# Patient Record
Sex: MALE | Race: ASIAN | NOT HISPANIC OR LATINO
[De-identification: names, ages, dates, MRNs, and addresses within clinical notes are randomized per-mention and may not be internally consistent; named-entity substitution may affect disease eponyms.]

---

## 2017-02-23 ENCOUNTER — APPOINTMENT (OUTPATIENT)
Dept: OPHTHALMOLOGY | Facility: CLINIC | Age: 72
End: 2017-02-23

## 2017-02-23 DIAGNOSIS — H26.9 UNSPECIFIED CATARACT: ICD-10-CM

## 2017-03-24 ENCOUNTER — APPOINTMENT (OUTPATIENT)
Dept: OPHTHALMOLOGY | Facility: CLINIC | Age: 72
End: 2017-03-24

## 2017-07-14 ENCOUNTER — APPOINTMENT (OUTPATIENT)
Dept: OPHTHALMOLOGY | Facility: CLINIC | Age: 72
End: 2017-07-14

## 2017-12-15 ENCOUNTER — APPOINTMENT (OUTPATIENT)
Dept: OPHTHALMOLOGY | Facility: CLINIC | Age: 72
End: 2017-12-15
Payer: MEDICARE

## 2017-12-15 PROCEDURE — 92012 INTRM OPH EXAM EST PATIENT: CPT

## 2017-12-15 PROCEDURE — 92083 EXTENDED VISUAL FIELD XM: CPT

## 2018-02-09 ENCOUNTER — APPOINTMENT (OUTPATIENT)
Dept: OPHTHALMOLOGY | Facility: CLINIC | Age: 73
End: 2018-02-09
Payer: MEDICARE

## 2018-02-09 PROCEDURE — 92012 INTRM OPH EXAM EST PATIENT: CPT | Mod: 25

## 2018-02-09 PROCEDURE — 68761 CLOSE TEAR DUCT OPENING: CPT | Mod: E2,E4

## 2018-02-14 ENCOUNTER — APPOINTMENT (OUTPATIENT)
Dept: THORACIC SURGERY | Facility: CLINIC | Age: 73
End: 2018-02-14
Payer: MEDICARE

## 2018-02-14 VITALS
BODY MASS INDEX: 23.66 KG/M2 | HEIGHT: 65 IN | SYSTOLIC BLOOD PRESSURE: 123 MMHG | DIASTOLIC BLOOD PRESSURE: 80 MMHG | WEIGHT: 142 LBS | RESPIRATION RATE: 17 BRPM | OXYGEN SATURATION: 97 % | HEART RATE: 88 BPM

## 2018-02-14 DIAGNOSIS — Z82.49 FAMILY HISTORY OF ISCHEMIC HEART DISEASE AND OTHER DISEASES OF THE CIRCULATORY SYSTEM: ICD-10-CM

## 2018-02-14 DIAGNOSIS — Z86.69 PERSONAL HISTORY OF OTHER DISEASES OF THE NERVOUS SYSTEM AND SENSE ORGANS: ICD-10-CM

## 2018-02-14 PROCEDURE — 99205 OFFICE O/P NEW HI 60 MIN: CPT

## 2018-02-14 RX ORDER — ALENDRONATE SODIUM 70 MG/1
70 TABLET ORAL
Refills: 0 | Status: ACTIVE | COMMUNITY

## 2018-02-14 RX ORDER — ATORVASTATIN CALCIUM 10 MG/1
10 TABLET, FILM COATED ORAL
Refills: 0 | Status: ACTIVE | COMMUNITY

## 2018-02-23 ENCOUNTER — OUTPATIENT (OUTPATIENT)
Dept: OUTPATIENT SERVICES | Facility: HOSPITAL | Age: 73
LOS: 1 days | End: 2018-02-23
Payer: MEDICARE

## 2018-02-23 VITALS
SYSTOLIC BLOOD PRESSURE: 130 MMHG | HEART RATE: 92 BPM | WEIGHT: 141.98 LBS | DIASTOLIC BLOOD PRESSURE: 80 MMHG | HEIGHT: 65 IN | TEMPERATURE: 98 F | OXYGEN SATURATION: 96 % | RESPIRATION RATE: 17 BRPM

## 2018-02-23 DIAGNOSIS — G47.33 OBSTRUCTIVE SLEEP APNEA (ADULT) (PEDIATRIC): ICD-10-CM

## 2018-02-23 DIAGNOSIS — Z98.49 CATARACT EXTRACTION STATUS, UNSPECIFIED EYE: Chronic | ICD-10-CM

## 2018-02-23 DIAGNOSIS — Z98.890 OTHER SPECIFIED POSTPROCEDURAL STATES: Chronic | ICD-10-CM

## 2018-02-23 DIAGNOSIS — J45.909 UNSPECIFIED ASTHMA, UNCOMPLICATED: ICD-10-CM

## 2018-02-23 DIAGNOSIS — R91.8 OTHER NONSPECIFIC ABNORMAL FINDING OF LUNG FIELD: ICD-10-CM

## 2018-02-23 DIAGNOSIS — Z91.013 ALLERGY TO SEAFOOD: ICD-10-CM

## 2018-02-23 LAB
ALBUMIN SERPL ELPH-MCNC: 4.3 G/DL — SIGNIFICANT CHANGE UP (ref 3.3–5)
ALP SERPL-CCNC: 50 U/L — SIGNIFICANT CHANGE UP (ref 40–120)
ALT FLD-CCNC: 48 U/L — HIGH (ref 4–41)
AST SERPL-CCNC: 32 U/L — SIGNIFICANT CHANGE UP (ref 4–40)
BILIRUB SERPL-MCNC: 0.7 MG/DL — SIGNIFICANT CHANGE UP (ref 0.2–1.2)
BLD GP AB SCN SERPL QL: NEGATIVE — SIGNIFICANT CHANGE UP
BUN SERPL-MCNC: 12 MG/DL — SIGNIFICANT CHANGE UP (ref 7–23)
CALCIUM SERPL-MCNC: 8.6 MG/DL — SIGNIFICANT CHANGE UP (ref 8.4–10.5)
CHLORIDE SERPL-SCNC: 106 MMOL/L — SIGNIFICANT CHANGE UP (ref 98–107)
CO2 SERPL-SCNC: 27 MMOL/L — SIGNIFICANT CHANGE UP (ref 22–31)
CREAT SERPL-MCNC: 0.83 MG/DL — SIGNIFICANT CHANGE UP (ref 0.5–1.3)
GLUCOSE SERPL-MCNC: 102 MG/DL — HIGH (ref 70–99)
HCT VFR BLD CALC: 44.5 % — SIGNIFICANT CHANGE UP (ref 39–50)
HGB BLD-MCNC: 14.3 G/DL — SIGNIFICANT CHANGE UP (ref 13–17)
MCHC RBC-ENTMCNC: 30 PG — SIGNIFICANT CHANGE UP (ref 27–34)
MCHC RBC-ENTMCNC: 32.1 % — SIGNIFICANT CHANGE UP (ref 32–36)
MCV RBC AUTO: 93.5 FL — SIGNIFICANT CHANGE UP (ref 80–100)
NRBC # FLD: 0 — SIGNIFICANT CHANGE UP
PLATELET # BLD AUTO: 200 K/UL — SIGNIFICANT CHANGE UP (ref 150–400)
PMV BLD: 11.1 FL — SIGNIFICANT CHANGE UP (ref 7–13)
POTASSIUM SERPL-MCNC: 3.9 MMOL/L — SIGNIFICANT CHANGE UP (ref 3.5–5.3)
POTASSIUM SERPL-SCNC: 3.9 MMOL/L — SIGNIFICANT CHANGE UP (ref 3.5–5.3)
PROT SERPL-MCNC: 7.9 G/DL — SIGNIFICANT CHANGE UP (ref 6–8.3)
RBC # BLD: 4.76 M/UL — SIGNIFICANT CHANGE UP (ref 4.2–5.8)
RBC # FLD: 13.4 % — SIGNIFICANT CHANGE UP (ref 10.3–14.5)
RH IG SCN BLD-IMP: POSITIVE — SIGNIFICANT CHANGE UP
SODIUM SERPL-SCNC: 144 MMOL/L — SIGNIFICANT CHANGE UP (ref 135–145)
WBC # BLD: 5.31 K/UL — SIGNIFICANT CHANGE UP (ref 3.8–10.5)
WBC # FLD AUTO: 5.31 K/UL — SIGNIFICANT CHANGE UP (ref 3.8–10.5)

## 2018-02-23 PROCEDURE — 93010 ELECTROCARDIOGRAM REPORT: CPT

## 2018-02-23 RX ORDER — SODIUM CHLORIDE 9 MG/ML
1000 INJECTION, SOLUTION INTRAVENOUS
Qty: 0 | Refills: 0 | Status: DISCONTINUED | OUTPATIENT
Start: 2018-02-28 | End: 2018-03-04

## 2018-02-23 NOTE — H&P PST ADULT - RS GEN PE MLT RESP DETAILS PC
good air movement/no rales/no chest wall tenderness/no wheezes/airway patent/no rhonchi/respirations non-labored/clear to auscultation bilaterally/breath sounds equal

## 2018-02-23 NOTE — H&P PST ADULT - HISTORY OF PRESENT ILLNESS
72 year old male presents to presurgical testing with diagnosis of other specific abnormal finding of lung field scheduled for navigational bronchoscopy biopsy right video assisted thoracoscopy, robotic assisted right lower lobectomy for 2/28/18. Pt reports fever and cough in 2/2018 treated with antibiotics, with Abnormal CXR. Pt followed up with CT chest which revealed a right lower lung mass. Pt denies SOB with exertion. Reports cough has resolved.

## 2018-02-23 NOTE — H&P PST ADULT - NEGATIVE OPHTHALMOLOGIC SYMPTOMS
no blurred vision L/no blurred vision R/no pain L/no loss of vision R/no pain R/no loss of vision L/no diplopia/no photophobia

## 2018-02-23 NOTE — H&P PST ADULT - PMH
Asthma  mild  BPH (Benign Prostatic Hyperplasia)    Elevated triglycerides with high cholesterol    Fatty liver    Foot fracture, left  " 90's  Impaired glucose tolerance  on diet control  DALIA on CPAP    Other nonspecific abnormal finding of lung field    Renal calculi  ' 2005:  passed  TB (tuberculosis)  age 20:  Treated with meds X 1year

## 2018-02-23 NOTE — H&P PST ADULT - PSH
Foot fracture, left  ' 90's:  ORIF  H/O left inguinal hernia repair    History of prostate surgery  greenlight laser 2013  Status post cataract extraction  bilateral

## 2018-02-23 NOTE — H&P PST ADULT - NEGATIVE NEUROLOGICAL SYMPTOMS
no difficulty walking/no tremors/no syncope/no vertigo/no headache/no transient paralysis/no weakness/no generalized seizures/no focal seizures/no paresthesias

## 2018-02-23 NOTE — H&P PST ADULT - NEGATIVE ENMT SYMPTOMS
no dysphagia/no vertigo/no nose bleeds/no tinnitus/no ear pain/no hearing difficulty/no throat pain/no sinus symptoms

## 2018-02-23 NOTE — H&P PST ADULT - PROBLEM SELECTOR PLAN 1
Pt is scheduled for navigational bronchoscopy biopsy right video assisted thoracoscopy, robotic assisted right lower lobectomy for 2/28/18. Preop instructions, pepcid, surgical scrub provided. Pt stated understanding. Pending medical evaluation with cardiac note with recent echo, stress, and halter monitor results.

## 2018-02-27 ENCOUNTER — APPOINTMENT (OUTPATIENT)
Dept: THORACIC SURGERY | Facility: CLINIC | Age: 73
End: 2018-02-27
Payer: MEDICARE

## 2018-02-27 VITALS
WEIGHT: 146 LBS | HEART RATE: 89 BPM | DIASTOLIC BLOOD PRESSURE: 82 MMHG | RESPIRATION RATE: 17 BRPM | SYSTOLIC BLOOD PRESSURE: 133 MMHG | BODY MASS INDEX: 24.3 KG/M2 | OXYGEN SATURATION: 95 % | TEMPERATURE: 97.4 F

## 2018-02-27 PROCEDURE — 99214 OFFICE O/P EST MOD 30 MIN: CPT | Mod: 24

## 2018-02-28 ENCOUNTER — RESULT REVIEW (OUTPATIENT)
Age: 73
End: 2018-02-28

## 2018-02-28 ENCOUNTER — APPOINTMENT (OUTPATIENT)
Dept: THORACIC SURGERY | Facility: HOSPITAL | Age: 73
End: 2018-02-28

## 2018-02-28 ENCOUNTER — TRANSCRIPTION ENCOUNTER (OUTPATIENT)
Age: 73
End: 2018-02-28

## 2018-02-28 ENCOUNTER — INPATIENT (INPATIENT)
Facility: HOSPITAL | Age: 73
LOS: 11 days | Discharge: HOME | End: 2018-03-12
Attending: THORACIC SURGERY (CARDIOTHORACIC VASCULAR SURGERY) | Admitting: THORACIC SURGERY (CARDIOTHORACIC VASCULAR SURGERY)
Payer: MEDICARE

## 2018-02-28 VITALS
DIASTOLIC BLOOD PRESSURE: 86 MMHG | HEART RATE: 74 BPM | RESPIRATION RATE: 16 BRPM | OXYGEN SATURATION: 97 % | WEIGHT: 141.98 LBS | HEIGHT: 65 IN | SYSTOLIC BLOOD PRESSURE: 135 MMHG | TEMPERATURE: 98 F

## 2018-02-28 DIAGNOSIS — R91.8 OTHER NONSPECIFIC ABNORMAL FINDING OF LUNG FIELD: ICD-10-CM

## 2018-02-28 DIAGNOSIS — Z98.890 OTHER SPECIFIED POSTPROCEDURAL STATES: Chronic | ICD-10-CM

## 2018-02-28 DIAGNOSIS — Z98.49 CATARACT EXTRACTION STATUS, UNSPECIFIED EYE: Chronic | ICD-10-CM

## 2018-02-28 LAB
BASE EXCESS BLDA CALC-SCNC: -0.9 MMOL/L — SIGNIFICANT CHANGE UP
BASE EXCESS BLDA CALC-SCNC: -1.7 MMOL/L — SIGNIFICANT CHANGE UP
BUN SERPL-MCNC: 13 MG/DL — SIGNIFICANT CHANGE UP (ref 7–23)
CA-I BLDA-SCNC: 1.12 MMOL/L — LOW (ref 1.15–1.29)
CALCIUM SERPL-MCNC: 7.9 MG/DL — LOW (ref 8.4–10.5)
CHLORIDE SERPL-SCNC: 102 MMOL/L — SIGNIFICANT CHANGE UP (ref 98–107)
CO2 SERPL-SCNC: 20 MMOL/L — LOW (ref 22–31)
CREAT SERPL-MCNC: 0.75 MG/DL — SIGNIFICANT CHANGE UP (ref 0.5–1.3)
GLUCOSE BLDA-MCNC: 177 MG/DL — HIGH (ref 70–99)
GLUCOSE SERPL-MCNC: 172 MG/DL — HIGH (ref 70–99)
GRAM STN SPT: SIGNIFICANT CHANGE UP
HCO3 BLDA-SCNC: 23 MMOL/L — SIGNIFICANT CHANGE UP (ref 22–26)
HCO3 BLDA-SCNC: 23 MMOL/L — SIGNIFICANT CHANGE UP (ref 22–26)
HCT VFR BLDA CALC: 43.8 % — SIGNIFICANT CHANGE UP (ref 39–51)
HGB BLDA-MCNC: 14.3 G/DL — SIGNIFICANT CHANGE UP (ref 13–17)
MAGNESIUM SERPL-MCNC: 2 MG/DL — SIGNIFICANT CHANGE UP (ref 1.6–2.6)
PCO2 BLDA: 43 MMHG — SIGNIFICANT CHANGE UP (ref 35–48)
PCO2 BLDA: 51 MMHG — HIGH (ref 35–48)
PH BLDA: 7.3 PH — LOW (ref 7.35–7.45)
PH BLDA: 7.35 PH — SIGNIFICANT CHANGE UP (ref 7.35–7.45)
PHOSPHATE SERPL-MCNC: 2.4 MG/DL — LOW (ref 2.5–4.5)
PO2 BLDA: 128 MMHG — HIGH (ref 83–108)
PO2 BLDA: 61 MMHG — LOW (ref 83–108)
POTASSIUM BLDA-SCNC: 4 MMOL/L — SIGNIFICANT CHANGE UP (ref 3.4–4.5)
POTASSIUM SERPL-MCNC: 3.8 MMOL/L — SIGNIFICANT CHANGE UP (ref 3.5–5.3)
POTASSIUM SERPL-SCNC: 3.8 MMOL/L — SIGNIFICANT CHANGE UP (ref 3.5–5.3)
RH IG SCN BLD-IMP: POSITIVE — SIGNIFICANT CHANGE UP
SAO2 % BLDA: 88.3 % — LOW (ref 95–99)
SAO2 % BLDA: 98.8 % — SIGNIFICANT CHANGE UP (ref 95–99)
SODIUM BLDA-SCNC: 142 MMOL/L — SIGNIFICANT CHANGE UP (ref 136–146)
SODIUM SERPL-SCNC: 139 MMOL/L — SIGNIFICANT CHANGE UP (ref 135–145)
SPECIMEN SOURCE: SIGNIFICANT CHANGE UP

## 2018-02-28 PROCEDURE — 31624 DX BRONCHOSCOPE/LAVAGE: CPT

## 2018-02-28 PROCEDURE — 88331 PATH CONSLTJ SURG 1 BLK 1SPC: CPT | Mod: 26

## 2018-02-28 PROCEDURE — 71045 X-RAY EXAM CHEST 1 VIEW: CPT | Mod: 26

## 2018-02-28 PROCEDURE — 31645 BRNCHSC W/THER ASPIR 1ST: CPT | Mod: 76

## 2018-02-28 PROCEDURE — 31625 BRONCHOSCOPY W/BIOPSY(S): CPT | Mod: 59

## 2018-02-28 PROCEDURE — 88305 TISSUE EXAM BY PATHOLOGIST: CPT | Mod: 26

## 2018-02-28 PROCEDURE — 31623 DX BRONCHOSCOPE/BRUSH: CPT

## 2018-02-28 PROCEDURE — 32674 THORACOSCOPY LYMPH NODE EXC: CPT

## 2018-02-28 PROCEDURE — 88305 TISSUE EXAM BY PATHOLOGIST: CPT | Mod: 26,59

## 2018-02-28 PROCEDURE — 32663 THORACOSCOPY W/LOBECTOMY: CPT

## 2018-02-28 PROCEDURE — 88307 TISSUE EXAM BY PATHOLOGIST: CPT | Mod: 26

## 2018-02-28 PROCEDURE — 32652 THORACOSCOPY REM TOTL CORTEX: CPT

## 2018-02-28 PROCEDURE — 31627 NAVIGATIONAL BRONCHOSCOPY: CPT

## 2018-02-28 PROCEDURE — 32668 THORACOSCOPY W/W RESECT DIAG: CPT

## 2018-02-28 PROCEDURE — 88313 SPECIAL STAINS GROUP 2: CPT | Mod: 26

## 2018-02-28 PROCEDURE — 88173 CYTOPATH EVAL FNA REPORT: CPT | Mod: 26

## 2018-02-28 PROCEDURE — 88309 TISSUE EXAM BY PATHOLOGIST: CPT | Mod: 26

## 2018-02-28 PROCEDURE — 88112 CYTOPATH CELL ENHANCE TECH: CPT | Mod: 26,59

## 2018-02-28 PROCEDURE — 99222 1ST HOSP IP/OBS MODERATE 55: CPT

## 2018-02-28 PROCEDURE — 31629 BRONCHOSCOPY/NEEDLE BX EACH: CPT

## 2018-02-28 PROCEDURE — S2900 ROBOTIC SURGICAL SYSTEM: CPT | Mod: NC

## 2018-02-28 RX ORDER — SODIUM CHLORIDE 9 MG/ML
250 INJECTION, SOLUTION INTRAVENOUS ONCE
Qty: 0 | Refills: 0 | Status: COMPLETED | OUTPATIENT
Start: 2018-02-28 | End: 2018-02-28

## 2018-02-28 RX ORDER — FAMOTIDINE 10 MG/ML
20 INJECTION INTRAVENOUS
Qty: 0 | Refills: 0 | Status: DISCONTINUED | OUTPATIENT
Start: 2018-02-28 | End: 2018-03-03

## 2018-02-28 RX ORDER — HEPARIN SODIUM 5000 [USP'U]/ML
5000 INJECTION INTRAVENOUS; SUBCUTANEOUS EVERY 8 HOURS
Qty: 0 | Refills: 0 | Status: DISCONTINUED | OUTPATIENT
Start: 2018-02-28 | End: 2018-03-12

## 2018-02-28 RX ORDER — FENTANYL CITRATE 50 UG/ML
25 INJECTION INTRAVENOUS ONCE
Qty: 0 | Refills: 0 | Status: DISCONTINUED | OUTPATIENT
Start: 2018-02-28 | End: 2018-02-28

## 2018-02-28 RX ORDER — HYDROMORPHONE HYDROCHLORIDE 2 MG/ML
30 INJECTION INTRAMUSCULAR; INTRAVENOUS; SUBCUTANEOUS
Qty: 0 | Refills: 0 | Status: DISCONTINUED | OUTPATIENT
Start: 2018-02-28 | End: 2018-03-04

## 2018-02-28 RX ORDER — ONDANSETRON 8 MG/1
4 TABLET, FILM COATED ORAL EVERY 6 HOURS
Qty: 0 | Refills: 0 | Status: DISCONTINUED | OUTPATIENT
Start: 2018-02-28 | End: 2018-03-04

## 2018-02-28 RX ORDER — FLUTICASONE PROPIONATE 220 MCG
1 AEROSOL WITH ADAPTER (GRAM) INHALATION
Qty: 0 | Refills: 0 | Status: DISCONTINUED | OUTPATIENT
Start: 2018-02-28 | End: 2018-03-02

## 2018-02-28 RX ORDER — FENTANYL CITRATE 50 UG/ML
25 INJECTION INTRAVENOUS
Qty: 0 | Refills: 0 | Status: DISCONTINUED | OUTPATIENT
Start: 2018-02-28 | End: 2018-03-01

## 2018-02-28 RX ORDER — ACETAMINOPHEN 500 MG
1000 TABLET ORAL ONCE
Qty: 0 | Refills: 0 | Status: COMPLETED | OUTPATIENT
Start: 2018-02-28 | End: 2018-03-01

## 2018-02-28 RX ORDER — DOCUSATE SODIUM 100 MG
100 CAPSULE ORAL THREE TIMES A DAY
Qty: 0 | Refills: 0 | Status: DISCONTINUED | OUTPATIENT
Start: 2018-02-28 | End: 2018-03-12

## 2018-02-28 RX ORDER — IPRATROPIUM/ALBUTEROL SULFATE 18-103MCG
3 AEROSOL WITH ADAPTER (GRAM) INHALATION EVERY 6 HOURS
Qty: 0 | Refills: 0 | Status: DISCONTINUED | OUTPATIENT
Start: 2018-02-28 | End: 2018-03-01

## 2018-02-28 RX ORDER — HEPARIN SODIUM 5000 [USP'U]/ML
5000 INJECTION INTRAVENOUS; SUBCUTANEOUS ONCE
Qty: 0 | Refills: 0 | Status: COMPLETED | OUTPATIENT
Start: 2018-02-28 | End: 2018-02-28

## 2018-02-28 RX ORDER — ATORVASTATIN CALCIUM 80 MG/1
10 TABLET, FILM COATED ORAL AT BEDTIME
Qty: 0 | Refills: 0 | Status: DISCONTINUED | OUTPATIENT
Start: 2018-02-28 | End: 2018-03-12

## 2018-02-28 RX ORDER — HYDROMORPHONE HYDROCHLORIDE 2 MG/ML
0.5 INJECTION INTRAMUSCULAR; INTRAVENOUS; SUBCUTANEOUS
Qty: 0 | Refills: 0 | Status: DISCONTINUED | OUTPATIENT
Start: 2018-02-28 | End: 2018-03-04

## 2018-02-28 RX ORDER — NALOXONE HYDROCHLORIDE 4 MG/.1ML
0.1 SPRAY NASAL
Qty: 0 | Refills: 0 | Status: DISCONTINUED | OUTPATIENT
Start: 2018-02-28 | End: 2018-03-04

## 2018-02-28 RX ADMIN — HEPARIN SODIUM 5000 UNIT(S): 5000 INJECTION INTRAVENOUS; SUBCUTANEOUS at 11:34

## 2018-02-28 RX ADMIN — FENTANYL CITRATE 25 MICROGRAM(S): 50 INJECTION INTRAVENOUS at 22:55

## 2018-02-28 RX ADMIN — FENTANYL CITRATE 25 MICROGRAM(S): 50 INJECTION INTRAVENOUS at 23:10

## 2018-02-28 RX ADMIN — SODIUM CHLORIDE 30 MILLILITER(S): 9 INJECTION, SOLUTION INTRAVENOUS at 11:35

## 2018-02-28 RX ADMIN — SODIUM CHLORIDE 500 MILLILITER(S): 9 INJECTION, SOLUTION INTRAVENOUS at 12:43

## 2018-02-28 NOTE — ASU PREOP CHECKLIST - INTERNAL PROSTHESES
Right hernia mesh and Left foot screw/yes(specify) left hernia mesh and right foot screw/yes(specify)

## 2018-02-28 NOTE — BRIEF OPERATIVE NOTE - PROCEDURE
<<-----Click on this checkbox to enter Procedure Lobectomy, lung, robot assisted  02/28/2018  Robot assisted RLL wedge resection and completion lobectomy  Active  [id number]  Flexible bronchoscopy  02/28/2018    Active  [id number]  Navigational bronchoscopy  02/28/2018    Active  [id number]

## 2018-02-28 NOTE — BRIEF OPERATIVE NOTE - COMMENTS
Post extubation somnolent and groaning, using muscles of accessory respiration. Emergent bronchoscopy performed by Dr. Maier with some blood and secretions.  Patient more alert afterwards.

## 2018-02-28 NOTE — BRIEF OPERATIVE NOTE - SPECIMENS
L sided bronchial alveolar lavage, R sided navigational bronchoscopy biospies, RLL wedge, Completion right lobectomy

## 2018-02-28 NOTE — BRIEF OPERATIVE NOTE - OPERATION/FINDINGS
Navigational bronchoscopy biopsies with inflammatory bronchial cells on cytopathology  RLL lung nodule wedged out consistent with adenocarcinoma requiring completion lobectomy

## 2018-02-28 NOTE — PROGRESS NOTE ADULT - SUBJECTIVE AND OBJECTIVE BOX
72 M PMH mild Asthma  mild, BPH, triglyceridemia, fatty liver, DALIA on CPAP, renal calculi, TB w SPN  The patient had a fever & cough w an abnormal CXR in Feb 2018, treated w ABXs. Follow up CT scan revealed a right lower lung mass. Currently, the pt has no SOB and his cough has resolved.     POD # 0 ([id number]): Navigational bronchoscopy, VATS, RLLectomy, MLND, FOB    Issues:   Post op pain  	DALIA  	HLD  	BPH  	Asthma  	BPH    ROS:  As per medical records  General 	no fever; no chills; no sweating; no anorexia; no weight loss; no weight gain	  Skin 	no rash; no itching	  Breast 	no breast tenderness L; no breast tenderness R; no breast lump L; no breast lump R 	  Ophthalmologic	no diplopia; no photophobia; no blurred vision L; no blurred vision R; no pain L; no pain R; no loss of vision L; no loss of vision R  ENMT	no hearing difficulty; no ear pain; no tinnitus; no vertigo; no sinus symptoms; no nose bleeds; no throat pain; no dysphagia  	  Respiratory and Thorax	no wheezing; no dyspnea; no cough, asthma - mild, denies recent exacerbations, never intubated, Dx pulmonary nodule  Cardiovascular				no chest pain; no palpitations; no peripheral edema  Gastrointestinal	no nausea; no vomiting; no diarrhea; no constipation; no abdominal pain; no melena  Genitourinary	no nausea; no vomiting; no diarrhea; no constipation; no abdominal pain; no melena, increased urinary frequency; nocturia; BPH  Musculoskeletal	no arthralgia; no arthritis; no stiffness; no neck pain; no back pain  Neurological	no transient paralysis; no weakness; no paresthesias; no generalized seizures; no focal seizures; no syncope; no tremors; no vertigo; no difficulty walking; no headache  Psychiatric	no suicidal ideation; no depression; no anxiety  Hematology	no gum bleeding; no nose bleeding  Lymphatic	no enlarged lymph nodes; no tender lymph nodes  Endocrine	no cold intolerance; no heat intolerance, impaired glucose tolerance on diet control. Denies thyroid dysfunction  Immunological	no recurring infections; no persistent infections      Past Medical History:  Asthma  mild  BPH (Benign Prostatic Hyperplasia)    Elevated triglycerides with high cholesterol    Fatty liver    Foot fracture, left  " 90's  Impaired glucose tolerance  on diet control  DALIA on CPAP    Other nonspecific abnormal finding of lung field    Renal calculi  ' 2005:  passed  TB (tuberculosis)  age 20:  Treated with meds X 1year.     Past Surgical History:  Foot fracture, left  ' 90's:  ORIF  H/O left inguinal hernia repair    History of prostate surgery  greenlight laser 2013  Status post cataract extraction  bilateral.     Family History:  No pertinent family history in first degree relatives.     Social History:  Marital Status		  Occupation	retired	  Lives With	spouse	     Substance Use History:  Substance Use	never used	     Alcohol Use History:  Have you ever consumed alcohol	never	     Tobacco Usage:  Tobacco Usage: Never smoker	     Passive Smoke Exposure:  Passive Smoke Exposure	No	         Allergies:  	strawberry: Food, Confirmed, Patient, Other, Cough  	Cherries: Cough: Food, Confirmed, Patient, Other, Cherries: Cough  	shellfish: Food, Confirmed, Patient, Other, cough  	dust: Miscellaneous, Confirmed, Patient, Sneezing, Environmental Allergy    Home Medications:   * Incomplete Medication History as of 23-Feb-2018 13:42 documented in Structured Notes  · 	atorvastatin 10 mg oral tablet: Last Dose Taken:  , 1 tab(s) orally once a day, pm  · 	multivitamin: Last Dose Taken:  , 1 tab(s) orally once a day, last dose 2/23/18  · 	Vitamin D 3: Last Dose Taken:  , 1000 unit(s) orally once a day, last dose 2/23/18  · 	loratadine 10 mg oral tablet: Last Dose Taken:  , 1 tab(s) orally once a day, pm  · 	Omega 3 acid: Last Dose Taken:  , 1  orally 2 times a day, last dose 2/23/18  · 	Advair Diskus 250 mcg-50 mcg inhalation powder: Last Dose Taken:  , 1 puff(s) inhaled 2 times a day  · 	fluticasone 50 mcg inhalation powder: Last Dose Taken:  , 1 puff(s) inhaled 2 times a day       MEDICATIONS  (STANDING):  atorvastatin 10 milliGRAM(s) Oral at bedtime  docusate sodium 100 milliGRAM(s) Oral three times a day  famotidine    Tablet 20 milliGRAM(s) Oral two times a day  fluticasone propionate   44 MICROgram(s) HFA Inhaler 1 Puff(s) Inhalation two times a day  heparin  Injectable 5000 Unit(s) SubCutaneous every 8 hours  lactated ringers. 1000 milliLiter(s) (30 mL/Hr) IV Continuous <Continuous>    MEDICATIONS  (PRN):  acetaminophen  IVPB. 1000 milliGRAM(s) IV Intermittent once PRN Moderate Pain (4 - 6)  ALBUTerol/ipratropium for Nebulization 3 milliLiter(s) Nebulizer every 6 hours PRN Shortness of Breath  fentaNYL    Injectable 25 MICROGram(s) IV Push every 2 hours PRN Moderate Pain (4 - 6)      ICU Vital Signs Last 24 Hrs  T(C): 36.4 (28 Feb 2018 21:35), Max: 36.8 (28 Feb 2018 11:02)  T(F): 97.6 (28 Feb 2018 21:35), Max: 98.2 (28 Feb 2018 11:02)  HR: 87 (28 Feb 2018 21:45) (74 - 98)  BP: 123/74 (28 Feb 2018 21:40) (123/74 - 135/86)  BP(mean): 84 (28 Feb 2018 21:40) (84 - 94)  ABP: 120/87 (28 Feb 2018 21:45) (120/87 - 123/81)  ABP(mean): 103 (28 Feb 2018 21:45) (100 - 103)  RR: 11 (28 Feb 2018 21:45) (11 - 16)  SpO2: 98% (28 Feb 2018 21:45) (95% - 98%)      Physical exam:                           General			WDWN in NAD	  HEENT			NC, AT, PERRL; EOMI; conjunctiva clear, No oral lesions; gross abnormalities  Neck			Supple  Resp			Good B/L air ebntery, decreased at bases R>L, + rhonchi/ crackles  CVS:  			regular rate and rhythm  no murmur  EXT			no clubbing; no cyanosis; no pedal edema  Neuro:			Sedated, arousable, No focal deficits  MSK			ROM intact; no joint swelling/ erythema/ warmth; no calf tenderness; nl strength			    Labs:                                                            [id number]  WBC  		5.31  HH		14.3/44.5  PLT		200K    Na		144  K		3.9  Chloride	106  Bicarb		27  BUN		12  Creatinine	0.83  GFR		88  Total Protein	7.9  Albumin	4.3  Total Bilirubin	0.7  Alk Phos	50  AST		32  ALT		48    POCT Blood Glucose.: 95 mg/dL (28 Feb 2018 11:14)    ABG - ( 28 Feb 2018 20:57 )  pH: 7.30  /  pCO2: 51    /  pO2: 61    / HCO3: 23    / Base Excess: -0.9  /  SaO2: 88.3       CXR  [id number]  Volume loss on R  SQ emphysema  No PTX  NAD  CT in good place      Plan:  HPI:  72 year old male presents to presurgical testing with diagnosis of other specific abnormal finding of lung field scheduled for navigational bronchoscopy biopsy right video assisted thoracoscopy, robotic assisted right lower lobectomy for 2/28/18. Pt reports fever and cough in 2/2018 treated with antibiotics, with Abnormal CXR. Pt followed up with CT chest which revealed a right lower lung mass. Pt denies SOB with exertion. Reports cough has resolved. (23 Feb 2018 13:36)                            Neuro:                                         Pain control with PCA / Tylenol IV                             Cardiovascular:                                          Continue hemodynamic monitoring.                                         Not on any pressors                                         Continue cardiovascular / antihypertensive medications      Resume BBs asap                            Respiratory:                                         Pt is on nasal canula                                         Comfortable, not in any distress.                                         Use incentive spirometry asap     Monitor chest tube output                                         Chest tube to suction                                          Continue bronchodilators, pulmonary toilet                            GI                                         On clears liquid diet                                         Continue GI prophylaxis with Protonix                                         Continue Zofran / Reglan for nausea - PRN	                                  Renal:                                         Continue IVF                                         Monitor I/Os and electrolytes                                         Keep Bell                                                 Hematologic / Oncology:                                         SQH & SCDs for VTE prophylaxis                                         Monitor chest tube output. No signs of active bleeding.                                          Follow CBC in AM                           Infectious disease:                                          No signs of infection. Monitor for fever / leukocytosis.                                          All surgical incision / chest tube  sites look clean                                          D/C Bell                            Endocrine:                                           Continue Finger stick glucose checks with coverage    All available pertinent clinical, laboratory, radiographic, hemodynamic, echocardiographic, respiratory data, microbiologic data and chart were reviewed and analyzed frequently throughout the course of the day and night.  Patient seen, examined and plan discussed with CT Surgeon/ CTICU team during rounds.              I have spent 80 minutes of critical care time with this patient between 0830 pm and 1159 pm.    Alfredo Mathews MD

## 2018-03-01 LAB
CULTURE - ACID FAST SMEAR CONCENTRATED: SIGNIFICANT CHANGE UP
HCT VFR BLD CALC: 43.8 % — SIGNIFICANT CHANGE UP (ref 39–50)
HGB BLD-MCNC: 14.3 G/DL — SIGNIFICANT CHANGE UP (ref 13–17)
MCHC RBC-ENTMCNC: 30.2 PG — SIGNIFICANT CHANGE UP (ref 27–34)
MCHC RBC-ENTMCNC: 32.6 % — SIGNIFICANT CHANGE UP (ref 32–36)
MCV RBC AUTO: 92.4 FL — SIGNIFICANT CHANGE UP (ref 80–100)
NRBC # FLD: 0 — SIGNIFICANT CHANGE UP
PLATELET # BLD AUTO: 154 K/UL — SIGNIFICANT CHANGE UP (ref 150–400)
PMV BLD: 10.8 FL — SIGNIFICANT CHANGE UP (ref 7–13)
RBC # BLD: 4.74 M/UL — SIGNIFICANT CHANGE UP (ref 4.2–5.8)
RBC # FLD: 13.3 % — SIGNIFICANT CHANGE UP (ref 10.3–14.5)
SPECIMEN SOURCE: SIGNIFICANT CHANGE UP
WBC # BLD: 13.34 K/UL — HIGH (ref 3.8–10.5)
WBC # FLD AUTO: 13.34 K/UL — HIGH (ref 3.8–10.5)

## 2018-03-01 PROCEDURE — 99233 SBSQ HOSP IP/OBS HIGH 50: CPT

## 2018-03-01 PROCEDURE — 71045 X-RAY EXAM CHEST 1 VIEW: CPT | Mod: 26

## 2018-03-01 RX ORDER — TAMSULOSIN HYDROCHLORIDE 0.4 MG/1
0.4 CAPSULE ORAL AT BEDTIME
Qty: 0 | Refills: 0 | Status: DISCONTINUED | OUTPATIENT
Start: 2018-03-01 | End: 2018-03-12

## 2018-03-01 RX ORDER — IPRATROPIUM/ALBUTEROL SULFATE 18-103MCG
3 AEROSOL WITH ADAPTER (GRAM) INHALATION EVERY 6 HOURS
Qty: 0 | Refills: 0 | Status: DISCONTINUED | OUTPATIENT
Start: 2018-03-01 | End: 2018-03-02

## 2018-03-01 RX ORDER — DORNASE ALFA 1 MG/ML
2.5 SOLUTION RESPIRATORY (INHALATION) DAILY
Qty: 0 | Refills: 0 | Status: COMPLETED | OUTPATIENT
Start: 2018-03-01 | End: 2018-03-04

## 2018-03-01 RX ORDER — ACETAMINOPHEN 500 MG
1000 TABLET ORAL ONCE
Qty: 0 | Refills: 0 | Status: COMPLETED | OUTPATIENT
Start: 2018-03-01 | End: 2018-03-01

## 2018-03-01 RX ORDER — BENZOCAINE AND MENTHOL 5; 1 G/100ML; G/100ML
1 LIQUID ORAL EVERY 4 HOURS
Qty: 0 | Refills: 0 | Status: DISCONTINUED | OUTPATIENT
Start: 2018-03-01 | End: 2018-03-12

## 2018-03-01 RX ORDER — POTASSIUM PHOSPHATE, MONOBASIC POTASSIUM PHOSPHATE, DIBASIC 236; 224 MG/ML; MG/ML
15 INJECTION, SOLUTION INTRAVENOUS ONCE
Qty: 0 | Refills: 0 | Status: COMPLETED | OUTPATIENT
Start: 2018-03-01 | End: 2018-03-01

## 2018-03-01 RX ORDER — ACETAMINOPHEN 500 MG
1000 TABLET ORAL ONCE
Qty: 0 | Refills: 0 | Status: COMPLETED | OUTPATIENT
Start: 2018-03-02 | End: 2018-03-02

## 2018-03-01 RX ORDER — SODIUM CHLORIDE 9 MG/ML
4 INJECTION INTRAMUSCULAR; INTRAVENOUS; SUBCUTANEOUS EVERY 6 HOURS
Qty: 0 | Refills: 0 | Status: DISCONTINUED | OUTPATIENT
Start: 2018-03-01 | End: 2018-03-02

## 2018-03-01 RX ORDER — IPRATROPIUM BROMIDE 0.2 MG/ML
500 SOLUTION, NON-ORAL INHALATION EVERY 6 HOURS
Qty: 0 | Refills: 0 | Status: DISCONTINUED | OUTPATIENT
Start: 2018-03-01 | End: 2018-03-01

## 2018-03-01 RX ADMIN — POTASSIUM PHOSPHATE, MONOBASIC POTASSIUM PHOSPHATE, DIBASIC 62.5 MILLIMOLE(S): 236; 224 INJECTION, SOLUTION INTRAVENOUS at 06:31

## 2018-03-01 RX ADMIN — Medication 400 MILLIGRAM(S): at 03:23

## 2018-03-01 RX ADMIN — TAMSULOSIN HYDROCHLORIDE 0.4 MILLIGRAM(S): 0.4 CAPSULE ORAL at 21:27

## 2018-03-01 RX ADMIN — Medication 400 MILLIGRAM(S): at 21:27

## 2018-03-01 RX ADMIN — FAMOTIDINE 20 MILLIGRAM(S): 10 INJECTION INTRAVENOUS at 17:44

## 2018-03-01 RX ADMIN — Medication 100 MILLIGRAM(S): at 06:34

## 2018-03-01 RX ADMIN — SODIUM CHLORIDE 4 MILLILITER(S): 9 INJECTION INTRAMUSCULAR; INTRAVENOUS; SUBCUTANEOUS at 22:48

## 2018-03-01 RX ADMIN — Medication 3 MILLILITER(S): at 16:05

## 2018-03-01 RX ADMIN — ATORVASTATIN CALCIUM 10 MILLIGRAM(S): 80 TABLET, FILM COATED ORAL at 21:28

## 2018-03-01 RX ADMIN — Medication 1 PUFF(S): at 22:49

## 2018-03-01 RX ADMIN — Medication 1000 MILLIGRAM(S): at 03:35

## 2018-03-01 RX ADMIN — HEPARIN SODIUM 5000 UNIT(S): 5000 INJECTION INTRAVENOUS; SUBCUTANEOUS at 14:52

## 2018-03-01 RX ADMIN — HYDROMORPHONE HYDROCHLORIDE 0.5 MILLIGRAM(S): 2 INJECTION INTRAMUSCULAR; INTRAVENOUS; SUBCUTANEOUS at 19:37

## 2018-03-01 RX ADMIN — Medication 1000 MILLIGRAM(S): at 11:30

## 2018-03-01 RX ADMIN — Medication 100 MILLIGRAM(S): at 14:52

## 2018-03-01 RX ADMIN — Medication 3 MILLILITER(S): at 22:45

## 2018-03-01 RX ADMIN — SODIUM CHLORIDE 30 MILLILITER(S): 9 INJECTION, SOLUTION INTRAVENOUS at 07:30

## 2018-03-01 RX ADMIN — HYDROMORPHONE HYDROCHLORIDE 0.5 MILLIGRAM(S): 2 INJECTION INTRAMUSCULAR; INTRAVENOUS; SUBCUTANEOUS at 00:55

## 2018-03-01 RX ADMIN — SODIUM CHLORIDE 10 MILLILITER(S): 9 INJECTION, SOLUTION INTRAVENOUS at 19:16

## 2018-03-01 RX ADMIN — FAMOTIDINE 20 MILLIGRAM(S): 10 INJECTION INTRAVENOUS at 06:33

## 2018-03-01 RX ADMIN — HYDROMORPHONE HYDROCHLORIDE 30 MILLILITER(S): 2 INJECTION INTRAMUSCULAR; INTRAVENOUS; SUBCUTANEOUS at 00:51

## 2018-03-01 RX ADMIN — Medication 100 MILLIGRAM(S): at 21:27

## 2018-03-01 RX ADMIN — HEPARIN SODIUM 5000 UNIT(S): 5000 INJECTION INTRAVENOUS; SUBCUTANEOUS at 00:08

## 2018-03-01 RX ADMIN — HEPARIN SODIUM 5000 UNIT(S): 5000 INJECTION INTRAVENOUS; SUBCUTANEOUS at 21:27

## 2018-03-01 RX ADMIN — Medication 400 MILLIGRAM(S): at 11:00

## 2018-03-01 RX ADMIN — Medication 1000 MILLIGRAM(S): at 21:57

## 2018-03-01 RX ADMIN — DORNASE ALFA 2.5 MILLIGRAM(S): 1 SOLUTION RESPIRATORY (INHALATION) at 22:48

## 2018-03-01 RX ADMIN — HYDROMORPHONE HYDROCHLORIDE 30 MILLILITER(S): 2 INJECTION INTRAMUSCULAR; INTRAVENOUS; SUBCUTANEOUS at 07:29

## 2018-03-01 RX ADMIN — Medication 3 MILLILITER(S): at 13:46

## 2018-03-01 RX ADMIN — HYDROMORPHONE HYDROCHLORIDE 30 MILLILITER(S): 2 INJECTION INTRAMUSCULAR; INTRAVENOUS; SUBCUTANEOUS at 19:15

## 2018-03-01 RX ADMIN — SODIUM CHLORIDE 30 MILLILITER(S): 9 INJECTION, SOLUTION INTRAVENOUS at 03:34

## 2018-03-01 RX ADMIN — Medication 1 PUFF(S): at 10:24

## 2018-03-01 NOTE — PROGRESS NOTE ADULT - SUBJECTIVE AND OBJECTIVE BOX
Day _2_ of Anesthesia Pain Management Service    Allergies  Cherries: Cough (Other)  dust (Sneezing)  No Known Drug Allergies  shellfish (Other)  strawberry (Other)      SUBJECTIVE: "I don't have a lot of pain because I press the button."    Pain Scale Score	At rest: _3/10_ 	With Activity: ___ 	[ ] Refer to charted pain scores    THERAPY:    [ ] IV PCA Morphine		[ ] 5 mg/mL	[ ] 1 mg/mL  [X] IV PCA Hydromorphone	[ ] 5 mg/mL	[X] 1 mg/mL  [ ] IV PCA Fentanyl		[ ] 50 micrograms/mL    Demand dose _0.2mg_ lockout _6_ (minutes) Continuous Rate _0_ Total: _3.7mg_  Daily      MEDICATIONS  (STANDING):  atorvastatin 10 milliGRAM(s) Oral at bedtime  docusate sodium 100 milliGRAM(s) Oral three times a day  famotidine    Tablet 20 milliGRAM(s) Oral two times a day  fluticasone propionate   44 MICROgram(s) HFA Inhaler 1 Puff(s) Inhalation two times a day  heparin  Injectable 5000 Unit(s) SubCutaneous every 8 hours  HYDROmorphone PCA (1 mG/mL) 30 milliLiter(s) PCA Continuous PCA Continuous  lactated ringers. 1000 milliLiter(s) (30 mL/Hr) IV Continuous <Continuous>    MEDICATIONS  (PRN):  ALBUTerol/ipratropium for Nebulization 3 milliLiter(s) Nebulizer every 6 hours PRN Shortness of Breath  HYDROmorphone PCA (1 mG/mL) Rescue Clinician Bolus 0.5 milliGRAM(s) IV Push every 15 minutes PRN for Pain Scale GREATER THAN 6  naloxone Injectable 0.1 milliGRAM(s) IV Push every 3 minutes PRN For ANY of the following changes in patient status:  A. RR LESS THAN 10 breaths per minute, B. Oxygen saturation LESS THAN 90%, C. Sedation score of 6  ondansetron Injectable 4 milliGRAM(s) IV Push every 6 hours PRN Nausea      OBJECTIVE: A&Ox3, NAD, sitting up in chair    Sedation Score:	[X] Alert	[ ] Drowsy	[ ] Arousable	[ ] Asleep	[ ] Unresponsive    Side Effects:	[X] None	[ ] Nausea	[ ] Vomiting	[ ] Pruritus  		  [ ] Weakness		[ ] Numbness	[ ] Other:                              14.3   13.34 )-----------( 154      ( 28 Feb 2018 23:50 )             43.8       02-28    139  |  102  |  13  ----------------------------<  172<H>  3.8   |  20<L>  |  0.75    Ca    7.9<L>      28 Feb 2018 22:24  Phos  2.4     02-28  Mg     2.0     02-28        ASSESSMENT/ PLAN    Therapy to  be:	[X] Continue   [ ] Discontinued   [ ] Change to prn Analgesics    Documentation and Verification of current medications:  [X] Done	[ ] Not done, not eligible  [ ] Not done, reason not given    Comments:  +chest tube

## 2018-03-01 NOTE — PROGRESS NOTE ADULT - SUBJECTIVE AND OBJECTIVE BOX
72 M PMH mild Asthma  mild, BPH, triglyceridemia, fatty liver, DALIA on CPAP, renal calculi, TB w SPN  The patient had a fever & cough w an abnormal CXR in Feb 2018, treated w ABXs. Follow up CT scan revealed a right lower lung mass. Currently, the pt has no SOB and his cough has resolved.     POD # 1 ([id number]): Navigational bronchoscopy, VATS, RLLectomy, MLND, FOB    Issues:   Post op pain  	DALIA  	HLD  	BPH  	Asthma  	BPH    Allergies:  	strawberry: Food, Confirmed, Patient, Other, Cough  	Cherries: Cough: Food, Confirmed, Patient, Other, Cherries: Cough  	shellfish: Food, Confirmed, Patient, Other, cough  	dust: Miscellaneous, Confirmed, Patient, Sneezing, Environmental Allergy    Home Medications:   * Incomplete Medication History as of 23-Feb-2018 13:42 documented in Structured Notes  · 	atorvastatin 10 mg oral tablet: Last Dose Taken:  , 1 tab(s) orally once a day, pm  · 	multivitamin: Last Dose Taken:  , 1 tab(s) orally once a day, last dose 2/23/18  · 	Vitamin D 3: Last Dose Taken:  , 1000 unit(s) orally once a day, last dose 2/23/18  · 	loratadine 10 mg oral tablet: Last Dose Taken:  , 1 tab(s) orally once a day, pm  · 	Omega 3 acid: Last Dose Taken:  , 1  orally 2 times a day, last dose 2/23/18  · 	Advair Diskus 250 mcg-50 mcg inhalation powder: Last Dose Taken:  , 1 puff(s) inhaled 2 times a day  · 	fluticasone 50 mcg inhalation powder: Last Dose Taken:  , 1 puff(s) inhaled 2 times a day       MEDICATIONS  (STANDING):  atorvastatin 10 milliGRAM(s) Oral at bedtime  docusate sodium 100 milliGRAM(s) Oral three times a day  famotidine    Tablet 20 milliGRAM(s) Oral two times a day  fluticasone propionate   44 MICROgram(s) HFA Inhaler 1 Puff(s) Inhalation two times a day  heparin  Injectable 5000 Unit(s) SubCutaneous every 8 hours  HYDROmorphone PCA (1 mG/mL) 30 milliLiter(s) PCA Continuous PCA Continuous  lactated ringers. 1000 milliLiter(s) (30 mL/Hr) IV Continuous <Continuous>  potassium phosphate IVPB 15 milliMole(s) IV Intermittent once    MEDICATIONS  (PRN):  ALBUTerol/ipratropium for Nebulization 3 milliLiter(s) Nebulizer every 6 hours PRN Shortness of Breath  fentaNYL    Injectable 25 MICROGram(s) IV Push every 2 hours PRN Moderate Pain (4 - 6)  HYDROmorphone PCA (1 mG/mL) Rescue Clinician Bolus 0.5 milliGRAM(s) IV Push every 15 minutes PRN for Pain Scale GREATER THAN 6  naloxone Injectable 0.1 milliGRAM(s) IV Push every 3 minutes PRN For ANY of the following changes in patient status:  A. RR LESS THAN 10 breaths per minute, B. Oxygen saturation LESS THAN 90%, C. Sedation score of 6  ondansetron Injectable 4 milliGRAM(s) IV Push every 6 hours PRN Nausea    Past Medical History:  Asthma  mild  BPH (Benign Prostatic Hyperplasia)    Elevated triglycerides with high cholesterol    Fatty liver    Foot fracture, left  " 90's  Impaired glucose tolerance  on diet control  DALIA on CPAP    Other nonspecific abnormal finding of lung field    Renal calculi  ' 2005:  passed  TB (tuberculosis)  age 20:  Treated with meds X 1year.        ICU Vital Signs Last 24 Hrs  T(C): 36.4 (01 Mar 2018 00:00), Max: 36.8 (28 Feb 2018 11:02)  T(F): 97.5 (01 Mar 2018 00:00), Max: 98.2 (28 Feb 2018 11:02)  HR: 101 (01 Mar 2018 03:59) (74 - 106)  BP: 138/91 (01 Mar 2018 03:00) (114/79 - 138/91)  BP(mean): 102 (01 Mar 2018 03:00) (84 - 102)  ABP: 92/78 (28 Feb 2018 23:30) (92/78 - 127/74)  ABP(mean): 85 (28 Feb 2018 23:30) (85 - 103)  RR: 14 (01 Mar 2018 03:00) (10 - 22)  SpO2: 95% (01 Mar 2018 03:59) (94% - 100%)    Physical exam:                           General			WDWN in NAD	  HEENT			NC, AT, PERRL; EOMI; conjunctiva clear, No oral lesions; gross abnormalities  Neck			Supple  Resp			Good B/L air ebntery, decreased at bases R>L, + rhonchi/ crackles  CVS:  			regular rate and rhythm  no murmur  EXT			no clubbing; no cyanosis; no pedal edema  Neuro:			Sedated, arousable, No focal deficits  MSK			ROM intact; no joint swelling/ erythema/ warmth; no calf tenderness; nl strength			      I&O's Summary    28 Feb 2018 07:01  -  01 Mar 2018 06:20  --------------------------------------------------------  IN: 370 mL / OUT: 845 mL / NET: -475 mL      Labs:                                                                           14.3   13.34 )-----------( 154      ( 28 Feb 2018 23:50 )             43.8                            02-28    139  |  102  |  13  ----------------------------<  172<H>  3.8   |  20<L>  |  0.75    Ca    7.9<L>      28 Feb 2018 22:24  Phos  2.4     02-28  Mg     2.0     02-28        POCT Blood Glucose.: 95 mg/dL (28 Feb 2018 11:14)        ABG - ( 28 Feb 2018 23:00 )  pH: 7.35  /  pCO2: 43    /  pO2: 128   / HCO3: 23    / Base Excess: -1.7  /  SaO2: 98.8        CXR  [id number]  Volume loss R  Deviated trachea to the R  NAD       Plan:  72 M PMH mild Asthma  mild, BPH, triglyceridemia, fatty liver, DALIA on CPAP, renal calculi, TB w SPN  The patient had a fever & cough w an abnormal CXR in Feb 2018, treated w ABXs. Follow up CT scan revealed a right lower lung mass. Currently, the pt has no SOB and his cough has resolved.   POD # 1 ([id number]): Navigational bronchoscopy, VATS, RLLectomy, MLND, FOB  Issues:   Post op pain  	DALIA  	HLD  	BPH  	Asthma  	BPH                            Neuro:                                         Pain control with PCA / Tylenol IV                             Cardiovascular:                                          Continue hemodynamic monitoring.                                         Not on any pressors                                         Continue cardiovascular / antihypertensive medications      Resume BBs asap                            Respiratory:                                         Pt is on nasal canula                                         Comfortable, not in any distress.                                         Use incentive spirometry asap     Monitor chest tube output                                         Chest tube to suction                                          Continue bronchodilators, pulmonary toilet                            GI                                         On regular  diet                                         Continue GI prophylaxis with Protonix                                         Continue Zofran / Reglan for nausea - PRN	                                  Renal:                                         Continue IVF                                         Monitor I/Os and electrolytes                                         Keep Bell                                                 Hematologic / Oncology:                                         SQH & SCDs for VTE prophylaxis                                         Monitor chest tube output. No signs of active bleeding.                                          Follow CBC in AM                           Infectious disease:                                          No signs of infection. Monitor for fever / leukocytosis.                                          All surgical incision / chest tube  sites look clean                                          D/C Bell                            Endocrine:                                           Continue Finger stick glucose checks with coverage    All available pertinent clinical, laboratory, radiographic, hemodynamic, echocardiographic, respiratory data, microbiologic data and chart were reviewed and analyzed frequently  Patient seen, examined and plan discussed with CT Surgeon/ CTICU team during rounds.                Alfredo Mathews MD

## 2018-03-01 NOTE — PROGRESS NOTE ADULT - SUBJECTIVE AND OBJECTIVE BOX
Anesthesia Pain Management Service    SUBJECTIVE: Pt doing well with IV PCA without problems reported.    Therapy:	  [ X] IV PCA	   [ ] Epidural           [ ] s/p Spinal Opoid              [ ] Postpartum infusion	  [ ] Patient controlled regional anesthesia (PCRA)    [ ] prn Analgesics    Allergies    Cherries: Cough (Other)  dust (Sneezing)  No Known Drug Allergies  shellfish (Other)  strawberry (Other)    Intolerances      MEDICATIONS  (STANDING):  ALBUTerol/ipratropium for Nebulization 3 milliLiter(s) Nebulizer every 6 hours  atorvastatin 10 milliGRAM(s) Oral at bedtime  docusate sodium 100 milliGRAM(s) Oral three times a day  famotidine    Tablet 20 milliGRAM(s) Oral two times a day  fluticasone propionate   44 MICROgram(s) HFA Inhaler 1 Puff(s) Inhalation two times a day  heparin  Injectable 5000 Unit(s) SubCutaneous every 8 hours  HYDROmorphone PCA (1 mG/mL) 30 milliLiter(s) PCA Continuous PCA Continuous  lactated ringers. 1000 milliLiter(s) (10 mL/Hr) IV Continuous <Continuous>  tamsulosin 0.4 milliGRAM(s) Oral at bedtime    MEDICATIONS  (PRN):  HYDROmorphone PCA (1 mG/mL) Rescue Clinician Bolus 0.5 milliGRAM(s) IV Push every 15 minutes PRN for Pain Scale GREATER THAN 6  naloxone Injectable 0.1 milliGRAM(s) IV Push every 3 minutes PRN For ANY of the following changes in patient status:  A. RR LESS THAN 10 breaths per minute, B. Oxygen saturation LESS THAN 90%, C. Sedation score of 6  ondansetron Injectable 4 milliGRAM(s) IV Push every 6 hours PRN Nausea      OBJECTIVE:   [X] No new signs     [ ] Other:    Side Effects:  [X ] None			[ ] Other:    Assessment of Catheter Site:		[ ] Intact		[ ] Other:    ASSESSMENT/PLAN  [ X] Continue current therapy    [ ] Therapy changed to:    [ ] IV PCA       [ ] Epidural     [ ] prn Analgesics     Comments:

## 2018-03-01 NOTE — PROGRESS NOTE ADULT - SUBJECTIVE AND OBJECTIVE BOX
POST ANESTHESIA EVALUATION    72y Male POSTOP DAY 1 S/P     MENTAL STATUS: Patient participation x[x  ] Awake     [  ] Arousable     [  ] Sedated    AIRWAY PATENCY: [ x ] Satisfactory  [  ] Other:     Vital Signs Last 24 Hrs  T(C): 37.2 (01 Mar 2018 12:00), Max: 37.2 (01 Mar 2018 12:00)  T(F): 98.9 (01 Mar 2018 12:00), Max: 98.9 (01 Mar 2018 12:00)  HR: 100 (01 Mar 2018 14:00) (86 - 107)  BP: 112/70 (01 Mar 2018 14:00) (107/67 - 138/91)  BP(mean): 80 (01 Mar 2018 14:00) (58 - 102)  RR: 21 (01 Mar 2018 14:00) (9 - 22)  SpO2: 99% (01 Mar 2018 14:00) (94% - 100%)  I&O's Summary    28 Feb 2018 07:01  -  01 Mar 2018 07:00  --------------------------------------------------------  IN: 680 mL / OUT: 1085 mL / NET: -405 mL    01 Mar 2018 07:01  -  01 Mar 2018 14:51  --------------------------------------------------------  IN: 340 mL / OUT: 240 mL / NET: 100 mL          NAUSEA/ VOMITTING:  [ x ] NONE  [  ] CONTROLLED [  ] OTHER     PAIN: [x  ] CONTROLLED WITH CURRENT REGIMEN  [  ] OTHER    [x  ] NO APPARENT ANESTHESIA COMPLICATIONS      Comments:

## 2018-03-02 LAB
BUN SERPL-MCNC: 18 MG/DL — SIGNIFICANT CHANGE UP (ref 7–23)
CALCIUM SERPL-MCNC: 8.1 MG/DL — LOW (ref 8.4–10.5)
CHLORIDE SERPL-SCNC: 99 MMOL/L — SIGNIFICANT CHANGE UP (ref 98–107)
CO2 SERPL-SCNC: 23 MMOL/L — SIGNIFICANT CHANGE UP (ref 22–31)
CREAT SERPL-MCNC: 0.97 MG/DL — SIGNIFICANT CHANGE UP (ref 0.5–1.3)
GLUCOSE SERPL-MCNC: 152 MG/DL — HIGH (ref 70–99)
HBA1C BLD-MCNC: 6.1 % — HIGH (ref 4–5.6)
HCT VFR BLD CALC: 42.8 % — SIGNIFICANT CHANGE UP (ref 39–50)
HGB BLD-MCNC: 14.3 G/DL — SIGNIFICANT CHANGE UP (ref 13–17)
MAGNESIUM SERPL-MCNC: 2.4 MG/DL — SIGNIFICANT CHANGE UP (ref 1.6–2.6)
MCHC RBC-ENTMCNC: 30.1 PG — SIGNIFICANT CHANGE UP (ref 27–34)
MCHC RBC-ENTMCNC: 33.4 % — SIGNIFICANT CHANGE UP (ref 32–36)
MCV RBC AUTO: 90.1 FL — SIGNIFICANT CHANGE UP (ref 80–100)
NRBC # FLD: 0.02 — SIGNIFICANT CHANGE UP
PHOSPHATE SERPL-MCNC: 2.5 MG/DL — SIGNIFICANT CHANGE UP (ref 2.5–4.5)
PLATELET # BLD AUTO: 142 K/UL — LOW (ref 150–400)
PMV BLD: 10.7 FL — SIGNIFICANT CHANGE UP (ref 7–13)
POTASSIUM SERPL-MCNC: 4 MMOL/L — SIGNIFICANT CHANGE UP (ref 3.5–5.3)
POTASSIUM SERPL-SCNC: 4 MMOL/L — SIGNIFICANT CHANGE UP (ref 3.5–5.3)
RBC # BLD: 4.75 M/UL — SIGNIFICANT CHANGE UP (ref 4.2–5.8)
RBC # FLD: 13.5 % — SIGNIFICANT CHANGE UP (ref 10.3–14.5)
SODIUM SERPL-SCNC: 138 MMOL/L — SIGNIFICANT CHANGE UP (ref 135–145)
SPECIMEN SOURCE: SIGNIFICANT CHANGE UP
WBC # BLD: 6.12 K/UL — SIGNIFICANT CHANGE UP (ref 3.8–10.5)
WBC # FLD AUTO: 6.12 K/UL — SIGNIFICANT CHANGE UP (ref 3.8–10.5)

## 2018-03-02 PROCEDURE — 99291 CRITICAL CARE FIRST HOUR: CPT

## 2018-03-02 PROCEDURE — 71045 X-RAY EXAM CHEST 1 VIEW: CPT | Mod: 26,59

## 2018-03-02 RX ORDER — DEXTROSE 50 % IN WATER 50 %
12.5 SYRINGE (ML) INTRAVENOUS ONCE
Qty: 0 | Refills: 0 | Status: DISCONTINUED | OUTPATIENT
Start: 2018-03-02 | End: 2018-03-12

## 2018-03-02 RX ORDER — ALBUTEROL 90 UG/1
2 AEROSOL, METERED ORAL EVERY 4 HOURS
Qty: 0 | Refills: 0 | Status: DISCONTINUED | OUTPATIENT
Start: 2018-03-02 | End: 2018-03-12

## 2018-03-02 RX ORDER — FUROSEMIDE 40 MG
20 TABLET ORAL ONCE
Qty: 0 | Refills: 0 | Status: COMPLETED | OUTPATIENT
Start: 2018-03-02 | End: 2018-03-02

## 2018-03-02 RX ORDER — ACETYLCYSTEINE 200 MG/ML
2 VIAL (ML) MISCELLANEOUS EVERY 6 HOURS
Qty: 0 | Refills: 0 | Status: DISCONTINUED | OUTPATIENT
Start: 2018-03-02 | End: 2018-03-02

## 2018-03-02 RX ORDER — DEXTROSE 50 % IN WATER 50 %
25 SYRINGE (ML) INTRAVENOUS ONCE
Qty: 0 | Refills: 0 | Status: DISCONTINUED | OUTPATIENT
Start: 2018-03-02 | End: 2018-03-12

## 2018-03-02 RX ORDER — BUDESONIDE AND FORMOTEROL FUMARATE DIHYDRATE 160; 4.5 UG/1; UG/1
2 AEROSOL RESPIRATORY (INHALATION)
Qty: 0 | Refills: 0 | Status: DISCONTINUED | OUTPATIENT
Start: 2018-03-02 | End: 2018-03-12

## 2018-03-02 RX ORDER — FUROSEMIDE 40 MG
10 TABLET ORAL ONCE
Qty: 0 | Refills: 0 | Status: COMPLETED | OUTPATIENT
Start: 2018-03-02 | End: 2018-03-02

## 2018-03-02 RX ORDER — IPRATROPIUM BROMIDE 0.2 MG/ML
500 SOLUTION, NON-ORAL INHALATION EVERY 6 HOURS
Qty: 0 | Refills: 0 | Status: DISCONTINUED | OUTPATIENT
Start: 2018-03-02 | End: 2018-03-06

## 2018-03-02 RX ORDER — DEXTROSE 50 % IN WATER 50 %
1 SYRINGE (ML) INTRAVENOUS ONCE
Qty: 0 | Refills: 0 | Status: DISCONTINUED | OUTPATIENT
Start: 2018-03-02 | End: 2018-03-12

## 2018-03-02 RX ORDER — ACETYLCYSTEINE 200 MG/ML
2 VIAL (ML) MISCELLANEOUS EVERY 6 HOURS
Qty: 0 | Refills: 0 | Status: COMPLETED | OUTPATIENT
Start: 2018-03-02 | End: 2018-03-03

## 2018-03-02 RX ORDER — ACETAMINOPHEN 500 MG
1000 TABLET ORAL ONCE
Qty: 0 | Refills: 0 | Status: COMPLETED | OUTPATIENT
Start: 2018-03-02 | End: 2018-03-02

## 2018-03-02 RX ORDER — ACETYLCYSTEINE 200 MG/ML
4 VIAL (ML) MISCELLANEOUS EVERY 6 HOURS
Qty: 0 | Refills: 0 | Status: DISCONTINUED | OUTPATIENT
Start: 2018-03-02 | End: 2018-03-02

## 2018-03-02 RX ORDER — LORATADINE 10 MG/1
10 TABLET ORAL DAILY
Qty: 0 | Refills: 0 | Status: DISCONTINUED | OUTPATIENT
Start: 2018-03-02 | End: 2018-03-12

## 2018-03-02 RX ORDER — SODIUM CHLORIDE 9 MG/ML
4 INJECTION INTRAMUSCULAR; INTRAVENOUS; SUBCUTANEOUS
Qty: 0 | Refills: 0 | Status: COMPLETED | OUTPATIENT
Start: 2018-03-02 | End: 2018-03-04

## 2018-03-02 RX ORDER — SODIUM CHLORIDE 9 MG/ML
1000 INJECTION, SOLUTION INTRAVENOUS
Qty: 0 | Refills: 0 | Status: DISCONTINUED | OUTPATIENT
Start: 2018-03-02 | End: 2018-03-12

## 2018-03-02 RX ORDER — GLUCAGON INJECTION, SOLUTION 0.5 MG/.1ML
1 INJECTION, SOLUTION SUBCUTANEOUS ONCE
Qty: 0 | Refills: 0 | Status: DISCONTINUED | OUTPATIENT
Start: 2018-03-02 | End: 2018-03-12

## 2018-03-02 RX ORDER — ACETYLCYSTEINE 200 MG/ML
5 VIAL (ML) MISCELLANEOUS EVERY 6 HOURS
Qty: 0 | Refills: 0 | Status: DISCONTINUED | OUTPATIENT
Start: 2018-03-02 | End: 2018-03-02

## 2018-03-02 RX ORDER — INSULIN LISPRO 100/ML
VIAL (ML) SUBCUTANEOUS
Qty: 0 | Refills: 0 | Status: DISCONTINUED | OUTPATIENT
Start: 2018-03-02 | End: 2018-03-08

## 2018-03-02 RX ADMIN — BUDESONIDE AND FORMOTEROL FUMARATE DIHYDRATE 2 PUFF(S): 160; 4.5 AEROSOL RESPIRATORY (INHALATION) at 15:41

## 2018-03-02 RX ADMIN — Medication 400 MILLIGRAM(S): at 15:45

## 2018-03-02 RX ADMIN — Medication 4 MILLILITER(S): at 15:42

## 2018-03-02 RX ADMIN — HEPARIN SODIUM 5000 UNIT(S): 5000 INJECTION INTRAVENOUS; SUBCUTANEOUS at 06:19

## 2018-03-02 RX ADMIN — Medication 3 MILLILITER(S): at 09:49

## 2018-03-02 RX ADMIN — ATORVASTATIN CALCIUM 10 MILLIGRAM(S): 80 TABLET, FILM COATED ORAL at 22:53

## 2018-03-02 RX ADMIN — HEPARIN SODIUM 5000 UNIT(S): 5000 INJECTION INTRAVENOUS; SUBCUTANEOUS at 22:53

## 2018-03-02 RX ADMIN — Medication 1: at 23:08

## 2018-03-02 RX ADMIN — Medication 80 MILLIGRAM(S): at 22:53

## 2018-03-02 RX ADMIN — Medication 100 MILLIGRAM(S): at 06:19

## 2018-03-02 RX ADMIN — Medication 3 MILLILITER(S): at 04:09

## 2018-03-02 RX ADMIN — BUDESONIDE AND FORMOTEROL FUMARATE DIHYDRATE 2 PUFF(S): 160; 4.5 AEROSOL RESPIRATORY (INHALATION) at 23:16

## 2018-03-02 RX ADMIN — BENZOCAINE AND MENTHOL 1 LOZENGE: 5; 1 LIQUID ORAL at 16:05

## 2018-03-02 RX ADMIN — SODIUM CHLORIDE 4 MILLILITER(S): 9 INJECTION INTRAMUSCULAR; INTRAVENOUS; SUBCUTANEOUS at 09:51

## 2018-03-02 RX ADMIN — Medication 10 MILLIGRAM(S): at 03:55

## 2018-03-02 RX ADMIN — Medication 1000 MILLIGRAM(S): at 16:00

## 2018-03-02 RX ADMIN — Medication 100 MILLIGRAM(S): at 22:53

## 2018-03-02 RX ADMIN — HEPARIN SODIUM 5000 UNIT(S): 5000 INJECTION INTRAVENOUS; SUBCUTANEOUS at 14:17

## 2018-03-02 RX ADMIN — HYDROMORPHONE HYDROCHLORIDE 30 MILLILITER(S): 2 INJECTION INTRAMUSCULAR; INTRAVENOUS; SUBCUTANEOUS at 19:28

## 2018-03-02 RX ADMIN — Medication 20 MILLIGRAM(S): at 23:08

## 2018-03-02 RX ADMIN — LORATADINE 10 MILLIGRAM(S): 10 TABLET ORAL at 14:16

## 2018-03-02 RX ADMIN — Medication 1000 MILLIGRAM(S): at 07:25

## 2018-03-02 RX ADMIN — HYDROMORPHONE HYDROCHLORIDE 30 MILLILITER(S): 2 INJECTION INTRAMUSCULAR; INTRAVENOUS; SUBCUTANEOUS at 07:30

## 2018-03-02 RX ADMIN — SODIUM CHLORIDE 10 MILLILITER(S): 9 INJECTION, SOLUTION INTRAVENOUS at 07:30

## 2018-03-02 RX ADMIN — Medication 100 MILLIGRAM(S): at 14:16

## 2018-03-02 RX ADMIN — Medication 80 MILLIGRAM(S): at 14:16

## 2018-03-02 RX ADMIN — FAMOTIDINE 20 MILLIGRAM(S): 10 INJECTION INTRAVENOUS at 17:26

## 2018-03-02 RX ADMIN — Medication 500 MICROGRAM(S): at 15:42

## 2018-03-02 RX ADMIN — FAMOTIDINE 20 MILLIGRAM(S): 10 INJECTION INTRAVENOUS at 06:19

## 2018-03-02 RX ADMIN — DORNASE ALFA 2.5 MILLIGRAM(S): 1 SOLUTION RESPIRATORY (INHALATION) at 09:51

## 2018-03-02 RX ADMIN — TAMSULOSIN HYDROCHLORIDE 0.4 MILLIGRAM(S): 0.4 CAPSULE ORAL at 22:53

## 2018-03-02 RX ADMIN — Medication 2 MILLILITER(S): at 23:16

## 2018-03-02 RX ADMIN — Medication 1 PUFF(S): at 09:52

## 2018-03-02 RX ADMIN — Medication 400 MILLIGRAM(S): at 07:10

## 2018-03-02 RX ADMIN — Medication 500 MICROGRAM(S): at 22:15

## 2018-03-02 RX ADMIN — HYDROMORPHONE HYDROCHLORIDE 0.5 MILLIGRAM(S): 2 INJECTION INTRAMUSCULAR; INTRAVENOUS; SUBCUTANEOUS at 03:22

## 2018-03-02 RX ADMIN — SODIUM CHLORIDE 4 MILLILITER(S): 9 INJECTION INTRAMUSCULAR; INTRAVENOUS; SUBCUTANEOUS at 22:37

## 2018-03-02 RX ADMIN — SODIUM CHLORIDE 4 MILLILITER(S): 9 INJECTION INTRAMUSCULAR; INTRAVENOUS; SUBCUTANEOUS at 04:17

## 2018-03-02 NOTE — PROGRESS NOTE ADULT - SUBJECTIVE AND OBJECTIVE BOX
MORENITA BARRIENTOS            [id number]         Cherries: Cough (Other)  dust (Sneezing)  No Known Drug Allergies  shellfish (Other)  strawberry (Other)                 HPI:  72 year old male presents to presurgical testing with diagnosis of other specific abnormal finding of lung field scheduled for navigational bronchoscopy biopsy right video assisted thoracoscopy, robotic assisted right lower lobectomy for 2/28/18. Pt reports fever and cough in 2/2018 treated with antibiotics, with Abnormal CXR. Pt followed up with CT chest which revealed a right lower lung mass. Pt denies SOB with exertion. Reports cough has resolved. (23 Feb 2018 13:36)      Procedure: Robot assisted RLL wedge resection and completion lobectomy   POD# 2    Issues:  Shortness of breath  Asthma  Lung nodule  Postop pain  Urinary retention  /  BPH  HLD               Home Medications:  Advair Diskus 250 mcg-50 mcg inhalation powder: 1 puff(s) inhaled 2 times a day (28 Feb 2018 11:30)  atorvastatin 10 mg oral tablet: 1 tab(s) orally once a day, pm (28 Feb 2018 11:30)  fluticasone 50 mcg inhalation powder: 1 puff(s) inhaled 2 times a day (28 Feb 2018 11:30)  loratadine 10 mg oral tablet: 1 tab(s) orally once a day, pm (28 Feb 2018 11:30)  multivitamin: 1 tab(s) orally once a day, last dose 2/23/18 (28 Feb 2018 11:30)  Omega 3 acid: 1  orally 2 times a day, last dose 2/23/18 (28 Feb 2018 11:30)  Vitamin D 3: 1000 unit(s) orally once a day, last dose 2/23/18 (28 Feb 2018 11:30)      PAST MEDICAL & SURGICAL HISTORY:  Impaired glucose tolerance: on diet control  Other nonspecific abnormal finding of lung field  DALIA on CPAP  TB (tuberculosis): age 20:  Treated with meds X 1year  Fatty liver  Renal calculi: &#x27; 2005:  passed  BPH (Benign Prostatic Hyperplasia)  Asthma: mild  Elevated triglycerides with high cholesterol  Foot fracture, left: &quot; 90&#x27;s  History of prostate surgery: greenlight laser 2013  H/O left inguinal hernia repair  Status post cataract extraction: bilateral  Foot fracture, left: &#x27; 90&#x27;s:  ORIF        ICU Vital Signs Last 24 Hrs  T(C): 36.6 (02 Mar 2018 04:00), Max: 37.2 (01 Mar 2018 12:00)  T(F): 97.8 (02 Mar 2018 04:00), Max: 98.9 (01 Mar 2018 12:00)  HR: 101 (02 Mar 2018 04:09) (88 - 121)  BP: 102/63 (02 Mar 2018 04:00) (99/56 - 130/85)  BP(mean): 72 (02 Mar 2018 04:00) (58 - 95)  ABP: --  ABP(mean): --  RR: 28 (02 Mar 2018 04:00) (9 - 32)  SpO2: 94% (02 Mar 2018 04:09) (93% - 99%)    I&O's Detail    28 Feb 2018 07:01  -  01 Mar 2018 07:00  --------------------------------------------------------  IN:    IV PiggyBack: 350 mL    lactated ringers.: 330 mL  Total IN: 680 mL    OUT:    Chest Tube: 210 mL    Indwelling Catheter - Urethral: 875 mL  Total OUT: 1085 mL    Total NET: -405 mL      01 Mar 2018 07:01  -  02 Mar 2018 04:40  --------------------------------------------------------  IN:    IV PiggyBack: 200 mL    lactated ringers.: 360 mL    Oral Fluid: 540 mL  Total IN: 1100 mL    OUT:    Chest Tube: 405 mL    Indwelling Catheter - Urethral: 480 mL    Voided: 1000 mL  Total OUT: 1885 mL    Total NET: -785 mL        CAPILLARY BLOOD GLUCOSE      POCT Blood Glucose.: 95 mg/dL (28 Feb 2018 11:14)      Home Medications:  Advair Diskus 250 mcg-50 mcg inhalation powder: 1 puff(s) inhaled 2 times a day (28 Feb 2018 11:30)  atorvastatin 10 mg oral tablet: 1 tab(s) orally once a day, pm (28 Feb 2018 11:30)  fluticasone 50 mcg inhalation powder: 1 puff(s) inhaled 2 times a day (28 Feb 2018 11:30)  loratadine 10 mg oral tablet: 1 tab(s) orally once a day, pm (28 Feb 2018 11:30)  multivitamin: 1 tab(s) orally once a day, last dose 2/23/18 (28 Feb 2018 11:30)  Omega 3 acid: 1  orally 2 times a day, last dose 2/23/18 (28 Feb 2018 11:30)  Vitamin D 3: 1000 unit(s) orally once a day, last dose 2/23/18 (28 Feb 2018 11:30)      MEDICATIONS  (STANDING):  ALBUTerol/ipratropium for Nebulization 3 milliLiter(s) Nebulizer every 6 hours  atorvastatin 10 milliGRAM(s) Oral at bedtime  docusate sodium 100 milliGRAM(s) Oral three times a day  dornase mercy Solution 2.5 milliGRAM(s) Inhalation daily  famotidine    Tablet 20 milliGRAM(s) Oral two times a day  fluticasone propionate   44 MICROgram(s) HFA Inhaler 1 Puff(s) Inhalation two times a day  heparin  Injectable 5000 Unit(s) SubCutaneous every 8 hours  HYDROmorphone PCA (1 mG/mL) 30 milliLiter(s) PCA Continuous PCA Continuous  lactated ringers. 1000 milliLiter(s) (10 mL/Hr) IV Continuous <Continuous>  sodium chloride 3%  Inhalation 4 milliLiter(s) Inhalation every 6 hours  tamsulosin 0.4 milliGRAM(s) Oral at bedtime    MEDICATIONS  (PRN):  acetaminophen  IVPB. 1000 milliGRAM(s) IV Intermittent once PRN Moderate Pain (4 - 6)  benzocaine 15 mG/menthol 3.6 mG Lozenge 1 Lozenge Oral every 4 hours PRN Sore Throat  HYDROmorphone PCA (1 mG/mL) Rescue Clinician Bolus 0.5 milliGRAM(s) IV Push every 15 minutes PRN for Pain Scale GREATER THAN 6  naloxone Injectable 0.1 milliGRAM(s) IV Push every 3 minutes PRN For ANY of the following changes in patient status:  A. RR LESS THAN 10 breaths per minute, B. Oxygen saturation LESS THAN 90%, C. Sedation score of 6  ondansetron Injectable 4 milliGRAM(s) IV Push every 6 hours PRN Nausea          Physical exam:                             General:               Pt is awake, appears to be in mild-moderate respiratory  distress                                                  Neuro:                  Nonfocal                             Cardiovascular:    S1 & S2, regular                           Respiratory:         Air entry is fair and equal on both sides, has bilateral conducted sounds / ronchi.                           GI:                       Soft, nondistended and nontender, Bowel sounds active                            Ext:                      No cyanosis or edema     Labs:                                                                           14.3   6.12  )-----------( 142      ( 02 Mar 2018 04:10 )             42.8             02-28    139  |  102  |  13  ----------------------------<  172<H>  3.8   |  20<L>  |  0.75    Ca    7.9<L>      28 Feb 2018 22:24  Phos  2.4     02-28  Mg     2.0     02-28                    CXR:  Mild bilateral congestion. Right chest tube in place      Plan:    General: 72yMale s/p Robot assisted RLL wedge resection and completion lobectomy  POD# 2. Pt went into acute shortness of breath , saturating only high 80's to low 90's, tachycardic. Pt was given chest PT, bronchodilators and Lasix. Pt also had difficulty emptying the bladder with >600cc residual urine on bladder scan. Bell was inserted for urinary retention.                             Neuro:                                         Pain control with Tylenol IV / Percocet                            Cardiovascular:                                          Continue hemodynamic monitoring.    HLD: Continue Lipitor                            Respiratory:                                         Pt is on face mask                                           In mild-moderate respiratory  distress - got better with bronchodilators / chest PT / Lasix                                         Using incentive spirometry                                          Monitor chest tube output                                         Chest tube to water seal                                                                  Asthma: Continue bronchodilators, pulmonary toilet                            GI                                         On DASH diet                                         Continue GI prophylaxis with Pepcid                                         Continue Zofran / Reglan for nausea - PRN	                                                                 Renal:                                         d/c IVF                                         Monitor I/Os and electrolytes                                         Bell for urinary retention / BPH     Continue Flomax                                                 Hem/ Onc:                                                                                  Monitor chest tube output &  signs of bleeding.                                          Follow CBC in AM                           Infectious disease:                                            No signs of infection. Monitor for fever / leukocytosis.                                          All surgical incision / chest tube  sites look clean                            Endocrine                                             Continue Accu-Checks with coverage    Pt is on SQ Heparin and Venodyne boots for DVT prophylaxis.     Pertinent clinical, laboratory, radiographic, hemodynamic, echocardiographic, respiratory data, microbiologic data and chart were reviewed and analyzed frequently throughout the course of the day and night  Patient seen, examined and plan discussed with CT Surgeon / CTICU team during rounds.         I have spent 45 minutes of critical care time with this pt between 12am and 8am.      Nayan Samuels MD

## 2018-03-02 NOTE — PROGRESS NOTE ADULT - SUBJECTIVE AND OBJECTIVE BOX
Anesthesia Pain Management Service- Attending Addendum    SUBJECTIVE: Pt doing well with IV PCA without problems reported.    Therapy:	  [ X] IV PCA	   [ ] Epidural           [ ] s/p Spinal Opoid              [ ] Postpartum infusion	  [ ] Patient controlled regional anesthesia (PCRA)    [ ] prn Analgesics    Allergies    Cherries: Cough (Other)  dust (Sneezing)  No Known Drug Allergies  shellfish (Other)  strawberry (Other)    Intolerances      MEDICATIONS  (STANDING):  acetylcysteine 20% Inhalation 4 milliLiter(s) Inhalation every 6 hours  atorvastatin 10 milliGRAM(s) Oral at bedtime  buDESOnide 160 MICROgram(s)/formoterol 4.5 MICROgram(s) Inhaler 2 Puff(s) Inhalation two times a day  dextrose 5%. 1000 milliLiter(s) (50 mL/Hr) IV Continuous <Continuous>  dextrose 50% Injectable 12.5 Gram(s) IV Push once  dextrose 50% Injectable 25 Gram(s) IV Push once  dextrose 50% Injectable 25 Gram(s) IV Push once  docusate sodium 100 milliGRAM(s) Oral three times a day  dornase mercy Solution 2.5 milliGRAM(s) Inhalation daily  famotidine    Tablet 20 milliGRAM(s) Oral two times a day  heparin  Injectable 5000 Unit(s) SubCutaneous every 8 hours  HYDROmorphone PCA (1 mG/mL) 30 milliLiter(s) PCA Continuous PCA Continuous  insulin lispro (HumaLOG) corrective regimen sliding scale   SubCutaneous Before meals and at bedtime  ipratropium    for Nebulization 500 MICROGram(s) Nebulizer every 6 hours  lactated ringers. 1000 milliLiter(s) (10 mL/Hr) IV Continuous <Continuous>  loratadine 10 milliGRAM(s) Oral daily  methylPREDNISolone sodium succinate Injectable 80 milliGRAM(s) IV Push every 8 hours  sodium chloride 3%  Inhalation 4 milliLiter(s) Inhalation two times a day  tamsulosin 0.4 milliGRAM(s) Oral at bedtime    MEDICATIONS  (PRN):  ALBUTerol    90 MICROgram(s) HFA Inhaler 2 Puff(s) Inhalation every 4 hours PRN Shortness of Breath  benzocaine 15 mG/menthol 3.6 mG Lozenge 1 Lozenge Oral every 4 hours PRN Sore Throat  dextrose Gel 1 Dose(s) Oral once PRN Blood Glucose LESS THAN 70 milliGRAM(s)/deciliter  glucagon  Injectable 1 milliGRAM(s) IntraMuscular once PRN Glucose LESS THAN 70 milligrams/deciliter  HYDROmorphone PCA (1 mG/mL) Rescue Clinician Bolus 0.5 milliGRAM(s) IV Push every 15 minutes PRN for Pain Scale GREATER THAN 6  naloxone Injectable 0.1 milliGRAM(s) IV Push every 3 minutes PRN For ANY of the following changes in patient status:  A. RR LESS THAN 10 breaths per minute, B. Oxygen saturation LESS THAN 90%, C. Sedation score of 6  ondansetron Injectable 4 milliGRAM(s) IV Push every 6 hours PRN Nausea      OBJECTIVE:   [X] No new signs     [ ] Other:    Side Effects:  [X ] None			[ ] Other:    Assessment of Catheter Site:		[ ] Intact		[ ] Other:    ASSESSMENT/PLAN  [ X] Continue current therapy    [ ] Therapy changed to:    [ ] IV PCA       [ ] Epidural     [ ] prn Analgesics     Comments:

## 2018-03-02 NOTE — PROGRESS NOTE ADULT - SUBJECTIVE AND OBJECTIVE BOX
Day __3_ of Anesthesia Pain Management Service    Allergies    Cherries: Cough (Other)  dust (Sneezing)  No Known Drug Allergies  shellfish (Other)  strawberry (Other)    Intolerances        SUBJECTIVE: "not a lot of pain"    Pain Scale Score	At rest: _2 	With Activity: ___ 	[ ] Refer to charted pain scores    THERAPY:    [ ] IV PCA Morphine		[ ] 5 mg/mL	[ ] 1 mg/mL  [X] IV PCA Hydromorphone	[ ] 5 mg/mL	[X] 1 mg/mL  [ ] IV PCA Fentanyl		[ ] 50 micrograms/mL    Demand dose _0.2mg_ lockout _6_ (minutes) Continuous Rate _0_ Total: 3.6mg___  Daily      MEDICATIONS  (STANDING):  ALBUTerol/ipratropium for Nebulization 3 milliLiter(s) Nebulizer every 6 hours  atorvastatin 10 milliGRAM(s) Oral at bedtime  docusate sodium 100 milliGRAM(s) Oral three times a day  dornase mercy Solution 2.5 milliGRAM(s) Inhalation daily  famotidine    Tablet 20 milliGRAM(s) Oral two times a day  fluticasone propionate   44 MICROgram(s) HFA Inhaler 1 Puff(s) Inhalation two times a day  heparin  Injectable 5000 Unit(s) SubCutaneous every 8 hours  HYDROmorphone PCA (1 mG/mL) 30 milliLiter(s) PCA Continuous PCA Continuous  lactated ringers. 1000 milliLiter(s) (10 mL/Hr) IV Continuous <Continuous>  sodium chloride 3%  Inhalation 4 milliLiter(s) Inhalation every 6 hours  tamsulosin 0.4 milliGRAM(s) Oral at bedtime    MEDICATIONS  (PRN):  benzocaine 15 mG/menthol 3.6 mG Lozenge 1 Lozenge Oral every 4 hours PRN Sore Throat  HYDROmorphone PCA (1 mG/mL) Rescue Clinician Bolus 0.5 milliGRAM(s) IV Push every 15 minutes PRN for Pain Scale GREATER THAN 6  naloxone Injectable 0.1 milliGRAM(s) IV Push every 3 minutes PRN For ANY of the following changes in patient status:  A. RR LESS THAN 10 breaths per minute, B. Oxygen saturation LESS THAN 90%, C. Sedation score of 6  ondansetron Injectable 4 milliGRAM(s) IV Push every 6 hours PRN Nausea      OBJECTIVE: A&Ox3, NAD, sitting up in chair     Sedation Score:	[X] Alert	[ ] Drowsy	[ ] Arousable	[ ] Asleep	[ ] Unresponsive    Side Effects:	[X] None	[ ] Nausea	[ ] Vomiting	[ ] Pruritus  		  [ ] Weakness		[ ] Numbness	[ ] Other:                              14.3   6.12  )-----------( 142      ( 02 Mar 2018 04:10 )             42.8       03-02    138  |  99  |  18  ----------------------------<  152<H>  4.0   |  23  |  0.97    Ca    8.1<L>      02 Mar 2018 04:10  Phos  2.5     03-02  Mg     2.4     03-02        ASSESSMENT/ PLAN    Therapy to  be:	[X] Continue   [ ] Discontinued   [ ] Change to prn Analgesics    Documentation and Verification of current medications:  [X] Done	[ ] Not done, not eligible  [ ] Not done, reason not given    [ ]  [place]  Reviewed and Copied to Chart    Comments: continue current therapy as per icu attending

## 2018-03-03 LAB
ALBUMIN SERPL ELPH-MCNC: 3.6 G/DL — SIGNIFICANT CHANGE UP (ref 3.3–5)
ALP SERPL-CCNC: 47 U/L — SIGNIFICANT CHANGE UP (ref 40–120)
ALT FLD-CCNC: 25 U/L — SIGNIFICANT CHANGE UP (ref 4–41)
APTT BLD: 38.3 SEC — HIGH (ref 27.5–37.4)
AST SERPL-CCNC: 20 U/L — SIGNIFICANT CHANGE UP (ref 4–40)
BACTERIA SPT RESP CULT: SIGNIFICANT CHANGE UP
BILIRUB SERPL-MCNC: 0.8 MG/DL — SIGNIFICANT CHANGE UP (ref 0.2–1.2)
BLD GP AB SCN SERPL QL: NEGATIVE — SIGNIFICANT CHANGE UP
BUN SERPL-MCNC: 16 MG/DL — SIGNIFICANT CHANGE UP (ref 7–23)
CALCIUM SERPL-MCNC: 8.2 MG/DL — LOW (ref 8.4–10.5)
CHLORIDE SERPL-SCNC: 102 MMOL/L — SIGNIFICANT CHANGE UP (ref 98–107)
CO2 SERPL-SCNC: 26 MMOL/L — SIGNIFICANT CHANGE UP (ref 22–31)
CREAT SERPL-MCNC: 0.93 MG/DL — SIGNIFICANT CHANGE UP (ref 0.5–1.3)
GLUCOSE SERPL-MCNC: 174 MG/DL — HIGH (ref 70–99)
HCT VFR BLD CALC: 40.8 % — SIGNIFICANT CHANGE UP (ref 39–50)
HGB BLD-MCNC: 13.3 G/DL — SIGNIFICANT CHANGE UP (ref 13–17)
INR BLD: 1.05 — SIGNIFICANT CHANGE UP (ref 0.88–1.17)
MAGNESIUM SERPL-MCNC: 2.8 MG/DL — HIGH (ref 1.6–2.6)
MCHC RBC-ENTMCNC: 29.8 PG — SIGNIFICANT CHANGE UP (ref 27–34)
MCHC RBC-ENTMCNC: 32.6 % — SIGNIFICANT CHANGE UP (ref 32–36)
MCV RBC AUTO: 91.5 FL — SIGNIFICANT CHANGE UP (ref 80–100)
NRBC # FLD: 0 — SIGNIFICANT CHANGE UP
PHOSPHATE SERPL-MCNC: 2 MG/DL — LOW (ref 2.5–4.5)
PLATELET # BLD AUTO: 168 K/UL — SIGNIFICANT CHANGE UP (ref 150–400)
PMV BLD: 11.1 FL — SIGNIFICANT CHANGE UP (ref 7–13)
POTASSIUM SERPL-MCNC: 4.2 MMOL/L — SIGNIFICANT CHANGE UP (ref 3.5–5.3)
POTASSIUM SERPL-SCNC: 4.2 MMOL/L — SIGNIFICANT CHANGE UP (ref 3.5–5.3)
PROT SERPL-MCNC: 7.4 G/DL — SIGNIFICANT CHANGE UP (ref 6–8.3)
PROTHROM AB SERPL-ACNC: 12.1 SEC — SIGNIFICANT CHANGE UP (ref 9.8–13.1)
RBC # BLD: 4.46 M/UL — SIGNIFICANT CHANGE UP (ref 4.2–5.8)
RBC # FLD: 13.2 % — SIGNIFICANT CHANGE UP (ref 10.3–14.5)
RH IG SCN BLD-IMP: POSITIVE — SIGNIFICANT CHANGE UP
SODIUM SERPL-SCNC: 139 MMOL/L — SIGNIFICANT CHANGE UP (ref 135–145)
WBC # BLD: 7.67 K/UL — SIGNIFICANT CHANGE UP (ref 3.8–10.5)
WBC # FLD AUTO: 7.67 K/UL — SIGNIFICANT CHANGE UP (ref 3.8–10.5)

## 2018-03-03 PROCEDURE — 99233 SBSQ HOSP IP/OBS HIGH 50: CPT

## 2018-03-03 PROCEDURE — 71045 X-RAY EXAM CHEST 1 VIEW: CPT | Mod: 26

## 2018-03-03 PROCEDURE — 71045 X-RAY EXAM CHEST 1 VIEW: CPT | Mod: 26,77

## 2018-03-03 RX ORDER — AMIODARONE HYDROCHLORIDE 400 MG/1
150 TABLET ORAL ONCE
Qty: 0 | Refills: 0 | Status: COMPLETED | OUTPATIENT
Start: 2018-03-03 | End: 2018-03-03

## 2018-03-03 RX ORDER — PANTOPRAZOLE SODIUM 20 MG/1
40 TABLET, DELAYED RELEASE ORAL
Qty: 0 | Refills: 0 | Status: DISCONTINUED | OUTPATIENT
Start: 2018-03-03 | End: 2018-03-12

## 2018-03-03 RX ORDER — MAGNESIUM SULFATE 500 MG/ML
1 VIAL (ML) INJECTION ONCE
Qty: 0 | Refills: 0 | Status: COMPLETED | OUTPATIENT
Start: 2018-03-03 | End: 2018-03-03

## 2018-03-03 RX ADMIN — Medication 100 MILLIGRAM(S): at 21:24

## 2018-03-03 RX ADMIN — Medication 2 MILLILITER(S): at 09:10

## 2018-03-03 RX ADMIN — SODIUM CHLORIDE 4 MILLILITER(S): 9 INJECTION INTRAMUSCULAR; INTRAVENOUS; SUBCUTANEOUS at 09:14

## 2018-03-03 RX ADMIN — HYDROMORPHONE HYDROCHLORIDE 30 MILLILITER(S): 2 INJECTION INTRAMUSCULAR; INTRAVENOUS; SUBCUTANEOUS at 19:00

## 2018-03-03 RX ADMIN — HEPARIN SODIUM 5000 UNIT(S): 5000 INJECTION INTRAVENOUS; SUBCUTANEOUS at 05:09

## 2018-03-03 RX ADMIN — Medication 2 MILLILITER(S): at 03:34

## 2018-03-03 RX ADMIN — AMIODARONE HYDROCHLORIDE 206 MILLIGRAM(S): 400 TABLET ORAL at 20:17

## 2018-03-03 RX ADMIN — Medication 1: at 16:28

## 2018-03-03 RX ADMIN — HYDROMORPHONE HYDROCHLORIDE 30 MILLILITER(S): 2 INJECTION INTRAMUSCULAR; INTRAVENOUS; SUBCUTANEOUS at 07:10

## 2018-03-03 RX ADMIN — HEPARIN SODIUM 5000 UNIT(S): 5000 INJECTION INTRAVENOUS; SUBCUTANEOUS at 14:04

## 2018-03-03 RX ADMIN — HEPARIN SODIUM 5000 UNIT(S): 5000 INJECTION INTRAVENOUS; SUBCUTANEOUS at 21:23

## 2018-03-03 RX ADMIN — Medication 100 MILLIGRAM(S): at 23:15

## 2018-03-03 RX ADMIN — Medication 500 MICROGRAM(S): at 21:00

## 2018-03-03 RX ADMIN — SODIUM CHLORIDE 4 MILLILITER(S): 9 INJECTION INTRAMUSCULAR; INTRAVENOUS; SUBCUTANEOUS at 21:12

## 2018-03-03 RX ADMIN — BUDESONIDE AND FORMOTEROL FUMARATE DIHYDRATE 2 PUFF(S): 160; 4.5 AEROSOL RESPIRATORY (INHALATION) at 21:19

## 2018-03-03 RX ADMIN — Medication 2: at 11:35

## 2018-03-03 RX ADMIN — Medication 100 MILLIGRAM(S): at 14:04

## 2018-03-03 RX ADMIN — LORATADINE 10 MILLIGRAM(S): 10 TABLET ORAL at 11:34

## 2018-03-03 RX ADMIN — ATORVASTATIN CALCIUM 10 MILLIGRAM(S): 80 TABLET, FILM COATED ORAL at 21:23

## 2018-03-03 RX ADMIN — Medication 500 MICROGRAM(S): at 09:10

## 2018-03-03 RX ADMIN — Medication 500 MICROGRAM(S): at 03:32

## 2018-03-03 RX ADMIN — Medication 1: at 21:35

## 2018-03-03 RX ADMIN — Medication 80 MILLIGRAM(S): at 05:09

## 2018-03-03 RX ADMIN — BUDESONIDE AND FORMOTEROL FUMARATE DIHYDRATE 2 PUFF(S): 160; 4.5 AEROSOL RESPIRATORY (INHALATION) at 09:14

## 2018-03-03 RX ADMIN — DORNASE ALFA 2.5 MILLIGRAM(S): 1 SOLUTION RESPIRATORY (INHALATION) at 09:13

## 2018-03-03 RX ADMIN — Medication 100 MILLIGRAM(S): at 05:09

## 2018-03-03 RX ADMIN — FAMOTIDINE 20 MILLIGRAM(S): 10 INJECTION INTRAVENOUS at 17:21

## 2018-03-03 RX ADMIN — SODIUM CHLORIDE 10 MILLILITER(S): 9 INJECTION, SOLUTION INTRAVENOUS at 08:24

## 2018-03-03 RX ADMIN — FAMOTIDINE 20 MILLIGRAM(S): 10 INJECTION INTRAVENOUS at 05:09

## 2018-03-03 RX ADMIN — Medication 1: at 07:50

## 2018-03-03 RX ADMIN — TAMSULOSIN HYDROCHLORIDE 0.4 MILLIGRAM(S): 0.4 CAPSULE ORAL at 21:23

## 2018-03-03 RX ADMIN — Medication 40 MILLIGRAM(S): at 14:04

## 2018-03-03 RX ADMIN — Medication 40 MILLIGRAM(S): at 21:23

## 2018-03-03 RX ADMIN — Medication 500 MICROGRAM(S): at 16:14

## 2018-03-03 NOTE — PROGRESS NOTE ADULT - SUBJECTIVE AND OBJECTIVE BOX
Anesthesia Pain Management Service    SUBJECTIVE: Patient is doing well with IV PCA and no significant problems reported.    Pain Scale Score	At rest: ___ 	With Activity: ___ 	[X ] Refer to charted pain scores    THERAPY:    [ ] IV PCA Morphine		[ ] 5 mg/mL	[ ] 1 mg/mL  [X ] IV PCA Hydromorphone	[ ] 5 mg/mL	[X ] 1 mg/mL  [ ] IV PCA Fentanyl		[ ] 50 micrograms/mL    Demand dose __0.2_ lockout __6_ (minutes) Continuous Rate _0__ Total: 2.8mg___  Daily      MEDICATIONS  (STANDING):  atorvastatin 10 milliGRAM(s) Oral at bedtime  buDESOnide 160 MICROgram(s)/formoterol 4.5 MICROgram(s) Inhaler 2 Puff(s) Inhalation two times a day  dextrose 5%. 1000 milliLiter(s) (50 mL/Hr) IV Continuous <Continuous>  dextrose 50% Injectable 12.5 Gram(s) IV Push once  dextrose 50% Injectable 25 Gram(s) IV Push once  dextrose 50% Injectable 25 Gram(s) IV Push once  docusate sodium 100 milliGRAM(s) Oral three times a day  dornase mercy Solution 2.5 milliGRAM(s) Inhalation daily  famotidine    Tablet 20 milliGRAM(s) Oral two times a day  heparin  Injectable 5000 Unit(s) SubCutaneous every 8 hours  HYDROmorphone PCA (1 mG/mL) 30 milliLiter(s) PCA Continuous PCA Continuous  insulin lispro (HumaLOG) corrective regimen sliding scale   SubCutaneous Before meals and at bedtime  ipratropium    for Nebulization 500 MICROGram(s) Nebulizer every 6 hours  lactated ringers. 1000 milliLiter(s) (10 mL/Hr) IV Continuous <Continuous>  loratadine 10 milliGRAM(s) Oral daily  methylPREDNISolone sodium succinate Injectable 80 milliGRAM(s) IV Push every 8 hours  sodium chloride 3%  Inhalation 4 milliLiter(s) Inhalation two times a day  tamsulosin 0.4 milliGRAM(s) Oral at bedtime    MEDICATIONS  (PRN):  ALBUTerol    90 MICROgram(s) HFA Inhaler 2 Puff(s) Inhalation every 4 hours PRN Shortness of Breath  benzocaine 15 mG/menthol 3.6 mG Lozenge 1 Lozenge Oral every 4 hours PRN Sore Throat  dextrose Gel 1 Dose(s) Oral once PRN Blood Glucose LESS THAN 70 milliGRAM(s)/deciliter  glucagon  Injectable 1 milliGRAM(s) IntraMuscular once PRN Glucose LESS THAN 70 milligrams/deciliter  HYDROmorphone PCA (1 mG/mL) Rescue Clinician Bolus 0.5 milliGRAM(s) IV Push every 15 minutes PRN for Pain Scale GREATER THAN 6  naloxone Injectable 0.1 milliGRAM(s) IV Push every 3 minutes PRN For ANY of the following changes in patient status:  A. RR LESS THAN 10 breaths per minute, B. Oxygen saturation LESS THAN 90%, C. Sedation score of 6  ondansetron Injectable 4 milliGRAM(s) IV Push every 6 hours PRN Nausea      OBJECTIVE:    Sedation Score:	[ X] Alert	[ ] Drowsy 	[ ] Arousable	[ ] Asleep	[ ] Unresponsive    Side Effects:	[X ] None	[ ] Nausea	[ ] Vomiting	[ ] Pruritus  		[ ] Other:    Vital Signs Last 24 Hrs  T(C): 35.9 (03 Mar 2018 04:00), Max: 37.1 (02 Mar 2018 16:00)  T(F): 96.6 (03 Mar 2018 04:00), Max: 98.7 (02 Mar 2018 16:00)  HR: 98 (03 Mar 2018 09:16) (89 - 114)  BP: 114/66 (03 Mar 2018 09:00) (92/62 - 123/70)  BP(mean): 77 (03 Mar 2018 09:00) (64 - 87)  RR: 17 (03 Mar 2018 09:00) (9 - 28)  SpO2: 94% (03 Mar 2018 09:00) (92% - 99%)    ASSESSMENT/ PLAN    Therapy to  be:	[ X] Continue   [ ] Discontinued   [ ] Change to prn Analgesics    Documentation and Verification of current medications:   [X] Done	[ ] Not done, not elligible    Comments:

## 2018-03-03 NOTE — PROVIDER CONTACT NOTE (MEDICATION) - ASSESSMENT
Patient hemodynamically stable.  Denies chest pain.  Denies shortness of breath. 12 lead EKG done and shown to provider.  No new orders at this time.

## 2018-03-03 NOTE — PROGRESS NOTE ADULT - SUBJECTIVE AND OBJECTIVE BOX
FLOYD MORENITA            MRN-[id number]         Cherries: Cough (Other)  dust (Sneezing)  No Known Drug Allergies  shellfish (Other)  strawberry (Other)             72 year old male presents to presurgical testing with diagnosis of other specific abnormal finding of lung field scheduled for navigational bronchoscopy biopsy right video assisted thoracoscopy, robotic assisted right lower lobectomy for 2/28/18. Pt reports fever and cough in 2/2018 treated with antibiotics, with Abnormal CXR. Pt followed up with CT chest which revealed a right lower lung mass. Pt denies SOB with exertion. Reports cough has resolved. (23 Feb 2018 13:36)      Procedure: Robot assisted RLL wedge resection and completion lobectomy   POD# 3    Issues:  Shortness of breath  Asthma / COPD  Lung nodule  Postop pain  Urinary retention  /  BPH  HLD                 Home Medications:  Advair Diskus 250 mcg-50 mcg inhalation powder: 1 puff(s) inhaled 2 times a day (28 Feb 2018 11:30)  atorvastatin 10 mg oral tablet: 1 tab(s) orally once a day, pm (28 Feb 2018 11:30)  fluticasone 50 mcg inhalation powder: 1 puff(s) inhaled 2 times a day (28 Feb 2018 11:30)  loratadine 10 mg oral tablet: 1 tab(s) orally once a day, pm (28 Feb 2018 11:30)  multivitamin: 1 tab(s) orally once a day, last dose 2/23/18 (28 Feb 2018 11:30)  Omega 3 acid: 1  orally 2 times a day, last dose 2/23/18 (28 Feb 2018 11:30)  Vitamin D 3: 1000 unit(s) orally once a day, last dose 2/23/18 (28 Feb 2018 11:30)      PAST MEDICAL & SURGICAL HISTORY:  Impaired glucose tolerance: on diet control  Other nonspecific abnormal finding of lung field  DALIA on CPAP  TB (tuberculosis): age 20:  Treated with meds X 1year  Fatty liver  Renal calculi: &#x27; 2005:  passed  BPH (Benign Prostatic Hyperplasia)  Asthma: mild  Elevated triglycerides with high cholesterol  Foot fracture, left: &quot; 90&#x27;s  History of prostate surgery: greenlight laser 2013  H/O left inguinal hernia repair  Status post cataract extraction: bilateral  Foot fracture, left: &#x27; 90&#x27;s:  ORIF        ICU Vital Signs Last 24 Hrs  T(C): 36.2 (03 Mar 2018 08:00), Max: 37.1 (02 Mar 2018 16:00)  T(F): 97.1 (03 Mar 2018 08:00), Max: 98.7 (02 Mar 2018 16:00)  HR: 103 (03 Mar 2018 11:07) (89 - 114)  BP: 105/59 (03 Mar 2018 11:00) (94/72 - 130/61)  BP(mean): 67 (03 Mar 2018 11:00) (64 - 87)  ABP: --  ABP(mean): --  RR: 17 (03 Mar 2018 11:00) (9 - 28)  SpO2: 95% (03 Mar 2018 11:07) (92% - 99%)    I&O's Detail    02 Mar 2018 07:01  -  03 Mar 2018 07:00  --------------------------------------------------------  IN:    IV PiggyBack: 100 mL    lactated ringers.: 240 mL    Oral Fluid: 1120 mL  Total IN: 1460 mL    OUT:    Chest Tube: 210 mL    Indwelling Catheter - Urethral: 2550 mL  Total OUT: 2760 mL    Total NET: -1300 mL      03 Mar 2018 07:01  -  03 Mar 2018 11:19  --------------------------------------------------------  IN:    lactated ringers.: 40 mL  Total IN: 40 mL    OUT:    Chest Tube: 30 mL    Indwelling Catheter - Urethral: 215 mL  Total OUT: 245 mL    Total NET: -205 mL        CAPILLARY BLOOD GLUCOSE      POCT Blood Glucose.: 160 mg/dL (03 Mar 2018 07:46)      Home Medications:  Advair Diskus 250 mcg-50 mcg inhalation powder: 1 puff(s) inhaled 2 times a day (28 Feb 2018 11:30)  atorvastatin 10 mg oral tablet: 1 tab(s) orally once a day, pm (28 Feb 2018 11:30)  fluticasone 50 mcg inhalation powder: 1 puff(s) inhaled 2 times a day (28 Feb 2018 11:30)  loratadine 10 mg oral tablet: 1 tab(s) orally once a day, pm (28 Feb 2018 11:30)  multivitamin: 1 tab(s) orally once a day, last dose 2/23/18 (28 Feb 2018 11:30)  Omega 3 acid: 1  orally 2 times a day, last dose 2/23/18 (28 Feb 2018 11:30)  Vitamin D 3: 1000 unit(s) orally once a day, last dose 2/23/18 (28 Feb 2018 11:30)      MEDICATIONS  (STANDING):  atorvastatin 10 milliGRAM(s) Oral at bedtime  buDESOnide 160 MICROgram(s)/formoterol 4.5 MICROgram(s) Inhaler 2 Puff(s) Inhalation two times a day  dextrose 5%. 1000 milliLiter(s) (50 mL/Hr) IV Continuous <Continuous>  dextrose 50% Injectable 12.5 Gram(s) IV Push once  dextrose 50% Injectable 25 Gram(s) IV Push once  dextrose 50% Injectable 25 Gram(s) IV Push once  docusate sodium 100 milliGRAM(s) Oral three times a day  dornase mercy Solution 2.5 milliGRAM(s) Inhalation daily  famotidine    Tablet 20 milliGRAM(s) Oral two times a day  heparin  Injectable 5000 Unit(s) SubCutaneous every 8 hours  HYDROmorphone PCA (1 mG/mL) 30 milliLiter(s) PCA Continuous PCA Continuous  insulin lispro (HumaLOG) corrective regimen sliding scale   SubCutaneous Before meals and at bedtime  ipratropium    for Nebulization 500 MICROGram(s) Nebulizer every 6 hours  lactated ringers. 1000 milliLiter(s) (10 mL/Hr) IV Continuous <Continuous>  loratadine 10 milliGRAM(s) Oral daily  methylPREDNISolone sodium succinate Injectable 40 milliGRAM(s) IV Push every 8 hours  sodium chloride 3%  Inhalation 4 milliLiter(s) Inhalation two times a day  tamsulosin 0.4 milliGRAM(s) Oral at bedtime    MEDICATIONS  (PRN):  ALBUTerol    90 MICROgram(s) HFA Inhaler 2 Puff(s) Inhalation every 4 hours PRN Shortness of Breath  benzocaine 15 mG/menthol 3.6 mG Lozenge 1 Lozenge Oral every 4 hours PRN Sore Throat  dextrose Gel 1 Dose(s) Oral once PRN Blood Glucose LESS THAN 70 milliGRAM(s)/deciliter  glucagon  Injectable 1 milliGRAM(s) IntraMuscular once PRN Glucose LESS THAN 70 milligrams/deciliter  HYDROmorphone PCA (1 mG/mL) Rescue Clinician Bolus 0.5 milliGRAM(s) IV Push every 15 minutes PRN for Pain Scale GREATER THAN 6  naloxone Injectable 0.1 milliGRAM(s) IV Push every 3 minutes PRN For ANY of the following changes in patient status:  A. RR LESS THAN 10 breaths per minute, B. Oxygen saturation LESS THAN 90%, C. Sedation score of 6  ondansetron Injectable 4 milliGRAM(s) IV Push every 6 hours PRN Nausea          Physical exam:                 General:               Pt is awake, slightly short of breath but feels better than yesterday                                                  Neuro:                 Nonfocal                             Cardiovascular:    S1 & S2, regular                           Respiratory:         Air entry is fair and equal on both sides, has bilateral conducted sounds / ronchi.                           GI:                       Soft, nondistended and nontender, Bowel sounds active                            Ext:                      No cyanosis or edema                CXR:    < from: Xray Chest 1 View- PORTABLE-Routine (03.03.18 @ 05:13) >  Right-sided chest tube unchanged. Decreasing haziness at the right lung   base may represent resolving atelectasis/effusion. No pneumothorax.   Residual subcutaneous emphysema are in the right neck.        Labs:                                                                           13.3   7.67  )-----------( 168      ( 03 Mar 2018 05:00 )             40.8             03-03    139  |  102  |  16  ----------------------------<  174<H>  4.2   |  26  |  0.93    Ca    8.2<L>      03 Mar 2018 05:00  Phos  2.0     03-03  Mg     2.8     03-03    TPro  7.4  /  Alb  3.6  /  TBili  0.8  /  DBili  x   /  AST  20  /  ALT  25  /  AlkPhos  47  03-03                  PT/INR - ( 03 Mar 2018 05:00 )   PT: 12.1 SEC;   INR: 1.05          PTT - ( 03 Mar 2018 05:00 )  PTT:38.3 SEC  LIVER FUNCTIONS - ( 03 Mar 2018 05:00 )  Alb: 3.6 g/dL / Pro: 7.4 g/dL / ALK PHOS: 47 u/L / ALT: 25 u/L / AST: 20 u/L / GGT: x           General: 72yMale s/p Robot assisted RLL wedge resection and completion lobectomy  POD# 2. Pt went into acute shortness of breath , saturating only high 80's to low 90's, tachycardic. Pt was given chest PT, bronchodilators and Lasix. Pt also had difficulty emptying the bladder with >600cc residual urine on bladder scan. Bell was inserted for urinary retention.                             Neuro:                                         Pain control with Tylenol IV / Percocet                            Cardiovascular:                                          Continue hemodynamic monitoring.    HLD: Continue Lipitor    Tachycardic - probably secondary to respiratory status. Monitor                            Respiratory:                                         Pt is on NC                                           slightly short of breath but feels better than yesterday   - got better with bronchodilators / IV STEROIDS / chest PT / Lasix     Taper Solu-Medrol 40mg Q8hrs                                         Using incentive spirometry                                          Monitor chest tube output                                         Chest tube to water seal - If output is less will D/C today                                                                  Asthma: Continue bronchodilators, pulmonary toilet                            GI                                         On DASH diet                                         Continue GI prophylaxis with Pepcid                                         Continue Zofran / Reglan for nausea - PRN	                                                                 Renal:                                         d/cd IVF                                         Monitor I/Os and electrolytes                                         Bell for urinary retention / BPH.  D/C Bell at midnight     Continue Flomax                                                 Hem/ Onc:                                                                                  Monitor chest tube output &  signs of bleeding.                                          Follow CBC in AM                           Infectious disease:                                            No signs of infection. Monitor for fever / leukocytosis.                                          All surgical incision / chest tube  sites look clean                            Endocrine                                             Continue Accu-Checks with coverage    Pt is on SQ Heparin and Venodyne boots for DVT prophylaxis.     Pertinent clinical, laboratory, radiographic, hemodynamic, echocardiographic, respiratory data, microbiologic data and chart were reviewed and analyzed frequently throughout the course of the day and night  Patient seen, examined and plan discussed with CT Surgeon Dr. Maier / CTICU team during rounds.                  Nayan Samuels MD

## 2018-03-04 LAB
BUN SERPL-MCNC: 19 MG/DL — SIGNIFICANT CHANGE UP (ref 7–23)
CA-I BLD-SCNC: 0.97 MMOL/L — LOW (ref 1.03–1.23)
CALCIUM SERPL-MCNC: 8.2 MG/DL — LOW (ref 8.4–10.5)
CHLORIDE SERPL-SCNC: 104 MMOL/L — SIGNIFICANT CHANGE UP (ref 98–107)
CO2 SERPL-SCNC: 24 MMOL/L — SIGNIFICANT CHANGE UP (ref 22–31)
CREAT SERPL-MCNC: 0.81 MG/DL — SIGNIFICANT CHANGE UP (ref 0.5–1.3)
GLUCOSE SERPL-MCNC: 172 MG/DL — HIGH (ref 70–99)
HCT VFR BLD CALC: 38.8 % — LOW (ref 39–50)
HGB BLD-MCNC: 12.9 G/DL — LOW (ref 13–17)
MAGNESIUM SERPL-MCNC: 2.2 MG/DL — SIGNIFICANT CHANGE UP (ref 1.6–2.6)
MCHC RBC-ENTMCNC: 30.2 PG — SIGNIFICANT CHANGE UP (ref 27–34)
MCHC RBC-ENTMCNC: 33.2 % — SIGNIFICANT CHANGE UP (ref 32–36)
MCV RBC AUTO: 90.9 FL — SIGNIFICANT CHANGE UP (ref 80–100)
NRBC # FLD: 0 — SIGNIFICANT CHANGE UP
PHOSPHATE SERPL-MCNC: 2.6 MG/DL — SIGNIFICANT CHANGE UP (ref 2.5–4.5)
PLATELET # BLD AUTO: 178 K/UL — SIGNIFICANT CHANGE UP (ref 150–400)
PMV BLD: 11 FL — SIGNIFICANT CHANGE UP (ref 7–13)
POTASSIUM SERPL-MCNC: 4 MMOL/L — SIGNIFICANT CHANGE UP (ref 3.5–5.3)
POTASSIUM SERPL-SCNC: 4 MMOL/L — SIGNIFICANT CHANGE UP (ref 3.5–5.3)
RBC # BLD: 4.27 M/UL — SIGNIFICANT CHANGE UP (ref 4.2–5.8)
RBC # FLD: 13.2 % — SIGNIFICANT CHANGE UP (ref 10.3–14.5)
SODIUM SERPL-SCNC: 141 MMOL/L — SIGNIFICANT CHANGE UP (ref 135–145)
WBC # BLD: 9.92 K/UL — SIGNIFICANT CHANGE UP (ref 3.8–10.5)
WBC # FLD AUTO: 9.92 K/UL — SIGNIFICANT CHANGE UP (ref 3.8–10.5)

## 2018-03-04 PROCEDURE — 99233 SBSQ HOSP IP/OBS HIGH 50: CPT

## 2018-03-04 PROCEDURE — 71045 X-RAY EXAM CHEST 1 VIEW: CPT | Mod: 26

## 2018-03-04 RX ORDER — SENNA PLUS 8.6 MG/1
2 TABLET ORAL AT BEDTIME
Qty: 0 | Refills: 0 | Status: DISCONTINUED | OUTPATIENT
Start: 2018-03-04 | End: 2018-03-12

## 2018-03-04 RX ORDER — POLYETHYLENE GLYCOL 3350 17 G/17G
17 POWDER, FOR SOLUTION ORAL DAILY
Qty: 0 | Refills: 0 | Status: DISCONTINUED | OUTPATIENT
Start: 2018-03-04 | End: 2018-03-12

## 2018-03-04 RX ORDER — OXYCODONE HYDROCHLORIDE 5 MG/1
10 TABLET ORAL EVERY 4 HOURS
Qty: 0 | Refills: 0 | Status: DISCONTINUED | OUTPATIENT
Start: 2018-03-04 | End: 2018-03-11

## 2018-03-04 RX ORDER — ACETAMINOPHEN 500 MG
1000 TABLET ORAL ONCE
Qty: 0 | Refills: 0 | Status: COMPLETED | OUTPATIENT
Start: 2018-03-04 | End: 2018-03-04

## 2018-03-04 RX ORDER — OXYCODONE HYDROCHLORIDE 5 MG/1
5 TABLET ORAL EVERY 4 HOURS
Qty: 0 | Refills: 0 | Status: DISCONTINUED | OUTPATIENT
Start: 2018-03-04 | End: 2018-03-11

## 2018-03-04 RX ORDER — ACETAMINOPHEN 500 MG
650 TABLET ORAL EVERY 6 HOURS
Qty: 0 | Refills: 0 | Status: DISCONTINUED | OUTPATIENT
Start: 2018-03-04 | End: 2018-03-12

## 2018-03-04 RX ADMIN — Medication 1: at 22:01

## 2018-03-04 RX ADMIN — Medication 1: at 18:03

## 2018-03-04 RX ADMIN — Medication 1: at 08:24

## 2018-03-04 RX ADMIN — Medication 40 MILLIGRAM(S): at 06:11

## 2018-03-04 RX ADMIN — Medication 100 MILLIGRAM(S): at 21:53

## 2018-03-04 RX ADMIN — Medication 40 MILLIGRAM(S): at 21:53

## 2018-03-04 RX ADMIN — Medication 100 MILLIGRAM(S): at 13:51

## 2018-03-04 RX ADMIN — Medication 1000 MILLIGRAM(S): at 03:30

## 2018-03-04 RX ADMIN — OXYCODONE HYDROCHLORIDE 5 MILLIGRAM(S): 5 TABLET ORAL at 14:30

## 2018-03-04 RX ADMIN — ATORVASTATIN CALCIUM 10 MILLIGRAM(S): 80 TABLET, FILM COATED ORAL at 21:53

## 2018-03-04 RX ADMIN — HEPARIN SODIUM 5000 UNIT(S): 5000 INJECTION INTRAVENOUS; SUBCUTANEOUS at 21:54

## 2018-03-04 RX ADMIN — HEPARIN SODIUM 5000 UNIT(S): 5000 INJECTION INTRAVENOUS; SUBCUTANEOUS at 05:42

## 2018-03-04 RX ADMIN — Medication 500 MICROGRAM(S): at 16:49

## 2018-03-04 RX ADMIN — Medication 400 MILLIGRAM(S): at 03:00

## 2018-03-04 RX ADMIN — Medication 500 MICROGRAM(S): at 22:34

## 2018-03-04 RX ADMIN — OXYCODONE HYDROCHLORIDE 5 MILLIGRAM(S): 5 TABLET ORAL at 13:52

## 2018-03-04 RX ADMIN — SENNA PLUS 2 TABLET(S): 8.6 TABLET ORAL at 21:53

## 2018-03-04 RX ADMIN — Medication 2: at 13:36

## 2018-03-04 RX ADMIN — HEPARIN SODIUM 5000 UNIT(S): 5000 INJECTION INTRAVENOUS; SUBCUTANEOUS at 13:52

## 2018-03-04 RX ADMIN — Medication 40 MILLIGRAM(S): at 13:52

## 2018-03-04 RX ADMIN — LORATADINE 10 MILLIGRAM(S): 10 TABLET ORAL at 13:51

## 2018-03-04 RX ADMIN — DORNASE ALFA 2.5 MILLIGRAM(S): 1 SOLUTION RESPIRATORY (INHALATION) at 09:31

## 2018-03-04 RX ADMIN — SODIUM CHLORIDE 4 MILLILITER(S): 9 INJECTION INTRAMUSCULAR; INTRAVENOUS; SUBCUTANEOUS at 09:29

## 2018-03-04 RX ADMIN — BUDESONIDE AND FORMOTEROL FUMARATE DIHYDRATE 2 PUFF(S): 160; 4.5 AEROSOL RESPIRATORY (INHALATION) at 09:30

## 2018-03-04 RX ADMIN — OXYCODONE HYDROCHLORIDE 10 MILLIGRAM(S): 5 TABLET ORAL at 21:54

## 2018-03-04 RX ADMIN — Medication 500 MICROGRAM(S): at 09:28

## 2018-03-04 RX ADMIN — TAMSULOSIN HYDROCHLORIDE 0.4 MILLIGRAM(S): 0.4 CAPSULE ORAL at 21:54

## 2018-03-04 RX ADMIN — OXYCODONE HYDROCHLORIDE 10 MILLIGRAM(S): 5 TABLET ORAL at 22:30

## 2018-03-04 RX ADMIN — Medication 100 MILLIGRAM(S): at 05:42

## 2018-03-04 RX ADMIN — BUDESONIDE AND FORMOTEROL FUMARATE DIHYDRATE 2 PUFF(S): 160; 4.5 AEROSOL RESPIRATORY (INHALATION) at 22:01

## 2018-03-04 RX ADMIN — PANTOPRAZOLE SODIUM 40 MILLIGRAM(S): 20 TABLET, DELAYED RELEASE ORAL at 06:57

## 2018-03-04 NOTE — PROGRESS NOTE ADULT - SUBJECTIVE AND OBJECTIVE BOX
Anesthesia Pain Management Service    SUBJECTIVE: Patient is doing well with IV PCA and no significant problems reported.    Pain Scale Score	At rest: ___ 	With Activity: ___ 	[X ] Refer to charted pain scores    THERAPY:    [ ] IV PCA Morphine		[ ] 5 mg/mL	[ ] 1 mg/mL  [X ] IV PCA Hydromorphone	[ ] 5 mg/mL	[X ] 1 mg/mL  [ ] IV PCA Fentanyl		[ ] 50 micrograms/mL    Demand dose __0.2_ lockout __6_ (minutes) Continuous Rate _0__ Total:cleared ___  Daily      MEDICATIONS  (STANDING):  atorvastatin 10 milliGRAM(s) Oral at bedtime  benzonatate 100 milliGRAM(s) Oral three times a day  buDESOnide 160 MICROgram(s)/formoterol 4.5 MICROgram(s) Inhaler 2 Puff(s) Inhalation two times a day  dextrose 5%. 1000 milliLiter(s) (50 mL/Hr) IV Continuous <Continuous>  dextrose 50% Injectable 12.5 Gram(s) IV Push once  dextrose 50% Injectable 25 Gram(s) IV Push once  dextrose 50% Injectable 25 Gram(s) IV Push once  diltiazem    Tablet 30 milliGRAM(s) Oral every 8 hours  docusate sodium 100 milliGRAM(s) Oral three times a day  heparin  Injectable 5000 Unit(s) SubCutaneous every 8 hours  HYDROmorphone PCA (1 mG/mL) 30 milliLiter(s) PCA Continuous PCA Continuous  insulin lispro (HumaLOG) corrective regimen sliding scale   SubCutaneous Before meals and at bedtime  ipratropium    for Nebulization 500 MICROGram(s) Nebulizer every 6 hours  lactated ringers. 1000 milliLiter(s) (10 mL/Hr) IV Continuous <Continuous>  loratadine 10 milliGRAM(s) Oral daily  methylPREDNISolone sodium succinate Injectable 40 milliGRAM(s) IV Push every 8 hours  pantoprazole    Tablet 40 milliGRAM(s) Oral before breakfast  senna 2 Tablet(s) Oral at bedtime  tamsulosin 0.4 milliGRAM(s) Oral at bedtime    MEDICATIONS  (PRN):  acetaminophen   Tablet. 650 milliGRAM(s) Oral every 6 hours PRN Mild Pain (1 - 3)  ALBUTerol    90 MICROgram(s) HFA Inhaler 2 Puff(s) Inhalation every 4 hours PRN Shortness of Breath  benzocaine 15 mG/menthol 3.6 mG Lozenge 1 Lozenge Oral every 4 hours PRN Sore Throat  bisacodyl Suppository 10 milliGRAM(s) Rectal daily PRN Constipation  dextrose Gel 1 Dose(s) Oral once PRN Blood Glucose LESS THAN 70 milliGRAM(s)/deciliter  glucagon  Injectable 1 milliGRAM(s) IntraMuscular once PRN Glucose LESS THAN 70 milligrams/deciliter  guaiFENesin    Syrup 100 milliGRAM(s) Oral every 6 hours PRN Cough  HYDROmorphone PCA (1 mG/mL) Rescue Clinician Bolus 0.5 milliGRAM(s) IV Push every 15 minutes PRN for Pain Scale GREATER THAN 6  naloxone Injectable 0.1 milliGRAM(s) IV Push every 3 minutes PRN For ANY of the following changes in patient status:  A. RR LESS THAN 10 breaths per minute, B. Oxygen saturation LESS THAN 90%, C. Sedation score of 6  ondansetron Injectable 4 milliGRAM(s) IV Push every 6 hours PRN Nausea  oxyCODONE    IR 5 milliGRAM(s) Oral every 4 hours PRN Moderate Pain (4 - 6)  oxyCODONE    IR 10 milliGRAM(s) Oral every 4 hours PRN Severe Pain (7 - 10)  polyethylene glycol 3350 17 Gram(s) Oral daily PRN Constipation      OBJECTIVE:    Sedation Score:	[ X] Alert	[ ] Drowsy 	[ ] Arousable	[ ] Asleep	[ ] Unresponsive    Side Effects:	[X ] None	[ ] Nausea	[ ] Vomiting	[ ] Pruritus  		[ ] Other:    Vital Signs Last 24 Hrs  T(C): 36.3 (04 Mar 2018 08:00), Max: 36.8 (03 Mar 2018 16:00)  T(F): 97.3 (04 Mar 2018 08:00), Max: 98.3 (03 Mar 2018 16:00)  HR: 84 (04 Mar 2018 09:32) (81 - 117)  BP: 114/60 (04 Mar 2018 09:00) (98/62 - 130/61)  BP(mean): 73 (04 Mar 2018 09:00) (65 - 90)  RR: 19 (04 Mar 2018 09:00) (10 - 31)  SpO2: 92% (04 Mar 2018 09:00) (90% - 98%)    ASSESSMENT/ PLAN    Therapy to  be:	[ X] Continue   [ ] Discontinued   [ ] Change to prn Analgesics    Documentation and Verification of current medications:   [X] Done	[ ] Not done, not elligible    Comments:

## 2018-03-04 NOTE — PROGRESS NOTE ADULT - SUBJECTIVE AND OBJECTIVE BOX
FLOYD MORENITA            MRN-[id number]         Cherries: Cough (Other)  dust (Sneezing)  No Known Drug Allergies  shellfish (Other)  strawberry (Other)             72 year old male presents to presurgical testing with diagnosis of other specific abnormal finding of lung field scheduled for navigational bronchoscopy biopsy right video assisted thoracoscopy, robotic assisted right lower lobectomy for 2/28/18. Pt reports fever and cough in 2/2018 treated with antibiotics, with Abnormal CXR. Pt followed up with CT chest which revealed a right lower lung mass. Pt denies SOB with exertion. Reports cough has resolved. (23 Feb 2018 13:36)      Procedure: Robot assisted RLL wedge resection and completion lobectomy   POD# 4    Issues:  Cough  Asthma / COPD  Lung nodule  Postop pain  Urinary retention  /  BPH  HLD  Constipation  PAF  Right apical pneumo  Hyperglycemia                   Home Medications:  Advair Diskus 250 mcg-50 mcg inhalation powder: 1 puff(s) inhaled 2 times a day (28 Feb 2018 11:30)  atorvastatin 10 mg oral tablet: 1 tab(s) orally once a day, pm (28 Feb 2018 11:30)  fluticasone 50 mcg inhalation powder: 1 puff(s) inhaled 2 times a day (28 Feb 2018 11:30)  loratadine 10 mg oral tablet: 1 tab(s) orally once a day, pm (28 Feb 2018 11:30)  multivitamin: 1 tab(s) orally once a day, last dose 2/23/18 (28 Feb 2018 11:30)  Omega 3 acid: 1  orally 2 times a day, last dose 2/23/18 (28 Feb 2018 11:30)  Vitamin D 3: 1000 unit(s) orally once a day, last dose 2/23/18 (28 Feb 2018 11:30)      PAST MEDICAL & SURGICAL HISTORY:  Impaired glucose tolerance: on diet control  Other nonspecific abnormal finding of lung field  DALIA on CPAP  TB (tuberculosis): age 20:  Treated with meds X 1year  Fatty liver  Renal calculi: &#x27; 2005:  passed  BPH (Benign Prostatic Hyperplasia)  Asthma: mild  Elevated triglycerides with high cholesterol  Foot fracture, left: &quot; 90&#x27;s  History of prostate surgery: greenlight laser 2013  H/O left inguinal hernia repair  Status post cataract extraction: bilateral  Foot fracture, left: &#x27; 90&#x27;s:  ORIF        ICU Vital Signs Last 24 Hrs  T(C): 36.6 (04 Mar 2018 00:00), Max: 36.8 (03 Mar 2018 16:00)  T(F): 97.8 (04 Mar 2018 00:00), Max: 98.3 (03 Mar 2018 16:00)  HR: 81 (04 Mar 2018 04:00) (81 - 117)  BP: 101/62 (04 Mar 2018 04:00) (98/62 - 130/61)  BP(mean): 70 (04 Mar 2018 04:00) (65 - 86)  ABP: --  ABP(mean): --  RR: 11 (04 Mar 2018 04:00) (10 - 31)  SpO2: 92% (04 Mar 2018 04:00) (90% - 98%)    I&O's Detail    02 Mar 2018 07:01  -  03 Mar 2018 07:00  --------------------------------------------------------  IN:    IV PiggyBack: 100 mL    lactated ringers.: 240 mL    Oral Fluid: 1120 mL  Total IN: 1460 mL    OUT:    Chest Tube: 210 mL    Indwelling Catheter - Urethral: 2550 mL  Total OUT: 2760 mL    Total NET: -1300 mL      03 Mar 2018 07:01  -  04 Mar 2018 06:32  --------------------------------------------------------  IN:    lactated ringers.: 210 mL    Oral Fluid: 490 mL  Total IN: 700 mL    OUT:    Chest Tube: 30 mL    Indwelling Catheter - Urethral: 1474 mL  Total OUT: 1504 mL    Total NET: -804 mL        CAPILLARY BLOOD GLUCOSE      POCT Blood Glucose.: 198 mg/dL (03 Mar 2018 21:34)      Home Medications:  Advair Diskus 250 mcg-50 mcg inhalation powder: 1 puff(s) inhaled 2 times a day (28 Feb 2018 11:30)  atorvastatin 10 mg oral tablet: 1 tab(s) orally once a day, pm (28 Feb 2018 11:30)  fluticasone 50 mcg inhalation powder: 1 puff(s) inhaled 2 times a day (28 Feb 2018 11:30)  loratadine 10 mg oral tablet: 1 tab(s) orally once a day, pm (28 Feb 2018 11:30)  multivitamin: 1 tab(s) orally once a day, last dose 2/23/18 (28 Feb 2018 11:30)  Omega 3 acid: 1  orally 2 times a day, last dose 2/23/18 (28 Feb 2018 11:30)  Vitamin D 3: 1000 unit(s) orally once a day, last dose 2/23/18 (28 Feb 2018 11:30)      MEDICATIONS  (STANDING):  acetaminophen  IVPB. 1000 milliGRAM(s) IV Intermittent once  atorvastatin 10 milliGRAM(s) Oral at bedtime  benzonatate 100 milliGRAM(s) Oral three times a day  buDESOnide 160 MICROgram(s)/formoterol 4.5 MICROgram(s) Inhaler 2 Puff(s) Inhalation two times a day  dextrose 5%. 1000 milliLiter(s) (50 mL/Hr) IV Continuous <Continuous>  dextrose 50% Injectable 12.5 Gram(s) IV Push once  dextrose 50% Injectable 25 Gram(s) IV Push once  dextrose 50% Injectable 25 Gram(s) IV Push once  diltiazem    Tablet 30 milliGRAM(s) Oral every 8 hours  docusate sodium 100 milliGRAM(s) Oral three times a day  dornase mercy Solution 2.5 milliGRAM(s) Inhalation daily  heparin  Injectable 5000 Unit(s) SubCutaneous every 8 hours  HYDROmorphone PCA (1 mG/mL) 30 milliLiter(s) PCA Continuous PCA Continuous  insulin lispro (HumaLOG) corrective regimen sliding scale   SubCutaneous Before meals and at bedtime  ipratropium    for Nebulization 500 MICROGram(s) Nebulizer every 6 hours  lactated ringers. 1000 milliLiter(s) (10 mL/Hr) IV Continuous <Continuous>  loratadine 10 milliGRAM(s) Oral daily  methylPREDNISolone sodium succinate Injectable 40 milliGRAM(s) IV Push every 8 hours  pantoprazole    Tablet 40 milliGRAM(s) Oral before breakfast  senna 2 Tablet(s) Oral at bedtime  sodium chloride 3%  Inhalation 4 milliLiter(s) Inhalation two times a day  tamsulosin 0.4 milliGRAM(s) Oral at bedtime    MEDICATIONS  (PRN):  acetaminophen   Tablet. 650 milliGRAM(s) Oral every 6 hours PRN Mild Pain (1 - 3)  ALBUTerol    90 MICROgram(s) HFA Inhaler 2 Puff(s) Inhalation every 4 hours PRN Shortness of Breath  benzocaine 15 mG/menthol 3.6 mG Lozenge 1 Lozenge Oral every 4 hours PRN Sore Throat  bisacodyl Suppository 10 milliGRAM(s) Rectal daily PRN Constipation  dextrose Gel 1 Dose(s) Oral once PRN Blood Glucose LESS THAN 70 milliGRAM(s)/deciliter  glucagon  Injectable 1 milliGRAM(s) IntraMuscular once PRN Glucose LESS THAN 70 milligrams/deciliter  guaiFENesin    Syrup 100 milliGRAM(s) Oral every 6 hours PRN Cough  HYDROmorphone PCA (1 mG/mL) Rescue Clinician Bolus 0.5 milliGRAM(s) IV Push every 15 minutes PRN for Pain Scale GREATER THAN 6  naloxone Injectable 0.1 milliGRAM(s) IV Push every 3 minutes PRN For ANY of the following changes in patient status:  A. RR LESS THAN 10 breaths per minute, B. Oxygen saturation LESS THAN 90%, C. Sedation score of 6  ondansetron Injectable 4 milliGRAM(s) IV Push every 6 hours PRN Nausea  oxyCODONE    IR 5 milliGRAM(s) Oral every 4 hours PRN Moderate Pain (4 - 6)  oxyCODONE    IR 10 milliGRAM(s) Oral every 4 hours PRN Severe Pain (7 - 10)  polyethylene glycol 3350 17 Gram(s) Oral daily PRN Constipation          Physical exam:   General:               Pt is awake, slightly short of breath but feels better, continues to have non-productive cough                                               Neuro:                 Nonfocal                             Cardiovascular:    S1 & S2, regular                           Respiratory:         Air entry is fair and equal on both sides, has bilateral conducted sounds / rhonchi                           GI:                       Soft, nondistended and nontender, Bowel sounds active                            Ext:                      No cyanosis or edema                            Labs:                                                                           12.9   9.92  )-----------( 178      ( 04 Mar 2018 05:10 )             38.8             03-04    141  |  104  |  19  ----------------------------<  172<H>  4.0   |  24  |  0.81    Ca    8.2<L>      04 Mar 2018 05:10  Phos  2.6     03-04  Mg     2.2     03-04    TPro  7.4  /  Alb  3.6  /  TBili  0.8  /  DBili  x   /  AST  20  /  ALT  25  /  AlkPhos  47  03-03                  PT/INR - ( 03 Mar 2018 05:00 )   PT: 12.1 SEC;   INR: 1.05          PTT - ( 03 Mar 2018 05:00 )  PTT:38.3 SEC  LIVER FUNCTIONS - ( 03 Mar 2018 05:00 )  Alb: 3.6 g/dL / Pro: 7.4 g/dL / ALK PHOS: 47 u/L / ALT: 25 u/L / AST: 20 u/L / GGT: x               CXR:    Small right apical pneumo. Mild bilateral congestion      Plan:      General: 72yMale s/p Robot assisted RLL wedge resection and completion lobectomy  POD# 4. Pt went into acute shortness of breath / exacerbation of Asthma -COPD requiring bronchodilators & IV Steroids.    Pt feels much better but continues to have persistent non-productive cough. Pt also had short bursts of A-fib for which he received Amio bolus  / Mg / Cardizem                         Neuro:                                         Pain control with Tylenol / Oxycodone    D/C PCA                            Cardiovascular:                                          Continue hemodynamic monitoring.    HLD: Continue Lipitor    Paroxysmal A-Fib / Tachycardia - Started on Cardizem 30mg q8hrs. Increase as needed                            Respiratory:                                         Pt is on NC                                           slightly short of breath but feels better   - got better with bronchodilators / IV STEROIDS / chest PT / Lasix     Taper Solu-Medrol                                          Using incentive spirometry                                          Pneumothorax - Small right apical pneumo. Check CXR in AM                                                                  Asthma / copd: Continue bronchodilators, IV Steroids &  pulmonary toilet                            GI                                         Constipation: Continue Colace + Senna /  Miralax      On DASH diet                                         Continue GI prophylaxis with Protonix                                         Continue Zofran / Reglan for nausea - PRN	                                                                 Renal:                                         d/cd IVF                                         Monitor I/Os and electrolytes                                         Bell for urinary retention / BPH.  D/Cd Bell      BPH: Continue Flomax                                                 Hem/ Onc:                                                                                  Monitor chest tube output &  signs of bleeding.                                          Follow CBC in AM                           Infectious disease:                                            No signs of infection. Monitor for fever / leukocytosis.                                          All surgical incision / chest tube  sites look clean                            Endocrine                                            Hyperglycemia: Probably secondary to IV Steroids. Change diet to DASH / Diabetic. Continue Accu-Checks with coverage    Pt is on SQ Heparin and Venodyne boots for DVT prophylaxis.     Pertinent clinical, laboratory, radiographic, hemodynamic, echocardiographic, respiratory data, microbiologic data and chart were reviewed and analyzed frequently throughout the course of the day and night  Patient seen, examined and plan discussed with CT Surgeon Dr. Maier / CTICU team during rounds.            Nayan Samuels MD

## 2018-03-05 ENCOUNTER — TRANSCRIPTION ENCOUNTER (OUTPATIENT)
Age: 73
End: 2018-03-05

## 2018-03-05 LAB
BUN SERPL-MCNC: 24 MG/DL — HIGH (ref 7–23)
CA-I BLD-SCNC: 1.09 MMOL/L — SIGNIFICANT CHANGE UP (ref 1.03–1.23)
CALCIUM SERPL-MCNC: 8.2 MG/DL — LOW (ref 8.4–10.5)
CHLORIDE SERPL-SCNC: 103 MMOL/L — SIGNIFICANT CHANGE UP (ref 98–107)
CO2 SERPL-SCNC: 24 MMOL/L — SIGNIFICANT CHANGE UP (ref 22–31)
CREAT SERPL-MCNC: 0.84 MG/DL — SIGNIFICANT CHANGE UP (ref 0.5–1.3)
GLUCOSE SERPL-MCNC: 161 MG/DL — HIGH (ref 70–99)
HCT VFR BLD CALC: 39.2 % — SIGNIFICANT CHANGE UP (ref 39–50)
HGB BLD-MCNC: 13.1 G/DL — SIGNIFICANT CHANGE UP (ref 13–17)
MAGNESIUM SERPL-MCNC: 2.3 MG/DL — SIGNIFICANT CHANGE UP (ref 1.6–2.6)
MCHC RBC-ENTMCNC: 29.9 PG — SIGNIFICANT CHANGE UP (ref 27–34)
MCHC RBC-ENTMCNC: 33.4 % — SIGNIFICANT CHANGE UP (ref 32–36)
MCV RBC AUTO: 89.5 FL — SIGNIFICANT CHANGE UP (ref 80–100)
NRBC # FLD: 0 — SIGNIFICANT CHANGE UP
PHOSPHATE SERPL-MCNC: 3.2 MG/DL — SIGNIFICANT CHANGE UP (ref 2.5–4.5)
PLATELET # BLD AUTO: 195 K/UL — SIGNIFICANT CHANGE UP (ref 150–400)
PMV BLD: 10.7 FL — SIGNIFICANT CHANGE UP (ref 7–13)
POTASSIUM SERPL-MCNC: 3.8 MMOL/L — SIGNIFICANT CHANGE UP (ref 3.5–5.3)
POTASSIUM SERPL-SCNC: 3.8 MMOL/L — SIGNIFICANT CHANGE UP (ref 3.5–5.3)
RBC # BLD: 4.38 M/UL — SIGNIFICANT CHANGE UP (ref 4.2–5.8)
RBC # FLD: 13.2 % — SIGNIFICANT CHANGE UP (ref 10.3–14.5)
SODIUM SERPL-SCNC: 142 MMOL/L — SIGNIFICANT CHANGE UP (ref 135–145)
WBC # BLD: 9.81 K/UL — SIGNIFICANT CHANGE UP (ref 3.8–10.5)
WBC # FLD AUTO: 9.81 K/UL — SIGNIFICANT CHANGE UP (ref 3.8–10.5)

## 2018-03-05 PROCEDURE — 71045 X-RAY EXAM CHEST 1 VIEW: CPT | Mod: 26

## 2018-03-05 RX ADMIN — Medication 500 MICROGRAM(S): at 04:44

## 2018-03-05 RX ADMIN — SENNA PLUS 2 TABLET(S): 8.6 TABLET ORAL at 21:32

## 2018-03-05 RX ADMIN — Medication 1: at 21:32

## 2018-03-05 RX ADMIN — Medication 20 MILLIGRAM(S): at 11:52

## 2018-03-05 RX ADMIN — ATORVASTATIN CALCIUM 10 MILLIGRAM(S): 80 TABLET, FILM COATED ORAL at 21:32

## 2018-03-05 RX ADMIN — Medication 20 MILLIGRAM(S): at 05:57

## 2018-03-05 RX ADMIN — Medication 100 MILLIGRAM(S): at 13:47

## 2018-03-05 RX ADMIN — BUDESONIDE AND FORMOTEROL FUMARATE DIHYDRATE 2 PUFF(S): 160; 4.5 AEROSOL RESPIRATORY (INHALATION) at 08:02

## 2018-03-05 RX ADMIN — HEPARIN SODIUM 5000 UNIT(S): 5000 INJECTION INTRAVENOUS; SUBCUTANEOUS at 21:32

## 2018-03-05 RX ADMIN — BUDESONIDE AND FORMOTEROL FUMARATE DIHYDRATE 2 PUFF(S): 160; 4.5 AEROSOL RESPIRATORY (INHALATION) at 21:33

## 2018-03-05 RX ADMIN — TAMSULOSIN HYDROCHLORIDE 0.4 MILLIGRAM(S): 0.4 CAPSULE ORAL at 21:32

## 2018-03-05 RX ADMIN — Medication 2: at 11:53

## 2018-03-05 RX ADMIN — OXYCODONE HYDROCHLORIDE 10 MILLIGRAM(S): 5 TABLET ORAL at 06:35

## 2018-03-05 RX ADMIN — HEPARIN SODIUM 5000 UNIT(S): 5000 INJECTION INTRAVENOUS; SUBCUTANEOUS at 05:57

## 2018-03-05 RX ADMIN — OXYCODONE HYDROCHLORIDE 10 MILLIGRAM(S): 5 TABLET ORAL at 11:52

## 2018-03-05 RX ADMIN — OXYCODONE HYDROCHLORIDE 10 MILLIGRAM(S): 5 TABLET ORAL at 12:35

## 2018-03-05 RX ADMIN — HEPARIN SODIUM 5000 UNIT(S): 5000 INJECTION INTRAVENOUS; SUBCUTANEOUS at 13:47

## 2018-03-05 RX ADMIN — Medication 100 MILLIGRAM(S): at 05:56

## 2018-03-05 RX ADMIN — Medication 500 MICROGRAM(S): at 16:36

## 2018-03-05 RX ADMIN — Medication 500 MICROGRAM(S): at 20:55

## 2018-03-05 RX ADMIN — Medication 3: at 17:24

## 2018-03-05 RX ADMIN — Medication 500 MICROGRAM(S): at 09:51

## 2018-03-05 RX ADMIN — PANTOPRAZOLE SODIUM 40 MILLIGRAM(S): 20 TABLET, DELAYED RELEASE ORAL at 05:56

## 2018-03-05 RX ADMIN — Medication 100 MILLIGRAM(S): at 21:32

## 2018-03-05 RX ADMIN — LORATADINE 10 MILLIGRAM(S): 10 TABLET ORAL at 11:53

## 2018-03-05 RX ADMIN — OXYCODONE HYDROCHLORIDE 5 MILLIGRAM(S): 5 TABLET ORAL at 23:08

## 2018-03-05 RX ADMIN — OXYCODONE HYDROCHLORIDE 10 MILLIGRAM(S): 5 TABLET ORAL at 05:58

## 2018-03-05 NOTE — DISCHARGE NOTE ADULT - MEDICATION SUMMARY - MEDICATIONS TO TAKE
I will START or STAY ON the medications listed below when I get home from the hospital:    multivitamin  -- 1 tab(s) by mouth once a day,   -- Indication: For vitamin    Omega 3 acid  -- 1  by mouth 2 times a day,   -- Indication: For vitamin    Vitamin D 3  -- 1000 unit(s) by mouth once a day,   -- Indication: For vitamin    predniSONE 10 mg oral tablet  -- 5 tab(s) by mouth once a day just for today. Then take 4 tabs 2x per day on 3/13. Follow taper on paperwork  -- It is very important that you take or use this exactly as directed.  Do not skip doses or discontinue unless directed by your doctor.  Obtain medical advice before taking any non-prescription drugs as some may affect the action of this medication.  Take with food or milk.    -- Indication: For Follow decreasing dose of the steroids as instructed below: Tonight take 5 tablets of the prednisone x 1 dose. Then on 3/13 and 3/14 take 4 tablets 2x per day. Then on 3/15 and 3/16 take 3 tablets 2x per day.Then on 3/17 and 3/18 take 2 tablets 2 x per day. Then on 3/19, 3/20 and 3/21 take 1 tab 2 x per day. Then on 3/22, 3/23 and 3/24 take 1 tablet just 1 x per day then stop medication all together.     acetaminophen 325 mg oral tablet  -- 2 tab(s) by mouth every 6 hours, As needed, Mild Pain (1 - 3)  -- Indication: For pain    oxyCODONE 5 mg oral tablet  -- 2 tab(s) by mouth every 4 hours, As Needed for severe pain. Can take 1 tab for moderate. MDD:12  -- Indication: For pain    DilTIAZem Hydrochloride  mg/24 hours oral capsule, extended release  -- 1 cap(s) by mouth once a day  -- Indication: For Heart rate control    loratadine 10 mg oral tablet  -- 1 tab(s) by mouth once a day, pm  -- Indication: For allergies    atorvastatin 10 mg oral tablet  -- 1 tab(s) by mouth once a day, pm  -- Indication: For cholesterol    Advair Diskus 250 mcg-50 mcg inhalation powder  -- 1 puff(s) inhaled 2 times a day  -- Indication: For breathing    docusate sodium 100 mg oral capsule  -- 1 cap(s) by mouth 3 times a day -for constipation   -- Indication: For constipation    polyethylene glycol 3350 oral powder for reconstitution  -- 17 gram(s) by mouth once a day, As needed, Constipation  -- Indication: For constipation    senna oral tablet  -- 2 tab(s) by mouth once a day (at bedtime), As Needed -for constipation   -- Indication: For constipation    pantoprazole 40 mg oral delayed release tablet  -- 1 tab(s) by mouth once a day (before a meal)  -- Indication: For Stomach protection while on steriods.     levoFLOXacin 750 mg oral tablet  -- 1 tab(s) by mouth every 24 hours  -- Indication: For Antibiotics x 1 week.     fluticasone 50 mcg inhalation powder  -- 1 puff(s) inhaled 2 times a day  -- Indication: For breathing I will START or STAY ON the medications listed below when I get home from the hospital:    multivitamin  -- 1 tab(s) by mouth once a day,   -- Indication: For vitamin    Omega 3 acid  -- 1  by mouth 2 times a day,   -- Indication: For vitamin    Vitamin D 3  -- 1000 unit(s) by mouth once a day,   -- Indication: For vitamin    predniSONE 10 mg oral tablet  -- 2.5 tab(s) by mouth 2 times a day x 2 days. Then 2 tabs 2x per day. Then complete instructed taper  -- It is very important that you take or use this exactly as directed.  Do not skip doses or discontinue unless directed by your doctor.  Obtain medical advice before taking any non-prescription drugs as some may affect the action of this medication.  Take with food or milk.    -- Indication: For Steroid prescription    predniSONE 10 mg oral tablet  -- Follow this instructed taper: 2.5 tab(s) by mouth 2 times a day for 3/13 and 3/14. Then take 2 tabs 2 x per day for 3/15 and 3/16. Then take 1.5 tabs 2 x per day for 3/17 and 3/18. Then take 1 tab 2 x per day for 3/19 and 3/20. Then take 1 tab 1x  per day for 3/21 and 3/22.   -- Indication: For Instructions for steriod taper while at home.     acetaminophen 325 mg oral tablet  -- 2 tab(s) by mouth every 6 hours, As needed, Mild Pain (1 - 3)  -- Indication: For pain    oxyCODONE 5 mg oral tablet  -- 2 tab(s) by mouth every 4 hours, As Needed for severe pain. Can take 1 tab for moderate. MDD:12  -- Indication: For pain    DilTIAZem Hydrochloride  mg/24 hours oral capsule, extended release  -- 1 cap(s) by mouth once a day  -- Indication: For Heart rate control    loratadine 10 mg oral tablet  -- 1 tab(s) by mouth once a day, pm  -- Indication: For allergies    atorvastatin 10 mg oral tablet  -- 1 tab(s) by mouth once a day, pm  -- Indication: For cholesterol    Advair Diskus 250 mcg-50 mcg inhalation powder  -- 1 puff(s) inhaled 2 times a day  -- Indication: For breathing    docusate sodium 100 mg oral capsule  -- 1 cap(s) by mouth 3 times a day -for constipation   -- Indication: For constipation    polyethylene glycol 3350 oral powder for reconstitution  -- 17 gram(s) by mouth once a day, As needed, Constipation  -- Indication: For constipation    senna oral tablet  -- 2 tab(s) by mouth once a day (at bedtime), As Needed -for constipation   -- Indication: For constipation    pantoprazole 40 mg oral delayed release tablet  -- 1 tab(s) by mouth once a day (before a meal)  -- Indication: For Stomach protection while on steriods.     levoFLOXacin 750 mg oral tablet  -- 1 tab(s) by mouth every 24 hours  -- Indication: For Antibiotics x 1 week.     fluticasone 50 mcg inhalation powder  -- 1 puff(s) inhaled 2 times a day  -- Indication: For breathing

## 2018-03-05 NOTE — PHYSICAL THERAPY INITIAL EVALUATION ADULT - RANGE OF MOTION EXAMINATION, REHAB EVAL
no ROM deficits were identified/bilateral shoulder flexion not tested >90 degrees as per PT discretion

## 2018-03-05 NOTE — PHYSICAL THERAPY INITIAL EVALUATION ADULT - PERTINENT HX OF CURRENT PROBLEM, REHAB EVAL
Patient is a 72 year old female admitted to [facility] on 2/28 presents to presurgical testing with diagnosis of other specific abnormal finding of lung field scheduled for navigational bronchoscopy biopsy right video assisted thoracoscopy, robotic assisted right lower lobectomy. PMH includes: BPH, DALIA, TB.

## 2018-03-05 NOTE — DISCHARGE NOTE ADULT - CARE PROVIDER_API CALL
Aiden Maier (MD), Surgery; Thoracic Surgery  [id number] [place] [address]  [place] [address]  Phone: [phone]  Fax: [phone]    Dionicio Jackman  [address] [place] [address]  Phone: [phone]  Fax: (   )    -

## 2018-03-05 NOTE — DISCHARGE NOTE ADULT - NS AS ACTIVITY OBS
Sex allowed/No Heavy lifting/straining/Walking-Indoors allowed/Showering allowed/Walking-Outdoors allowed/Do not drive or operate machinery/Stairs allowed Showering allowed/Stairs allowed/Do not drive or operate machinery/Walking-Outdoors allowed/Walking-Indoors allowed/No Heavy lifting/straining/Sex allowed/Do not make important decisions

## 2018-03-05 NOTE — PROGRESS NOTE ADULT - SUBJECTIVE AND OBJECTIVE BOX
Subjective: Pt feels better. Breathing is improved, patient ambulating around floor. Pt was switched to oral steroids today with a taiper.      Vital Signs:  Vital Signs Last 24 Hrs  T(C): 36.4 (03-05-18 @ 07:59), Max: 36.7 (03-04-18 @ 13:31)  T(F): 97.5 (03-05-18 @ 07:59), Max: 98.1 (03-04-18 @ 13:31)  HR: 88 (03-05-18 @ 09:51) (80 - 99)  BP: 129/73 (03-05-18 @ 07:59) (119/70 - 130/75)  RR: 18 (03-05-18 @ 07:59) (18 - 18)  SpO2: 97% (03-05-18 @ 09:51) (93% - 97%) on (O2)    Telemetry/Alarms:    Relevant labs, radiology and Medications reviewed                        13.1   9.81  )-----------( 195      ( 05 Mar 2018 06:22 )             39.2     03-05    142  |  103  |  24<H>  ----------------------------<  161<H>  3.8   |  24  |  0.84    Ca    8.2<L>      05 Mar 2018 06:22  Phos  3.2     03-05  Mg     2.3     03-05        MEDICATIONS  (STANDING):  atorvastatin 10 milliGRAM(s) Oral at bedtime  benzonatate 100 milliGRAM(s) Oral three times a day  buDESOnide 160 MICROgram(s)/formoterol 4.5 MICROgram(s) Inhaler 2 Puff(s) Inhalation two times a day  dextrose 5%. 1000 milliLiter(s) (50 mL/Hr) IV Continuous <Continuous>  dextrose 50% Injectable 12.5 Gram(s) IV Push once  dextrose 50% Injectable 25 Gram(s) IV Push once  dextrose 50% Injectable 25 Gram(s) IV Push once  diltiazem    Tablet 30 milliGRAM(s) Oral every 8 hours  docusate sodium 100 milliGRAM(s) Oral three times a day  heparin  Injectable 5000 Unit(s) SubCutaneous every 8 hours  insulin lispro (HumaLOG) corrective regimen sliding scale   SubCutaneous Before meals and at bedtime  ipratropium    for Nebulization 500 MICROGram(s) Nebulizer every 6 hours  loratadine 10 milliGRAM(s) Oral daily  pantoprazole    Tablet 40 milliGRAM(s) Oral before breakfast  predniSONE   Tablet 20 milliGRAM(s) Oral daily  senna 2 Tablet(s) Oral at bedtime  tamsulosin 0.4 milliGRAM(s) Oral at bedtime    MEDICATIONS  (PRN):  acetaminophen   Tablet. 650 milliGRAM(s) Oral every 6 hours PRN Mild Pain (1 - 3)  ALBUTerol    90 MICROgram(s) HFA Inhaler 2 Puff(s) Inhalation every 4 hours PRN Shortness of Breath  benzocaine 15 mG/menthol 3.6 mG Lozenge 1 Lozenge Oral every 4 hours PRN Sore Throat  bisacodyl Suppository 10 milliGRAM(s) Rectal daily PRN Constipation  dextrose Gel 1 Dose(s) Oral once PRN Blood Glucose LESS THAN 70 milliGRAM(s)/deciliter  glucagon  Injectable 1 milliGRAM(s) IntraMuscular once PRN Glucose LESS THAN 70 milligrams/deciliter  guaiFENesin    Syrup 100 milliGRAM(s) Oral every 6 hours PRN Cough  oxyCODONE    IR 5 milliGRAM(s) Oral every 4 hours PRN Moderate Pain (4 - 6)  oxyCODONE    IR 10 milliGRAM(s) Oral every 4 hours PRN Severe Pain (7 - 10)  polyethylene glycol 3350 17 Gram(s) Oral daily PRN Constipation      Social:  Denies Smoking, Drinking illict drug use    Physical exam  General: WN/WD NAD  Neurology: Awake, nonfocal, TORRES x 4  Eyes: Scleras clear, PERRLA/ EOMI, Gross vision intact  ENT: Gross hearing intact, grossly patent pharynx, no stridor  Neck: Neck supple, trachea midline, No JVD,   Respiratory: CTA B/L, No wheezing, rales, rhonchi  CV: RRR, S1S2, no murmurs, rubs or gallops  Abdominal: Soft, NT, ND +BS,   Extremities: No edema, + peripheral pulses  Skin: No Rashes, Hematoma, Ecchymosis  Lymphatic: No Neck, axilla, groin LAD  Psych: Oriented x 3, normal affect  Incisions:   Tubes:    I&O's Summary    04 Mar 2018 07:01  -  05 Mar 2018 07:00  --------------------------------------------------------  IN: 120 mL / OUT: 1175 mL / NET: -1055 mL        Assessment  72y Male  w/ PAST MEDICAL & SURGICAL HISTORY:  Impaired glucose tolerance: on diet control  Other nonspecific abnormal finding of lung field  DALIA on CPAP  TB (tuberculosis): age 20:  Treated with meds X 1year  Fatty liver  Renal calculi: &#x27; 2005:  passed  BPH (Benign Prostatic Hyperplasia)  Asthma: mild  Elevated triglycerides with high cholesterol  Foot fracture, left: &quot; 90&#x27;s  History of prostate surgery: greenlight laser 2013  H/O left inguinal hernia repair  Status post cataract extraction: bilateral  Foot fracture, left: &#x27; 90&#x27;s:  ORIF  admitted with complaints of Patient is a 72y old  Male who presents with a chief complaint of other specific abnormal finding of lung field (23 Feb 2018 13:36)  .  On (Date), patient underwent Navigational bronchoscopy  Flexible bronchoscopy  Lobectomy, lung, robot assisted  . Postoperative course/issues:    PLAN  Neuro: Pain management  Pulm: c/w steroid taper. Encourage coughing, deep breathing and use of incentive spirometry. Wean off supplemental oxygen as able. Daily CXR.   Cardio: c/w cardizem. Monitor telemetry/alarms  GI: Tolerating diet. Continue stool softeners.  Renal: monitor urine output, supplement electrolytes as needed  Vasc: Heparin SC/SCDs for DVT prophylaxis  Heme: Stable H/H. .   ID: Off antibiotics. Stable.  Therapy: OOB/ambulate  Disposition: Aim to D/C to home tomorrow if stable  Discussed with Cardiothoracic Team at AM rounds.

## 2018-03-05 NOTE — PHYSICAL THERAPY INITIAL EVALUATION ADULT - ADDITIONAL COMMENTS
Patient reports he lives with his wife, son and daughter-in-law in a private house with 6 steps to enter, and 6 steps to main living area. Patient reports he was previously independent in all ADLs and did not use an assistive device for ambulation.     Patient was left sitting in chair as found, all lines/tubes intact and call moody within reach, MARJORIE renteria

## 2018-03-05 NOTE — DISCHARGE NOTE ADULT - INSTRUCTIONS
Resume usual diet Keep surgical incision clean and dry. Report to Emergency Department for any signs of infection, fever, chills or shortness of breath

## 2018-03-05 NOTE — DISCHARGE NOTE ADULT - ADDITIONAL INSTRUCTIONS
Follow up with Dr Maier in about a week; Call for an appointment; Bring a new Chest X-ray when you come to see him.  Watch for pus, redness, severe pain, fever, or other unusual signs and if noted, call Dr Maier's office. Follow up with Dr Maier in about a week; Call for an appointment; Bring a new Chest X-ray when you come to see him.  Watch for pus, redness, severe pain, fever, or other unusual signs and if noted, call Dr Maier's office. Take your medications as prescribed Follow up with Dr Maier in about a week; Call for an appointment; Bring a new Chest X-ray when you come to see him.  Watch for pus, redness, severe pain, fever, or other unusual signs and if noted, call Dr Maier's office. Take your medications as prescribed. Please follow up with your PCP within 1-2 weeks

## 2018-03-05 NOTE — DISCHARGE NOTE ADULT - PLAN OF CARE
Wound healing; Plan development after final pathology results are gotten s/p RLL Lobectomy- f/u with Dr Maier in about a week. Wound healing; Plan development after final pathology results are gotten; Finish antibiotic course & steroid taper s/p RLL Lobectomy- f/u with Dr Maier in about a week; finish antibiotic course and steroid taper Please walk 4-5 x per day, Increase as tolerated. You may climb stairs. Continue to use incentive spirometer. Continue all medications unless otherwise told by your doctor.  You may keep all wounds uncovered. Please shower daily. The suture will be removed in office at follow up apt.   See Dr. Maier office in 1-2 weeks. Please call [phone] for an apt. Please get an CXR the day before your appointment and bring it with you to your follow up appointment. Please follow up with your PCP within one week.  Please call the office at [phone] if you have fever's, chills, worsening shortness of breath, chest pain, warmth, redness or purulent discharge from the insertion site.

## 2018-03-05 NOTE — DISCHARGE NOTE ADULT - CARE PLAN
Principal Discharge DX:	Other nonspecific abnormal finding of lung field  Goal:	Wound healing; Plan development after final pathology results are gotten  Assessment and plan of treatment:	s/p RLL Lobectomy- f/u with Dr Maier in about a week. Principal Discharge DX:	Other nonspecific abnormal finding of lung field  Goal:	Wound healing; Plan development after final pathology results are gotten; Finish antibiotic course & steroid taper  Assessment and plan of treatment:	s/p RLL Lobectomy- f/u with Dr Maier in about a week; finish antibiotic course and steroid taper Principal Discharge DX:	Other nonspecific abnormal finding of lung field  Goal:	Wound healing; Plan development after final pathology results are gotten; Finish antibiotic course & steroid taper  Assessment and plan of treatment:	Please walk 4-5 x per day, Increase as tolerated. You may climb stairs. Continue to use incentive spirometer. Continue all medications unless otherwise told by your doctor.  You may keep all wounds uncovered. Please shower daily. The suture will be removed in office at follow up apt.   See Dr. Maier office in 1-2 weeks. Please call [phone] for an apt. Please get an CXR the day before your appointment and bring it with you to your follow up appointment. Please follow up with your PCP within one week.  Please call the office at [phone] if you have fever's, chills, worsening shortness of breath, chest pain, warmth, redness or purulent discharge from the insertion site.

## 2018-03-05 NOTE — DISCHARGE NOTE ADULT - HOSPITAL COURSE
72 year old male underwent a navigational bronchoscopy, biopsy, right VATS, robotic-assisted right lower lobectomy on 2/28/18.  Pt reports having had a fever and cough in 2/2018 and having been treated with antibiotics.  His symptoms have now resolved.  However, he had had an abnormal CXR and then a follow up CT chest revealed a right lower lung mass.  That was the reason for his operation.  Post-op, he experienced bronchospasms and was treated with IV steroids.  He also had atrial fibrillation for which he was treated with Cardizem. His chest tube was removed and he was started on a PO steroid taper prior to discharge home. 72 year old male underwent a navigational bronchoscopy, biopsy, right VATS, robotic-assisted right lower lobectomy on 2/28/18.  Pt reports having had a fever and cough in 2/2018 and having been treated with antibiotics.  His symptoms have now resolved.  However, he had had an abnormal CXR and then a follow up CT chest revealed a right lower lung mass.  That was the reason for his operation.  Immediately post-op, he experienced episodes of bronchospasms and was treated with IV steroids.  He was stable for transfer to  but had to return to [facility] when his bronchospasms and subcutaneous emphysema worsened and he was treated for pneumonia with IV antibiotics.  He also had atrial fibrillation for which he was treated with Cardizem. He was started on a PO steroid taper prior to discharge home on PO antibiotics. 72 year old male underwent a navigational bronchoscopy, biopsy, right VATS, robotic-assisted right lower lobectomy on 2/28/18.  Pt reports having had a fever and cough in 2/2018 and having been treated with antibiotics.  His symptoms have now resolved.  However, he had had an abnormal CXR and then a follow up CT chest revealed a right lower lung mass.  That was the reason for his operation. Immediately post-op, he experienced episodes of bronchospasms and was treated with IV steroids.  He was stable for transfer to , where his chest tube output had decreased and it was d/c'ed, f/u cxr stable. Pt had to return to [facility] when his bronchospasms and subcutaneous emphysema worsened and he was treated for pneumonia with IV antibiotics.  He also had atrial fibrillation for which he was treated with Cardizem. He was stabilized and transferred back up to . He started on a PO steroid taper prior to discharge home on PO antibiotics.  On 3/12, patient feels well, no recent fevers/chills, cough improving, and his CXR had improved. Patient was switched to PO antioboitcs based off his C/S from his sputum culture. Patient was cleared for discharge by Dr. Maier. 72 year old male underwent a navigational bronchoscopy, biopsy, right VATS, robotic-assisted right lower lobectomy on 2/28/18.  Pt reports having had a fever and cough in 2/2018 and having been treated with antibiotics.  His symptoms have now resolved.  However, he had had an abnormal CXR and then a follow up CT chest revealed a right lower lung mass.  That was the reason for his operation. Immediately post-op, he experienced episodes of bronchospasms and was treated with IV steroids.  He was stable for transfer to , where his chest tube output had decreased and it was d/c'ed, f/u cxr stable. Pt had to return to [facility] when his bronchospasms and subcutaneous emphysema worsened and he was treated for pneumonia with IV antibiotics.  He also had atrial fibrillation for which he was treated with Cardizem. He was stabilized and transferred back up to . He started on a PO steroid taper prior to discharge home on PO antibiotics.  On 3/12, patient feels well, no recent fevers/chills, cough improving, and his CXR had improved. Patient was switched to PO antioboitcs based off his C/S from his sputum culture. Patient was cleared for discharge by Dr. Maier.  Vital Signs Last 24 Hrs  T(C): 36.7 (12 Mar 2018 08:14), Max: 36.7 (11 Mar 2018 11:19)  T(F): 98 (12 Mar 2018 08:14), Max: 98.1 (11 Mar 2018 11:19)  HR: 78 (12 Mar 2018 09:16) (76 - 102)  BP: 133/83 (12 Mar 2018 08:14) (115/68 - 146/83)  BP(mean): --  RR: 16 (12 Mar 2018 08:14) (16 - 20)  SpO2: 97% (12 Mar 2018 09:16) (86% - 97%) 72 year old male underwent a navigational bronchoscopy, biopsy, right VATS, robotic-assisted right lower lobectomy on 2/28/18.  Pt reports having had a fever and cough in 2/2018 and having been treated with antibiotics.  His symptoms have now resolved.  However, he had had an abnormal CXR and then a follow up CT chest revealed a right lower lung mass.  That was the reason for his operation. Immediately post-op, he experienced episodes of bronchospasms and was treated with IV steroids.  He was stable for transfer to , where his chest tube output had decreased and it was d/c'ed, f/u cxr stable. Pt had to return to [facility] when his bronchospasms and subcutaneous emphysema worsened and he was treated for pneumonia with IV antibiotics.  He also had atrial fibrillation for which he was treated with Cardizem. He was stabilized and transferred back up to . He started on a PO steroid taper prior to discharge home on PO antibiotics.  On 3/12, patient feels well, no recent fevers/chills, cough improving, and his CXR had improved. Patient was switched to PO antioboitcs based off his C/S from his sputum culture. Pt. still w some SOB w exertion today. Walked multiple times. O2 sat on RA with walking 90%. Will not need home O2. Patient was cleared for discharge by Dr. Maier.  Vital Signs Last 24 Hrs  T(C): 36.7 (12 Mar 2018 08:14), Max: 36.7 (11 Mar 2018 11:19)  T(F): 98 (12 Mar 2018 08:14), Max: 98.1 (11 Mar 2018 11:19)  HR: 78 (12 Mar 2018 09:16) (76 - 102)  BP: 133/83 (12 Mar 2018 08:14) (115/68 - 146/83)  BP(mean): --  RR: 16 (12 Mar 2018 08:14) (16 - 20)  SpO2: 97% (12 Mar 2018 09:16) (86% - 97%)

## 2018-03-05 NOTE — DISCHARGE NOTE ADULT - PROVIDER TOKENS
TOKEN:'[id number]',FREE:[LAST:[Jackman],FIRST:[Min],PHONE:[phone]],FAX:[(   [phone]    -],ADDRESS:[[address] [place] [phone]]]

## 2018-03-05 NOTE — DISCHARGE NOTE ADULT - PATIENT PORTAL LINK FT
You can access the [facility] Patient Portal, offered by [facility], by registering with the following website: [email or web address]

## 2018-03-05 NOTE — PHYSICAL THERAPY INITIAL EVALUATION ADULT - PATIENT PROFILE REVIEW, REHAB EVAL
PT orders received-->ambulate as tolerated. Consult with MARJORIE EDUARDO-->pt OK to participate in PT evaluation./yes

## 2018-03-06 PROCEDURE — 71275 CT ANGIOGRAPHY CHEST: CPT | Mod: 26

## 2018-03-06 RX ORDER — DIPHENHYDRAMINE HCL 50 MG
50 CAPSULE ORAL ONCE
Qty: 0 | Refills: 0 | Status: COMPLETED | OUTPATIENT
Start: 2018-03-06 | End: 2018-03-06

## 2018-03-06 RX ORDER — EPINEPHRINE 11.25MG/ML
3 SOLUTION, NON-ORAL INHALATION ONCE
Qty: 0 | Refills: 0 | Status: COMPLETED | OUTPATIENT
Start: 2018-03-06 | End: 2018-03-06

## 2018-03-06 RX ORDER — LEVALBUTEROL 1.25 MG/.5ML
0.63 SOLUTION, CONCENTRATE RESPIRATORY (INHALATION) EVERY 6 HOURS
Qty: 0 | Refills: 0 | Status: DISCONTINUED | OUTPATIENT
Start: 2018-03-06 | End: 2018-03-12

## 2018-03-06 RX ORDER — ACETAMINOPHEN 500 MG
1000 TABLET ORAL ONCE
Qty: 0 | Refills: 0 | Status: COMPLETED | OUTPATIENT
Start: 2018-03-06 | End: 2018-03-06

## 2018-03-06 RX ORDER — EPINEPHRINE 11.25MG/ML
0.5 SOLUTION, NON-ORAL INHALATION ONCE
Qty: 0 | Refills: 0 | Status: COMPLETED | OUTPATIENT
Start: 2018-03-06 | End: 2018-03-06

## 2018-03-06 RX ORDER — EPINEPHRINE 11.25MG/ML
3 SOLUTION, NON-ORAL INHALATION ONCE
Qty: 0 | Refills: 0 | Status: DISCONTINUED | OUTPATIENT
Start: 2018-03-06 | End: 2018-03-06

## 2018-03-06 RX ADMIN — ATORVASTATIN CALCIUM 10 MILLIGRAM(S): 80 TABLET, FILM COATED ORAL at 21:49

## 2018-03-06 RX ADMIN — Medication 0.5 MILLILITER(S): at 15:05

## 2018-03-06 RX ADMIN — SENNA PLUS 2 TABLET(S): 8.6 TABLET ORAL at 21:49

## 2018-03-06 RX ADMIN — Medication 400 MILLIGRAM(S): at 21:49

## 2018-03-06 RX ADMIN — Medication 20 MILLIGRAM(S): at 11:38

## 2018-03-06 RX ADMIN — Medication 100 MILLIGRAM(S): at 21:49

## 2018-03-06 RX ADMIN — PANTOPRAZOLE SODIUM 40 MILLIGRAM(S): 20 TABLET, DELAYED RELEASE ORAL at 05:00

## 2018-03-06 RX ADMIN — Medication 100 MILLIGRAM(S): at 05:00

## 2018-03-06 RX ADMIN — Medication 20 MILLIGRAM(S): at 19:40

## 2018-03-06 RX ADMIN — OXYCODONE HYDROCHLORIDE 10 MILLIGRAM(S): 5 TABLET ORAL at 19:40

## 2018-03-06 RX ADMIN — HEPARIN SODIUM 5000 UNIT(S): 5000 INJECTION INTRAVENOUS; SUBCUTANEOUS at 21:49

## 2018-03-06 RX ADMIN — OXYCODONE HYDROCHLORIDE 10 MILLIGRAM(S): 5 TABLET ORAL at 20:30

## 2018-03-06 RX ADMIN — Medication 2: at 18:37

## 2018-03-06 RX ADMIN — OXYCODONE HYDROCHLORIDE 10 MILLIGRAM(S): 5 TABLET ORAL at 00:53

## 2018-03-06 RX ADMIN — Medication 1: at 12:11

## 2018-03-06 RX ADMIN — HEPARIN SODIUM 5000 UNIT(S): 5000 INJECTION INTRAVENOUS; SUBCUTANEOUS at 13:03

## 2018-03-06 RX ADMIN — HEPARIN SODIUM 5000 UNIT(S): 5000 INJECTION INTRAVENOUS; SUBCUTANEOUS at 05:00

## 2018-03-06 RX ADMIN — Medication 3 MILLILITER(S): at 10:24

## 2018-03-06 RX ADMIN — OXYCODONE HYDROCHLORIDE 10 MILLIGRAM(S): 5 TABLET ORAL at 01:45

## 2018-03-06 RX ADMIN — Medication 500 MICROGRAM(S): at 09:36

## 2018-03-06 RX ADMIN — Medication 100 MILLIGRAM(S): at 13:03

## 2018-03-06 RX ADMIN — OXYCODONE HYDROCHLORIDE 10 MILLIGRAM(S): 5 TABLET ORAL at 09:56

## 2018-03-06 RX ADMIN — Medication 20 MILLIGRAM(S): at 05:00

## 2018-03-06 RX ADMIN — OXYCODONE HYDROCHLORIDE 5 MILLIGRAM(S): 5 TABLET ORAL at 00:27

## 2018-03-06 RX ADMIN — BUDESONIDE AND FORMOTEROL FUMARATE DIHYDRATE 2 PUFF(S): 160; 4.5 AEROSOL RESPIRATORY (INHALATION) at 09:56

## 2018-03-06 RX ADMIN — TAMSULOSIN HYDROCHLORIDE 0.4 MILLIGRAM(S): 0.4 CAPSULE ORAL at 21:49

## 2018-03-06 RX ADMIN — BUDESONIDE AND FORMOTEROL FUMARATE DIHYDRATE 2 PUFF(S): 160; 4.5 AEROSOL RESPIRATORY (INHALATION) at 21:50

## 2018-03-06 RX ADMIN — Medication 1000 MILLIGRAM(S): at 22:05

## 2018-03-06 RX ADMIN — Medication 500 MICROGRAM(S): at 04:34

## 2018-03-06 RX ADMIN — LEVALBUTEROL 0.63 MILLIGRAM(S): 1.25 SOLUTION, CONCENTRATE RESPIRATORY (INHALATION) at 16:20

## 2018-03-06 RX ADMIN — LORATADINE 10 MILLIGRAM(S): 10 TABLET ORAL at 11:38

## 2018-03-06 RX ADMIN — Medication 50 MILLIGRAM(S): at 17:19

## 2018-03-06 RX ADMIN — LEVALBUTEROL 0.63 MILLIGRAM(S): 1.25 SOLUTION, CONCENTRATE RESPIRATORY (INHALATION) at 10:24

## 2018-03-06 RX ADMIN — OXYCODONE HYDROCHLORIDE 10 MILLIGRAM(S): 5 TABLET ORAL at 10:00

## 2018-03-06 NOTE — PROGRESS NOTE ADULT - SUBJECTIVE AND OBJECTIVE BOX
Subjective: pt w coughing fits today requiring stat racemic epi and xopenex; paroxysmal atrial fibrillation exacerbated by coughing    Vital Signs:  Vital Signs Last 24 Hrs  T(C): 36.2 (03-06-18 @ 10:51), Max: 36.9 (03-06-18 @ 01:50)  T(F): 97.2 (03-06-18 @ 10:51), Max: 98.4 (03-06-18 @ 01:50)  HR: 88 (03-06-18 @ 16:22) (81 - 101)  BP: 120/68 (03-06-18 @ 10:51) (120/68 - 155/80)  RR: 18 (03-06-18 @ 16:22) (18 - 18)  SpO2: 98% (03-06-18 @ 16:22) (90% - 98%) on (O2)    Telemetry/Alarms:  General: WN/WD NAD  Neurology: Awake, nonfocal, TORRES x 4  Eyes: Scleras clear, PERRLA/ EOMI, Gross vision intact  ENT:Gross hearing intact, grossly patent pharynx, no stridor  Neck: Neck supple, trachea midline, No JVD,   Respiratory: CTA B/L, No wheezing, rales, rhonchi  CV: RRR, S1S2, no murmurs, rubs or gallops  Abdominal: Soft, NT, ND +BS,   Extremities: No edema, + peripheral pulses  Skin: No Rashes, Hematoma, Ecchymosis  Lymphatic: No Neck, axilla, groin LAD  Psych: Oriented x 3, normal affect  Incisions: c,d,i    Relevant labs, radiology and Medications reviewed                        13.1   9.81  )-----------( 195      ( 05 Mar 2018 06:22 )             39.2     03-05    142  |  103  |  24<H>  ----------------------------<  161<H>  3.8   |  24  |  0.84    Ca    8.2<L>      05 Mar 2018 06:22  Phos  3.2     03-05  Mg     2.3     03-05        MEDICATIONS  (STANDING):  atorvastatin 10 milliGRAM(s) Oral at bedtime  benzonatate 100 milliGRAM(s) Oral three times a day  buDESOnide 160 MICROgram(s)/formoterol 4.5 MICROgram(s) Inhaler 2 Puff(s) Inhalation two times a day  dextrose 5%. 1000 milliLiter(s) (50 mL/Hr) IV Continuous <Continuous>  dextrose 50% Injectable 12.5 Gram(s) IV Push once  dextrose 50% Injectable 25 Gram(s) IV Push once  dextrose 50% Injectable 25 Gram(s) IV Push once  diltiazem    Tablet 30 milliGRAM(s) Oral every 8 hours  diphenhydrAMINE   Capsule 50 milliGRAM(s) Oral once  docusate sodium 100 milliGRAM(s) Oral three times a day  heparin  Injectable 5000 Unit(s) SubCutaneous every 8 hours  insulin lispro (HumaLOG) corrective regimen sliding scale   SubCutaneous Before meals and at bedtime  levalbuterol Inhalation 0.63 milliGRAM(s) Inhalation every 6 hours  loratadine 10 milliGRAM(s) Oral daily  methylPREDNISolone sodium succinate Injectable 20 milliGRAM(s) IV Push every 8 hours  pantoprazole    Tablet 40 milliGRAM(s) Oral before breakfast  senna 2 Tablet(s) Oral at bedtime  tamsulosin 0.4 milliGRAM(s) Oral at bedtime    MEDICATIONS  (PRN):  acetaminophen   Tablet. 650 milliGRAM(s) Oral every 6 hours PRN Mild Pain (1 - 3)  ALBUTerol    90 MICROgram(s) HFA Inhaler 2 Puff(s) Inhalation every 4 hours PRN Shortness of Breath  benzocaine 15 mG/menthol 3.6 mG Lozenge 1 Lozenge Oral every 4 hours PRN Sore Throat  bisacodyl Suppository 10 milliGRAM(s) Rectal daily PRN Constipation  dextrose Gel 1 Dose(s) Oral once PRN Blood Glucose LESS THAN 70 milliGRAM(s)/deciliter  glucagon  Injectable 1 milliGRAM(s) IntraMuscular once PRN Glucose LESS THAN 70 milligrams/deciliter  guaiFENesin    Syrup 100 milliGRAM(s) Oral every 6 hours PRN Cough  oxyCODONE    IR 5 milliGRAM(s) Oral every 4 hours PRN Moderate Pain (4 - 6)  oxyCODONE    IR 10 milliGRAM(s) Oral every 4 hours PRN Severe Pain (7 - 10)  polyethylene glycol 3350 17 Gram(s) Oral daily PRN Constipation    Pertinent Physical Exam  I&O's Summary    05 Mar 2018 07:01  -  06 Mar 2018 07:00  --------------------------------------------------------  IN: 50 mL / OUT: 2250 mL / NET: -2200 mL    06 Mar 2018 07:01  -  06 Mar 2018 16:52  --------------------------------------------------------  IN: 250 mL / OUT: 300 mL / NET: -50 mL        Assessment  72y Male  w/ PAST MEDICAL & SURGICAL HISTORY:  Impaired glucose tolerance: on diet control  Other nonspecific abnormal finding of lung field  DALIA on CPAP  TB (tuberculosis): age 20:  Treated with meds X 1year  Fatty liver  Renal calculi: &#x27; 2005:  passed  BPH (Benign Prostatic Hyperplasia)  Asthma: mild  Elevated triglycerides with high cholesterol  Foot fracture, left: &quot; 90&#x27;s  History of prostate surgery: greenlight laser 2013  H/O left inguinal hernia repair  Status post cataract extraction: bilateral  Foot fracture, left: &#x27; 90&#x27;s:  ORIF  admitted with complaints of Patient is a 72y old  Male who presents with a chief complaint of other specific abnormal finding of lung field (05 Mar 2018 19:53)  .  On (2/28/18), patient underwent Navigational bronchoscopy  Flexible bronchoscopy  Lobectomy, lung, robot assisted  . Postoperative course/issues: coughing fits, bronchospasm on racemic epi, xopenex, pulmicort and IV steroid    PLAN  Neuro: Pain management  Pulm: Encourage coughing, deep breathing and use of incentive spirometry. Wean off supplemental oxygen as able. Daily CXR.   Pt ordered for CTA to r/o PE and to evaluate lungs  Cardio: Monitor telemetry/alarms  GI: Tolerating diet. Continue stool softeners.  Renal: monitor urine output, supplement electrolytes as needed  Vasc: Heparin SC/SCDs for DVT prophylaxis  Heme: Stable H/H. .   ID: Off antibiotics. Stable.  Therapy: OOB/ambulate    Disposition: Aim to D/C to home once symptoms improve  Discussed with Cardiothoracic Team at AM rounds.

## 2018-03-07 LAB
BUN SERPL-MCNC: 21 MG/DL — SIGNIFICANT CHANGE UP (ref 7–23)
CALCIUM SERPL-MCNC: 8.2 MG/DL — LOW (ref 8.4–10.5)
CHLORIDE SERPL-SCNC: 102 MMOL/L — SIGNIFICANT CHANGE UP (ref 98–107)
CO2 SERPL-SCNC: 24 MMOL/L — SIGNIFICANT CHANGE UP (ref 22–31)
CREAT SERPL-MCNC: 0.8 MG/DL — SIGNIFICANT CHANGE UP (ref 0.5–1.3)
GLUCOSE SERPL-MCNC: 160 MG/DL — HIGH (ref 70–99)
GRAM STN SPT: SIGNIFICANT CHANGE UP
HCT VFR BLD CALC: 42.5 % — SIGNIFICANT CHANGE UP (ref 39–50)
HGB BLD-MCNC: 14.5 G/DL — SIGNIFICANT CHANGE UP (ref 13–17)
MCHC RBC-ENTMCNC: 30.9 PG — SIGNIFICANT CHANGE UP (ref 27–34)
MCHC RBC-ENTMCNC: 34.1 % — SIGNIFICANT CHANGE UP (ref 32–36)
MCV RBC AUTO: 90.6 FL — SIGNIFICANT CHANGE UP (ref 80–100)
NRBC # FLD: 0 — SIGNIFICANT CHANGE UP
PLATELET # BLD AUTO: 219 K/UL — SIGNIFICANT CHANGE UP (ref 150–400)
PMV BLD: 10.6 FL — SIGNIFICANT CHANGE UP (ref 7–13)
POTASSIUM SERPL-MCNC: 3.7 MMOL/L — SIGNIFICANT CHANGE UP (ref 3.5–5.3)
POTASSIUM SERPL-SCNC: 3.7 MMOL/L — SIGNIFICANT CHANGE UP (ref 3.5–5.3)
RBC # BLD: 4.69 M/UL — SIGNIFICANT CHANGE UP (ref 4.2–5.8)
RBC # FLD: 13.4 % — SIGNIFICANT CHANGE UP (ref 10.3–14.5)
SODIUM SERPL-SCNC: 141 MMOL/L — SIGNIFICANT CHANGE UP (ref 135–145)
SPECIMEN SOURCE: SIGNIFICANT CHANGE UP
SPECIMEN SOURCE: SIGNIFICANT CHANGE UP
WBC # BLD: 13.48 K/UL — HIGH (ref 3.8–10.5)
WBC # FLD AUTO: 13.48 K/UL — HIGH (ref 3.8–10.5)

## 2018-03-07 PROCEDURE — 71045 X-RAY EXAM CHEST 1 VIEW: CPT | Mod: 26

## 2018-03-07 PROCEDURE — 71045 X-RAY EXAM CHEST 1 VIEW: CPT | Mod: 26,77,59

## 2018-03-07 PROCEDURE — 99291 CRITICAL CARE FIRST HOUR: CPT

## 2018-03-07 RX ORDER — EPINEPHRINE 11.25MG/ML
0.5 SOLUTION, NON-ORAL INHALATION ONCE
Qty: 0 | Refills: 0 | Status: COMPLETED | OUTPATIENT
Start: 2018-03-07 | End: 2018-03-07

## 2018-03-07 RX ORDER — FUROSEMIDE 40 MG
10 TABLET ORAL ONCE
Qty: 0 | Refills: 0 | Status: COMPLETED | OUTPATIENT
Start: 2018-03-07 | End: 2018-03-07

## 2018-03-07 RX ORDER — VANCOMYCIN HCL 1 G
1000 VIAL (EA) INTRAVENOUS EVERY 12 HOURS
Qty: 0 | Refills: 0 | Status: DISCONTINUED | OUTPATIENT
Start: 2018-03-07 | End: 2018-03-11

## 2018-03-07 RX ORDER — PIPERACILLIN AND TAZOBACTAM 4; .5 G/20ML; G/20ML
3.38 INJECTION, POWDER, LYOPHILIZED, FOR SOLUTION INTRAVENOUS ONCE
Qty: 0 | Refills: 0 | Status: COMPLETED | OUTPATIENT
Start: 2018-03-07 | End: 2018-03-07

## 2018-03-07 RX ORDER — PIPERACILLIN AND TAZOBACTAM 4; .5 G/20ML; G/20ML
3.38 INJECTION, POWDER, LYOPHILIZED, FOR SOLUTION INTRAVENOUS EVERY 8 HOURS
Qty: 0 | Refills: 0 | Status: DISCONTINUED | OUTPATIENT
Start: 2018-03-07 | End: 2018-03-12

## 2018-03-07 RX ORDER — ACETAMINOPHEN 500 MG
1000 TABLET ORAL ONCE
Qty: 0 | Refills: 0 | Status: COMPLETED | OUTPATIENT
Start: 2018-03-07 | End: 2018-03-07

## 2018-03-07 RX ADMIN — LEVALBUTEROL 0.63 MILLIGRAM(S): 1.25 SOLUTION, CONCENTRATE RESPIRATORY (INHALATION) at 11:37

## 2018-03-07 RX ADMIN — Medication 60 MILLIGRAM(S): at 17:08

## 2018-03-07 RX ADMIN — BUDESONIDE AND FORMOTEROL FUMARATE DIHYDRATE 2 PUFF(S): 160; 4.5 AEROSOL RESPIRATORY (INHALATION) at 08:52

## 2018-03-07 RX ADMIN — Medication 1: at 12:11

## 2018-03-07 RX ADMIN — Medication 650 MILLIGRAM(S): at 08:21

## 2018-03-07 RX ADMIN — PANTOPRAZOLE SODIUM 40 MILLIGRAM(S): 20 TABLET, DELAYED RELEASE ORAL at 05:36

## 2018-03-07 RX ADMIN — OXYCODONE HYDROCHLORIDE 5 MILLIGRAM(S): 5 TABLET ORAL at 19:05

## 2018-03-07 RX ADMIN — PIPERACILLIN AND TAZOBACTAM 25 GRAM(S): 4; .5 INJECTION, POWDER, LYOPHILIZED, FOR SOLUTION INTRAVENOUS at 18:36

## 2018-03-07 RX ADMIN — Medication 2: at 21:57

## 2018-03-07 RX ADMIN — HEPARIN SODIUM 5000 UNIT(S): 5000 INJECTION INTRAVENOUS; SUBCUTANEOUS at 05:36

## 2018-03-07 RX ADMIN — SENNA PLUS 2 TABLET(S): 8.6 TABLET ORAL at 21:49

## 2018-03-07 RX ADMIN — LEVALBUTEROL 0.63 MILLIGRAM(S): 1.25 SOLUTION, CONCENTRATE RESPIRATORY (INHALATION) at 17:20

## 2018-03-07 RX ADMIN — HEPARIN SODIUM 5000 UNIT(S): 5000 INJECTION INTRAVENOUS; SUBCUTANEOUS at 13:24

## 2018-03-07 RX ADMIN — Medication 60 MILLIGRAM(S): at 23:39

## 2018-03-07 RX ADMIN — Medication 20 MILLIGRAM(S): at 02:28

## 2018-03-07 RX ADMIN — Medication 10 MILLIGRAM(S): at 08:52

## 2018-03-07 RX ADMIN — BUDESONIDE AND FORMOTEROL FUMARATE DIHYDRATE 2 PUFF(S): 160; 4.5 AEROSOL RESPIRATORY (INHALATION) at 22:22

## 2018-03-07 RX ADMIN — Medication 3: at 17:07

## 2018-03-07 RX ADMIN — Medication 400 MILLIGRAM(S): at 22:30

## 2018-03-07 RX ADMIN — Medication 650 MILLIGRAM(S): at 07:51

## 2018-03-07 RX ADMIN — Medication 100 MILLIGRAM(S): at 05:36

## 2018-03-07 RX ADMIN — PIPERACILLIN AND TAZOBACTAM 200 GRAM(S): 4; .5 INJECTION, POWDER, LYOPHILIZED, FOR SOLUTION INTRAVENOUS at 11:00

## 2018-03-07 RX ADMIN — HEPARIN SODIUM 5000 UNIT(S): 5000 INJECTION INTRAVENOUS; SUBCUTANEOUS at 21:49

## 2018-03-07 RX ADMIN — BENZOCAINE AND MENTHOL 1 LOZENGE: 5; 1 LIQUID ORAL at 23:39

## 2018-03-07 RX ADMIN — Medication 60 MILLIGRAM(S): at 13:23

## 2018-03-07 RX ADMIN — Medication 250 MILLIGRAM(S): at 17:08

## 2018-03-07 RX ADMIN — ATORVASTATIN CALCIUM 10 MILLIGRAM(S): 80 TABLET, FILM COATED ORAL at 21:49

## 2018-03-07 RX ADMIN — Medication 100 MILLIGRAM(S): at 13:25

## 2018-03-07 RX ADMIN — LORATADINE 10 MILLIGRAM(S): 10 TABLET ORAL at 13:22

## 2018-03-07 RX ADMIN — LEVALBUTEROL 0.63 MILLIGRAM(S): 1.25 SOLUTION, CONCENTRATE RESPIRATORY (INHALATION) at 22:15

## 2018-03-07 RX ADMIN — Medication 100 MILLIGRAM(S): at 21:49

## 2018-03-07 RX ADMIN — OXYCODONE HYDROCHLORIDE 5 MILLIGRAM(S): 5 TABLET ORAL at 18:35

## 2018-03-07 RX ADMIN — Medication 1000 MILLIGRAM(S): at 23:00

## 2018-03-07 RX ADMIN — Medication 250 MILLIGRAM(S): at 08:52

## 2018-03-07 RX ADMIN — OXYCODONE HYDROCHLORIDE 10 MILLIGRAM(S): 5 TABLET ORAL at 05:40

## 2018-03-07 RX ADMIN — Medication 0.5 MILLILITER(S): at 08:01

## 2018-03-07 RX ADMIN — OXYCODONE HYDROCHLORIDE 10 MILLIGRAM(S): 5 TABLET ORAL at 06:20

## 2018-03-07 RX ADMIN — TAMSULOSIN HYDROCHLORIDE 0.4 MILLIGRAM(S): 0.4 CAPSULE ORAL at 21:49

## 2018-03-07 NOTE — PROGRESS NOTE ADULT - SUBJECTIVE AND OBJECTIVE BOX
72 M PMH mild Asthma  mild, BPH, triglyceridemia, fatty liver, DALIA on CPAP, renal calculi, TB w SPN  The patient had a fever & cough w an abnormal CXR in Feb 2018, treated w ABXs. Follow up CT scan revealed a right lower lung mass. Currently, the pt has no SOB and his cough has resolved.     [id number]: Navigational bronchoscopy, VATS, RLLectomy, MLND, FOB    Issues:   Pneumomediatinyum  SOB  Post op pain  Aspiration PNA?  	DALIA  	HLD  	BPH  	Asthma  	BPH    The patient developed worsening SOB, SQ air, nasal voice and a RLL infiltrate.  Pan-cultures were obtained and empiric broad spectrum ABXs were started.  The pt was transferred to CT ICU for closer observation.    Allergies:  	strawberry: Food, Confirmed, Patient, Other, Cough  	Cherries: Cough: Food, Confirmed, Patient, Other, Cherries: Cough  	shellfish: Food, Confirmed, Patient, Other, cough  	dust: Miscellaneous, Confirmed, Patient, Sneezing, Environmental Allergy    Home Medications:   * Incomplete Medication History as of 23-Feb-2018 13:42 documented in Structured Notes  · 	atorvastatin 10 mg oral tablet: Last Dose Taken:  , 1 tab(s) orally once a day, pm  · 	multivitamin: Last Dose Taken:  , 1 tab(s) orally once a day, last dose 2/23/18  · 	Vitamin D 3: Last Dose Taken:  , 1000 unit(s) orally once a day, last dose 2/23/18  · 	loratadine 10 mg oral tablet: Last Dose Taken:  , 1 tab(s) orally once a day, pm  · 	Omega 3 acid: Last Dose Taken:  , 1  orally 2 times a day, last dose 2/23/18  · 	Advair Diskus 250 mcg-50 mcg inhalation powder: Last Dose Taken:  , 1 puff(s) inhaled 2 times a day  · 	fluticasone 50 mcg inhalation powder: Last Dose Taken:  , 1 puff(s) inhaled 2 times a day    MEDICATIONS  (STANDING):  atorvastatin 10 milliGRAM(s) Oral at bedtime  buDESOnide 160 MICROgram(s)/formoterol 4.5 MICROgram(s) Inhaler 2 Puff(s) Inhalation two times a day  dextrose 5%. 1000 milliLiter(s) (50 mL/Hr) IV Continuous <Continuous>  dextrose 50% Injectable 12.5 Gram(s) IV Push once  dextrose 50% Injectable 25 Gram(s) IV Push once  dextrose 50% Injectable 25 Gram(s) IV Push once  diltiazem    Tablet 30 milliGRAM(s) Oral every 8 hours  docusate sodium 100 milliGRAM(s) Oral three times a day  heparin  Injectable 5000 Unit(s) SubCutaneous every 8 hours  insulin lispro (HumaLOG) corrective regimen sliding scale   SubCutaneous Before meals and at bedtime  levalbuterol Inhalation 0.63 milliGRAM(s) Inhalation every 6 hours  loratadine 10 milliGRAM(s) Oral daily  methylPREDNISolone sodium succinate Injectable 60 milliGRAM(s) IV Push four times a day  pantoprazole    Tablet 40 milliGRAM(s) Oral before breakfast  piperacillin/tazobactam IVPB. 3.375 Gram(s) IV Intermittent every 8 hours  senna 2 Tablet(s) Oral at bedtime  tamsulosin 0.4 milliGRAM(s) Oral at bedtime  vancomycin  IVPB 1000 milliGRAM(s) IV Intermittent every 12 hours    MEDICATIONS  (PRN):  acetaminophen   Tablet. 650 milliGRAM(s) Oral every 6 hours PRN Mild Pain (1 - 3)  ALBUTerol    90 MICROgram(s) HFA Inhaler 2 Puff(s) Inhalation every 4 hours PRN Shortness of Breath  benzocaine 15 mG/menthol 3.6 mG Lozenge 1 Lozenge Oral every 4 hours PRN Sore Throat  bisacodyl Suppository 10 milliGRAM(s) Rectal daily PRN Constipation  dextrose Gel 1 Dose(s) Oral once PRN Blood Glucose LESS THAN 70 milliGRAM(s)/deciliter  glucagon  Injectable 1 milliGRAM(s) IntraMuscular once PRN Glucose LESS THAN 70 milligrams/deciliter  guaiFENesin    Syrup 100 milliGRAM(s) Oral every 6 hours PRN Cough  oxyCODONE    IR 5 milliGRAM(s) Oral every 4 hours PRN Moderate Pain (4 - 6)  oxyCODONE    IR 10 milliGRAM(s) Oral every 4 hours PRN Severe Pain (7 - 10)  polyethylene glycol 3350 17 Gram(s) Oral daily PRN Constipation      ICU Vital Signs Last 24 Hrs  T(C): 36.4 (07 Mar 2018 07:58), Max: 36.8 (06 Mar 2018 21:45)  T(F): 97.5 (07 Mar 2018 07:58), Max: 98.3 (07 Mar 2018 01:25)  HR: 89 (07 Mar 2018 08:02) (81 - 96)  BP: 105/61 (07 Mar 2018 07:58) (105/61 - 136/89)  RR: 20 (07 Mar 2018 07:58) (17 - 20)  SpO2: 94% (07 Mar 2018 08:02) (92% - 99%)      Physical exam:                                                   Gen:                    WD WN in NAD                          Neuro:                Alert & Oriented X 3 , no focal deficits                          Cardiovascular:   S1 & S2, regular                          Respiratory:        Air entry is fair and decreased on both sides R>L, has bilateral conducted sounds /+ honchi /rales                          GI:                      + Bowel sounds Soft, nondistended and nontender,                           Ext:                     No cyanosis or edema,   b    I&O's Summary    06 Mar 2018 07:01  -  07 Mar 2018 07:00  --------------------------------------------------------  IN: 550 mL / OUT: 300 mL / NET: 250 mL      Labs:                                                                           14.5   13.48 )-----------( 219      ( 07 Mar 2018 06:00 )             42.5                            03-07    141  |  102  |  21  ----------------------------<  160<H>  3.7   |  24  |  0.80    Ca    8.2<L>      07 Mar 2018 06:00    POCT Blood Glucose.: 187 mg/dL (07 Mar 2018 11:56)    CXR  [id number]  INTERPRETATION:   Heart size and the mediastinum cannot be accurately evaluated on this   projection.  Right lung volume loss with postsurgical change again seen.  There is small right apical pneumothorax which is slightly decreased in   size.  Right subcutaneous emphysema is not significantly changed.  There is slight improvement in previous right lower lung subsegmental   atelectasis.  A trace right pleural effusion is unchanged.  The left lung is clear. There is no left pleural effusion.  IMPRESSION:  Small right apical pneumothorax, slightly decreased in size.  Right subcutaneous emphysema, not significantly changed.  Slight improvement in previous right lower lung subsegmental atelectasis.                           Plan:  72 M PMH mild Asthma  mild, BPH, triglyceridemia, fatty liver, DALIA on CPAP, renal calculi, TB w SPN  The patient had a fever & cough w an abnormal CXR in Feb 2018, treated w ABXs. Follow up CT scan revealed a right lower lung mass. Currently, the pt has no SOB and his cough has resolved.   POD # 1 ([id number]): Navigational bronchoscopy, VATS, RLLectomy, MLND, FOB  Issues:   Pneumomediatinyum  SOB  Post op pain  Aspiration PNA?  	DALIA  	HLD  	BPH  	Asthma  	BPH                            Neuro:                                         Pain control with PCA / Tylenol IV                             Cardiovascular:                                          Continue hemodynamic monitoring.                                         Not on any pressors                                         Continue cardiovascular / antihypertensive medications      Resume BBs asap                            Respiratory:                                         Pt is on nasal canula                                         Comfortable, not in any distress.                                         Use incentive spirometry asap     Monitor chest tube output                                         Chest tube to suction                                          Continue bronchodilators, pulmonary toilet                            GI                                         On regular  diet                                         Continue GI prophylaxis with Protonix                                         Continue Zofran / Reglan for nausea - PRN	                                  Renal:                                         Continue IVF                                         Monitor I/Os and electrolytes                                         Keep Bell                                                 Hematologic / Oncology:                                         SQH & SCDs for VTE prophylaxis                                         Monitor chest tube output. No signs of active bleeding.                                          Follow CBC in AM                           Infectious disease:                                          No signs of infection. Monitor for fever / leukocytosis.                                          All surgical incision / chest tube  sites look clean                                          D/C Bell                            Endocrine:                                           Continue Finger stick glucose checks with coverage    All available pertinent clinical, laboratory, radiographic, hemodynamic, echocardiographic, respiratory data, microbiologic data and chart were reviewed and analyzed frequently  Patient seen, examined and plan discussed with CT Surgeon/ CTICU team during rounds.              I spent 80 minutes with this patient.    Alfredo Mathews MD

## 2018-03-07 NOTE — PROGRESS NOTE ADULT - SUBJECTIVE AND OBJECTIVE BOX
Subjective: pt with multiple coughing fits yesterday and this AM, increased SQ air and voice muffled, denies fevers/chills, admits shortness of breath especially with excertion    Vital Signs:  Vital Signs Last 24 Hrs  T(C): 36.4 (03-07-18 @ 07:58), Max: 36.8 (03-06-18 @ 21:45)  T(F): 97.5 (03-07-18 @ 07:58), Max: 98.3 (03-07-18 @ 01:25)  HR: 89 (03-07-18 @ 08:02) (81 - 101)  BP: 105/61 (03-07-18 @ 07:58) (105/61 - 136/89)  RR: 20 (03-07-18 @ 07:58) (17 - 20)  SpO2: 94% (03-07-18 @ 08:02) (92% - 99%) on (O2)    Telemetry/Alarms:  General: WN/WD NAD  Neurology: Awake, nonfocal, TORRES x 4  Eyes: Scleras clear, PERRLA/ EOMI, Gross vision intact  ENT: Gross hearing intact, grossly patent pharynx, no stridor  Neck: Neck supple, trachea midline, No JVD, increased SQ air, muffled voice  Respiratory: CTA B/L, No wheezing, rales, rhonchi  CV: RRR, S1S2, no murmurs, rubs or gallops  Abdominal: Soft, NT, ND +BS,   Extremities: No edema, + peripheral pulses  Skin: No Rashes, Hematoma, Ecchymosis  Lymphatic: No Neck, axilla, groin LAD  Psych: Oriented x 3, normal affect  Incisions: c,d,i; no chest tube    Relevant labs, radiology and Medications reviewed                        14.5   13.48 )-----------( 219      ( 07 Mar 2018 06:00 )             42.5     03-07    141  |  102  |  21  ----------------------------<  160<H>  3.7   |  24  |  0.80    Ca    8.2<L>      07 Mar 2018 06:00        MEDICATIONS  (STANDING):  atorvastatin 10 milliGRAM(s) Oral at bedtime  buDESOnide 160 MICROgram(s)/formoterol 4.5 MICROgram(s) Inhaler 2 Puff(s) Inhalation two times a day  dextrose 5%. 1000 milliLiter(s) (50 mL/Hr) IV Continuous <Continuous>  dextrose 50% Injectable 12.5 Gram(s) IV Push once  dextrose 50% Injectable 25 Gram(s) IV Push once  dextrose 50% Injectable 25 Gram(s) IV Push once  diltiazem    Tablet 30 milliGRAM(s) Oral every 8 hours  docusate sodium 100 milliGRAM(s) Oral three times a day  furosemide   Injectable 10 milliGRAM(s) IV Push once  heparin  Injectable 5000 Unit(s) SubCutaneous every 8 hours  insulin lispro (HumaLOG) corrective regimen sliding scale   SubCutaneous Before meals and at bedtime  levalbuterol Inhalation 0.63 milliGRAM(s) Inhalation every 6 hours  loratadine 10 milliGRAM(s) Oral daily  methylPREDNISolone sodium succinate Injectable 20 milliGRAM(s) IV Push every 8 hours  pantoprazole    Tablet 40 milliGRAM(s) Oral before breakfast  piperacillin/tazobactam IVPB. 3.375 Gram(s) IV Intermittent once  piperacillin/tazobactam IVPB. 3.375 Gram(s) IV Intermittent every 8 hours  senna 2 Tablet(s) Oral at bedtime  tamsulosin 0.4 milliGRAM(s) Oral at bedtime  vancomycin  IVPB 1000 milliGRAM(s) IV Intermittent every 12 hours    MEDICATIONS  (PRN):  acetaminophen   Tablet. 650 milliGRAM(s) Oral every 6 hours PRN Mild Pain (1 - 3)  ALBUTerol    90 MICROgram(s) HFA Inhaler 2 Puff(s) Inhalation every 4 hours PRN Shortness of Breath  benzocaine 15 mG/menthol 3.6 mG Lozenge 1 Lozenge Oral every 4 hours PRN Sore Throat  bisacodyl Suppository 10 milliGRAM(s) Rectal daily PRN Constipation  dextrose Gel 1 Dose(s) Oral once PRN Blood Glucose LESS THAN 70 milliGRAM(s)/deciliter  glucagon  Injectable 1 milliGRAM(s) IntraMuscular once PRN Glucose LESS THAN 70 milligrams/deciliter  guaiFENesin    Syrup 100 milliGRAM(s) Oral every 6 hours PRN Cough  oxyCODONE    IR 5 milliGRAM(s) Oral every 4 hours PRN Moderate Pain (4 - 6)  oxyCODONE    IR 10 milliGRAM(s) Oral every 4 hours PRN Severe Pain (7 - 10)  polyethylene glycol 3350 17 Gram(s) Oral daily PRN Constipation    Pertinent Physical Exam  I&O's Summary    06 Mar 2018 07:01  -  07 Mar 2018 07:00  --------------------------------------------------------  IN: 550 mL / OUT: 300 mL / NET: 250 mL      < from: CT Angio Chest w/ IV Cont (03.06.18 @ 17:46) >  ******PRELIMINARY REPORT******            INTERPRETATION:  No Pulmonary embolus.    Small left hydropneumothorax, extensive pneumomediastinum, and extensive   subcutaneous emphysema compatible with recent VATS.    < end of copied text >      Assessment  72y Male  w/ PAST MEDICAL & SURGICAL HISTORY:  Impaired glucose tolerance: on diet control  Other nonspecific abnormal finding of lung field  DALIA on CPAP  TB (tuberculosis): age 20:  Treated with meds X 1year  Fatty liver  Renal calculi: &#x27; 2005:  passed  BPH (Benign Prostatic Hyperplasia)  Asthma: mild  Elevated triglycerides with high cholesterol  Foot fracture, left: &quot; 90&#x27;s  History of prostate surgery: greenlight laser 2013  H/O left inguinal hernia repair  Status post cataract extraction: bilateral  Foot fracture, left: &#x27; 90&#x27;s:  ORIF  admitted with complaints of Patient is a 72y old  Male who presents with a chief complaint of other specific abnormal finding of lung field (05 Mar 2018 19:53)  .  On (Date), patient underwent Navigational bronchoscopy  Flexible bronchoscopy, RLL Lobectomy, lung, robot assisted  . Postoperative course/issues: bronchospasm, coughing fits, SQ air, elevated WBC, CT chest yesterday revealed RML consolidation    PLAN  transfer pt to CTICU   Neuro: Pain management  Pulm: Encourage coughing, deep breathing and use of incentive spirometry. Wean off supplemental oxygen as able. Daily CXR. IV steroids q8, nebulizers, coughing fits alleviated with xopenex and racemic epinephrine; CTA reviewed by Dr Maier; consider PTC by IR  Cardio: Monitor telemetry/alarms; PAF - afib exacerbated by coughing fits currently on cardizem PO  GI: Tolerating diet. Continue stool softeners.  Renal: monitor urine output, supplement electrolytes as needed; lasix 10mg IVPush  Vasc: Heparin SC/SCDs for DVT prophylaxis  Heme: Stable H/H. .   ID: Pan cultured, start vanco/zosyn  Therapy: OOB/ambulate    Disposition: transfer to CTICU  Discussed with Dr. Maier, intensivist, Cardiothoracic Team at AM rounds.

## 2018-03-08 LAB
BUN SERPL-MCNC: 22 MG/DL — SIGNIFICANT CHANGE UP (ref 7–23)
CA-I BLD-SCNC: 0.97 MMOL/L — LOW (ref 1.03–1.23)
CALCIUM SERPL-MCNC: 7.9 MG/DL — LOW (ref 8.4–10.5)
CHLORIDE SERPL-SCNC: 102 MMOL/L — SIGNIFICANT CHANGE UP (ref 98–107)
CO2 SERPL-SCNC: 23 MMOL/L — SIGNIFICANT CHANGE UP (ref 22–31)
CREAT SERPL-MCNC: 0.72 MG/DL — SIGNIFICANT CHANGE UP (ref 0.5–1.3)
GLUCOSE SERPL-MCNC: 216 MG/DL — HIGH (ref 70–99)
HCT VFR BLD CALC: 40.4 % — SIGNIFICANT CHANGE UP (ref 39–50)
HGB BLD-MCNC: 13.6 G/DL — SIGNIFICANT CHANGE UP (ref 13–17)
MAGNESIUM SERPL-MCNC: 2.7 MG/DL — HIGH (ref 1.6–2.6)
MCHC RBC-ENTMCNC: 30 PG — SIGNIFICANT CHANGE UP (ref 27–34)
MCHC RBC-ENTMCNC: 33.7 % — SIGNIFICANT CHANGE UP (ref 32–36)
MCV RBC AUTO: 89.2 FL — SIGNIFICANT CHANGE UP (ref 80–100)
NRBC # FLD: 0 — SIGNIFICANT CHANGE UP
PHOSPHATE SERPL-MCNC: 3.4 MG/DL — SIGNIFICANT CHANGE UP (ref 2.5–4.5)
PLATELET # BLD AUTO: 225 K/UL — SIGNIFICANT CHANGE UP (ref 150–400)
PMV BLD: 10.7 FL — SIGNIFICANT CHANGE UP (ref 7–13)
POTASSIUM SERPL-MCNC: 4.3 MMOL/L — SIGNIFICANT CHANGE UP (ref 3.5–5.3)
POTASSIUM SERPL-SCNC: 4.3 MMOL/L — SIGNIFICANT CHANGE UP (ref 3.5–5.3)
RBC # BLD: 4.53 M/UL — SIGNIFICANT CHANGE UP (ref 4.2–5.8)
RBC # FLD: 13.4 % — SIGNIFICANT CHANGE UP (ref 10.3–14.5)
SODIUM SERPL-SCNC: 141 MMOL/L — SIGNIFICANT CHANGE UP (ref 135–145)
SPECIMEN SOURCE: SIGNIFICANT CHANGE UP
SPECIMEN SOURCE: SIGNIFICANT CHANGE UP
VANCOMYCIN TROUGH SERPL-MCNC: 6.4 UG/ML — LOW (ref 10–20)
WBC # BLD: 17.73 K/UL — HIGH (ref 3.8–10.5)
WBC # FLD AUTO: 17.73 K/UL — HIGH (ref 3.8–10.5)

## 2018-03-08 PROCEDURE — 71045 X-RAY EXAM CHEST 1 VIEW: CPT | Mod: 26

## 2018-03-08 PROCEDURE — 99233 SBSQ HOSP IP/OBS HIGH 50: CPT

## 2018-03-08 RX ORDER — ACETAMINOPHEN 500 MG
1000 TABLET ORAL ONCE
Qty: 0 | Refills: 0 | Status: COMPLETED | OUTPATIENT
Start: 2018-03-08 | End: 2018-03-08

## 2018-03-08 RX ORDER — INSULIN LISPRO 100/ML
VIAL (ML) SUBCUTANEOUS
Qty: 0 | Refills: 0 | Status: DISCONTINUED | OUTPATIENT
Start: 2018-03-08 | End: 2018-03-12

## 2018-03-08 RX ADMIN — OXYCODONE HYDROCHLORIDE 10 MILLIGRAM(S): 5 TABLET ORAL at 23:00

## 2018-03-08 RX ADMIN — PIPERACILLIN AND TAZOBACTAM 25 GRAM(S): 4; .5 INJECTION, POWDER, LYOPHILIZED, FOR SOLUTION INTRAVENOUS at 03:14

## 2018-03-08 RX ADMIN — Medication 100 MILLIGRAM(S): at 22:33

## 2018-03-08 RX ADMIN — OXYCODONE HYDROCHLORIDE 10 MILLIGRAM(S): 5 TABLET ORAL at 22:30

## 2018-03-08 RX ADMIN — HEPARIN SODIUM 5000 UNIT(S): 5000 INJECTION INTRAVENOUS; SUBCUTANEOUS at 05:33

## 2018-03-08 RX ADMIN — Medication 250 MILLIGRAM(S): at 18:17

## 2018-03-08 RX ADMIN — Medication 1: at 07:55

## 2018-03-08 RX ADMIN — Medication 1000 MILLIGRAM(S): at 18:45

## 2018-03-08 RX ADMIN — Medication 100 MILLIGRAM(S): at 13:21

## 2018-03-08 RX ADMIN — Medication 400 MILLIGRAM(S): at 18:30

## 2018-03-08 RX ADMIN — OXYCODONE HYDROCHLORIDE 5 MILLIGRAM(S): 5 TABLET ORAL at 05:32

## 2018-03-08 RX ADMIN — OXYCODONE HYDROCHLORIDE 5 MILLIGRAM(S): 5 TABLET ORAL at 10:17

## 2018-03-08 RX ADMIN — Medication 60 MILLIGRAM(S): at 05:33

## 2018-03-08 RX ADMIN — Medication 60 MILLIGRAM(S): at 11:02

## 2018-03-08 RX ADMIN — PIPERACILLIN AND TAZOBACTAM 25 GRAM(S): 4; .5 INJECTION, POWDER, LYOPHILIZED, FOR SOLUTION INTRAVENOUS at 18:55

## 2018-03-08 RX ADMIN — OXYCODONE HYDROCHLORIDE 5 MILLIGRAM(S): 5 TABLET ORAL at 11:00

## 2018-03-08 RX ADMIN — OXYCODONE HYDROCHLORIDE 5 MILLIGRAM(S): 5 TABLET ORAL at 17:22

## 2018-03-08 RX ADMIN — Medication 250 MILLIGRAM(S): at 05:31

## 2018-03-08 RX ADMIN — Medication 2: at 16:51

## 2018-03-08 RX ADMIN — OXYCODONE HYDROCHLORIDE 5 MILLIGRAM(S): 5 TABLET ORAL at 06:02

## 2018-03-08 RX ADMIN — PIPERACILLIN AND TAZOBACTAM 25 GRAM(S): 4; .5 INJECTION, POWDER, LYOPHILIZED, FOR SOLUTION INTRAVENOUS at 11:01

## 2018-03-08 RX ADMIN — LORATADINE 10 MILLIGRAM(S): 10 TABLET ORAL at 11:02

## 2018-03-08 RX ADMIN — BUDESONIDE AND FORMOTEROL FUMARATE DIHYDRATE 2 PUFF(S): 160; 4.5 AEROSOL RESPIRATORY (INHALATION) at 22:14

## 2018-03-08 RX ADMIN — LEVALBUTEROL 0.63 MILLIGRAM(S): 1.25 SOLUTION, CONCENTRATE RESPIRATORY (INHALATION) at 15:56

## 2018-03-08 RX ADMIN — Medication 40 MILLIGRAM(S): at 22:33

## 2018-03-08 RX ADMIN — LEVALBUTEROL 0.63 MILLIGRAM(S): 1.25 SOLUTION, CONCENTRATE RESPIRATORY (INHALATION) at 10:51

## 2018-03-08 RX ADMIN — Medication 100 MILLIGRAM(S): at 05:31

## 2018-03-08 RX ADMIN — LEVALBUTEROL 0.63 MILLIGRAM(S): 1.25 SOLUTION, CONCENTRATE RESPIRATORY (INHALATION) at 22:16

## 2018-03-08 RX ADMIN — SENNA PLUS 2 TABLET(S): 8.6 TABLET ORAL at 22:33

## 2018-03-08 RX ADMIN — ATORVASTATIN CALCIUM 10 MILLIGRAM(S): 80 TABLET, FILM COATED ORAL at 22:32

## 2018-03-08 RX ADMIN — Medication 4: at 11:33

## 2018-03-08 RX ADMIN — HEPARIN SODIUM 5000 UNIT(S): 5000 INJECTION INTRAVENOUS; SUBCUTANEOUS at 22:33

## 2018-03-08 RX ADMIN — LEVALBUTEROL 0.63 MILLIGRAM(S): 1.25 SOLUTION, CONCENTRATE RESPIRATORY (INHALATION) at 04:13

## 2018-03-08 RX ADMIN — HEPARIN SODIUM 5000 UNIT(S): 5000 INJECTION INTRAVENOUS; SUBCUTANEOUS at 13:21

## 2018-03-08 RX ADMIN — PANTOPRAZOLE SODIUM 40 MILLIGRAM(S): 20 TABLET, DELAYED RELEASE ORAL at 05:31

## 2018-03-08 RX ADMIN — OXYCODONE HYDROCHLORIDE 5 MILLIGRAM(S): 5 TABLET ORAL at 18:20

## 2018-03-08 RX ADMIN — BUDESONIDE AND FORMOTEROL FUMARATE DIHYDRATE 2 PUFF(S): 160; 4.5 AEROSOL RESPIRATORY (INHALATION) at 10:55

## 2018-03-08 RX ADMIN — TAMSULOSIN HYDROCHLORIDE 0.4 MILLIGRAM(S): 0.4 CAPSULE ORAL at 22:33

## 2018-03-08 NOTE — PROVIDER CONTACT NOTE (OTHER) - ASSESSMENT
Patient was having a coughing fit and could breath. Patient also converted to rate controlled a fib.
A&Ox4 Vitals stable. O2 sat 94-96% on 4L nasal cannula. Pt denies dyspnea. incentive spirometer 1200ml

## 2018-03-08 NOTE — PROGRESS NOTE ADULT - SUBJECTIVE AND OBJECTIVE BOX
72 M PMH mild Asthma  mild, BPH, triglyceridemia, fatty liver, DALIA on CPAP, renal calculi, TB w SPN  The patient had a fever & cough w an abnormal CXR in Feb 2018, treated w ABXs. Follow up CT scan revealed a right lower lung mass. Currently, the pt has no SOB and his cough has resolved.     [id number]: Navigational bronchoscopy, VATS, RLLectomy, MLND, FOB    Issues:   Post op pain  	DALIA  	HLD  	BPH  	Asthma  	BPH    The patient developed worsening SOB, SQ air, nasal voice and a RLL infiltrate.  Pan-cultures were obtained and empiric broad spectrum ABXs were started.  The pt was transferred to CT ICU for closer observation.    Allergies:  	strawberry: Food, Confirmed, Patient, Other, Cough  	Cherries: Cough: Food, Confirmed, Patient, Other, Cherries: Cough  	shellfish: Food, Confirmed, Patient, Other, cough  	dust: Miscellaneous, Confirmed, Patient, Sneezing, Environmental Allergy    Home Medications:   * Incomplete Medication History as of 23-Feb-2018 13:42 documented in Structured Notes  · 	atorvastatin 10 mg oral tablet: Last Dose Taken:  , 1 tab(s) orally once a day, pm  · 	multivitamin: Last Dose Taken:  , 1 tab(s) orally once a day, last dose 2/23/18  · 	Vitamin D 3: Last Dose Taken:  , 1000 unit(s) orally once a day, last dose 2/23/18  · 	loratadine 10 mg oral tablet: Last Dose Taken:  , 1 tab(s) orally once a day, pm  · 	Omega 3 acid: Last Dose Taken:  , 1  orally 2 times a day, last dose 2/23/18  · 	Advair Diskus 250 mcg-50 mcg inhalation powder: Last Dose Taken:  , 1 puff(s) inhaled 2 times a day  · 	fluticasone 50 mcg inhalation powder: Last Dose Taken:  , 1 puff(s) inhaled 2 times a day    MEDICATIONS  (STANDING):  atorvastatin 10 milliGRAM(s) Oral at bedtime  buDESOnide 160 MICROgram(s)/formoterol 4.5 MICROgram(s) Inhaler 2 Puff(s) Inhalation two times a day  dextrose 5%. 1000 milliLiter(s) (50 mL/Hr) IV Continuous <Continuous>  dextrose 50% Injectable 12.5 Gram(s) IV Push once  dextrose 50% Injectable 25 Gram(s) IV Push once  dextrose 50% Injectable 25 Gram(s) IV Push once  diltiazem    Tablet 30 milliGRAM(s) Oral every 8 hours  docusate sodium 100 milliGRAM(s) Oral three times a day  heparin  Injectable 5000 Unit(s) SubCutaneous every 8 hours  insulin lispro (HumaLOG) corrective regimen sliding scale   SubCutaneous Before meals and at bedtime  levalbuterol Inhalation 0.63 milliGRAM(s) Inhalation every 6 hours  loratadine 10 milliGRAM(s) Oral daily  methylPREDNISolone sodium succinate Injectable 60 milliGRAM(s) IV Push four times a day  pantoprazole    Tablet 40 milliGRAM(s) Oral before breakfast  piperacillin/tazobactam IVPB. 3.375 Gram(s) IV Intermittent every 8 hours  senna 2 Tablet(s) Oral at bedtime  tamsulosin 0.4 milliGRAM(s) Oral at bedtime  vancomycin  IVPB 1000 milliGRAM(s) IV Intermittent every 12 hours    MEDICATIONS  (PRN):  acetaminophen   Tablet. 650 milliGRAM(s) Oral every 6 hours PRN Mild Pain (1 - 3)  ALBUTerol    90 MICROgram(s) HFA Inhaler 2 Puff(s) Inhalation every 4 hours PRN Shortness of Breath  benzocaine 15 mG/menthol 3.6 mG Lozenge 1 Lozenge Oral every 4 hours PRN Sore Throat  bisacodyl Suppository 10 milliGRAM(s) Rectal daily PRN Constipation  dextrose Gel 1 Dose(s) Oral once PRN Blood Glucose LESS THAN 70 milliGRAM(s)/deciliter  glucagon  Injectable 1 milliGRAM(s) IntraMuscular once PRN Glucose LESS THAN 70 milligrams/deciliter  guaiFENesin    Syrup 100 milliGRAM(s) Oral every 6 hours PRN Cough  oxyCODONE    IR 5 milliGRAM(s) Oral every 4 hours PRN Moderate Pain (4 - 6)  oxyCODONE    IR 10 milliGRAM(s) Oral every 4 hours PRN Severe Pain (7 - 10)  polyethylene glycol 3350 17 Gram(s) Oral daily PRN Constipation      ICU Vital Signs Last 24 Hrs  T(C): 36.1 (08 Mar 2018 04:00), Max: 36.9 (07 Mar 2018 16:00)  T(F): 96.9 (08 Mar 2018 04:00), Max: 98.4 (07 Mar 2018 16:00)  HR: 99 (08 Mar 2018 06:20) (72 - 99)  BP: 112/71 (08 Mar 2018 06:00) (102/67 - 143/77)  BP(mean): 80 (08 Mar 2018 06:00) (68 - 93)  RR: 31 (08 Mar 2018 06:20) (12 - 31)  SpO2: 90% (08 Mar 2018 06:20) (90% - 98%)      Physical exam:                           General			WDWN in NAD	  HEENT			NC, AT, PERRL; EOMI; conjunctiva clear, No oral lesions; gross abnormalities  Neck			Supple  Resp			Good B/L air ebntery, decreased at bases R>L, + rhonchi/ crackles  CVS:  			regular rate and rhythm  no murmur  EXT			no clubbing; no cyanosis; no pedal edema  Neuro:			Sedated, arousable, No focal deficits      I&O's Summary    06 Mar 2018 07:01  -  07 Mar 2018 07:00  --------------------------------------------------------  IN: 550 mL / OUT: 300 mL / NET: 250 mL    07 Mar 2018 07:01  -  08 Mar 2018 06:47  --------------------------------------------------------  IN: 1580 mL / OUT: 1600 mL / NET: -20 mL      Labs:                                                                           13.6   17.73 )-----------( 225      ( 08 Mar 2018 05:50 )             40.4                            03-07    141  |  102  |  21  ----------------------------<  160<H>  3.7   |  24  |  0.80    Ca    8.2<L>      07 Mar 2018 06:00      CAPILLARY BLOOD GLUCOSE      POCT Blood Glucose.: 218 mg/dL (07 Mar 2018 21:55)      Plan:  72 M PMH mild Asthma  mild, BPH, triglyceridemia, fatty liver, DALIA on CPAP, renal calculi, TB w SPN  The patient had a fever & cough w an abnormal CXR in Feb 2018, treated w ABXs. Follow up CT scan revealed a right lower lung mass. Currently, the pt has no SOB and his cough has resolved.   POD # 1 ([id number]): Navigational bronchoscopy, VATS, RLLectomy, MLND, FOB  Issues:   Post op pain  	DALIA  	HLD  	BPH  	Asthma  	BPH                            Neuro:                                         Pain control with PCA / Tylenol IV                             Cardiovascular:                                          Continue hemodynamic monitoring.                                         Not on any pressors                                         Continue cardiovascular / antihypertensive medications      Resume BBs asap                            Respiratory:                                         Pt is on nasal canula                                         Comfortable, not in any distress.                                         Use incentive spirometry asap     Monitor chest tube output                                         Chest tube to suction                                          Continue bronchodilators, pulmonary toilet                            GI                                         On regular  diet                                         Continue GI prophylaxis with Protonix                                         Continue Zofran / Reglan for nausea - PRN	                                  Renal:                                         Continue IVF                                         Monitor I/Os and electrolytes                                         Keep Bell                                                 Hematologic / Oncology:                                         SQH & SCDs for VTE prophylaxis                                         Monitor chest tube output. No signs of active bleeding.                                          Follow CBC in AM                           Infectious disease:                                          No signs of infection. Monitor for fever / leukocytosis.                                          All surgical incision / chest tube  sites look clean                                          D/C Bell                            Endocrine:                                           Continue Finger stick glucose checks with coverage    All available pertinent clinical, laboratory, radiographic, hemodynamic, echocardiographic, respiratory data, microbiologic data and chart were reviewed and analyzed frequently  Patient seen, examined and plan discussed with CT Surgeon/ CTICU team during rounds.              Alfredo Mathews MD

## 2018-03-08 NOTE — DIETITIAN INITIAL EVALUATION ADULT. - OTHER INFO
Nutrition assessment initiated for critical care LOS. Pt. alert, oriented , tolerating PO diet , reports taking > 75% of the melas , wt. loss 2/2 diagnosis and surgery . Noted food allergies  to cherries, shellfish, and strawberries .  No difficulty chewing , swallowing , no GI issues .

## 2018-03-08 NOTE — DIETITIAN INITIAL EVALUATION ADULT. - PERTINENT MEDS FT
Vancomycin, CARDIZEM, FLOMAX, LIPITOR, HUMALOG, COLACE, PROTONIX, Senna, Solu- MEDROL, Zosyn , Miralax

## 2018-03-08 NOTE — PROVIDER CONTACT NOTE (OTHER) - ACTION/TREATMENT ORDERED:
Racemic epi given by PA and cardizem given early
MD Mathews states okay to have patient on nasal cannula, keep CPAP machine at bedside

## 2018-03-09 LAB
ALBUMIN SERPL ELPH-MCNC: 3.2 G/DL — LOW (ref 3.3–5)
ALP SERPL-CCNC: 51 U/L — SIGNIFICANT CHANGE UP (ref 40–120)
ALT FLD-CCNC: 27 U/L — SIGNIFICANT CHANGE UP (ref 4–41)
AST SERPL-CCNC: 29 U/L — SIGNIFICANT CHANGE UP (ref 4–40)
BASOPHILS # BLD AUTO: 0.03 K/UL — SIGNIFICANT CHANGE UP (ref 0–0.2)
BASOPHILS NFR BLD AUTO: 0.2 % — SIGNIFICANT CHANGE UP (ref 0–2)
BASOPHILS NFR SPEC: 0 % — SIGNIFICANT CHANGE UP (ref 0–2)
BASOPHILS NFR SPEC: 0 % — SIGNIFICANT CHANGE UP (ref 0–2)
BILIRUB SERPL-MCNC: 0.5 MG/DL — SIGNIFICANT CHANGE UP (ref 0.2–1.2)
BUN SERPL-MCNC: 24 MG/DL — HIGH (ref 7–23)
BUN SERPL-MCNC: 24 MG/DL — HIGH (ref 7–23)
CA-I BLD-SCNC: 0.97 MMOL/L — LOW (ref 1.03–1.23)
CALCIUM SERPL-MCNC: 7.9 MG/DL — LOW (ref 8.4–10.5)
CALCIUM SERPL-MCNC: 7.9 MG/DL — LOW (ref 8.4–10.5)
CHLORIDE SERPL-SCNC: 104 MMOL/L — SIGNIFICANT CHANGE UP (ref 98–107)
CHLORIDE SERPL-SCNC: 104 MMOL/L — SIGNIFICANT CHANGE UP (ref 98–107)
CO2 SERPL-SCNC: 21 MMOL/L — LOW (ref 22–31)
CO2 SERPL-SCNC: 21 MMOL/L — LOW (ref 22–31)
CREAT SERPL-MCNC: 0.79 MG/DL — SIGNIFICANT CHANGE UP (ref 0.5–1.3)
CREAT SERPL-MCNC: 0.79 MG/DL — SIGNIFICANT CHANGE UP (ref 0.5–1.3)
EOSINOPHIL # BLD AUTO: 0 K/UL — SIGNIFICANT CHANGE UP (ref 0–0.5)
EOSINOPHIL NFR BLD AUTO: 0 % — SIGNIFICANT CHANGE UP (ref 0–6)
EOSINOPHIL NFR FLD: 0 % — SIGNIFICANT CHANGE UP (ref 0–6)
EOSINOPHIL NFR FLD: 0 % — SIGNIFICANT CHANGE UP (ref 0–6)
GIANT PLATELETS BLD QL SMEAR: PRESENT — SIGNIFICANT CHANGE UP
GIANT PLATELETS BLD QL SMEAR: PRESENT — SIGNIFICANT CHANGE UP
GLUCOSE SERPL-MCNC: 194 MG/DL — HIGH (ref 70–99)
GLUCOSE SERPL-MCNC: 194 MG/DL — HIGH (ref 70–99)
HCT VFR BLD CALC: 41 % — SIGNIFICANT CHANGE UP (ref 39–50)
HCT VFR BLD CALC: 41 % — SIGNIFICANT CHANGE UP (ref 39–50)
HGB BLD-MCNC: 13.6 G/DL — SIGNIFICANT CHANGE UP (ref 13–17)
HGB BLD-MCNC: 13.6 G/DL — SIGNIFICANT CHANGE UP (ref 13–17)
IMM GRANULOCYTES # BLD AUTO: 1.09 # — SIGNIFICANT CHANGE UP
IMM GRANULOCYTES NFR BLD AUTO: 6.3 % — HIGH (ref 0–1.5)
LYMPHOCYTES # BLD AUTO: 0.88 K/UL — LOW (ref 1–3.3)
LYMPHOCYTES # BLD AUTO: 5.1 % — LOW (ref 13–44)
LYMPHOCYTES NFR SPEC AUTO: 2.7 % — LOW (ref 13–44)
LYMPHOCYTES NFR SPEC AUTO: 2.7 % — LOW (ref 13–44)
MAGNESIUM SERPL-MCNC: 2.7 MG/DL — HIGH (ref 1.6–2.6)
MCHC RBC-ENTMCNC: 30.2 PG — SIGNIFICANT CHANGE UP (ref 27–34)
MCHC RBC-ENTMCNC: 30.2 PG — SIGNIFICANT CHANGE UP (ref 27–34)
MCHC RBC-ENTMCNC: 33.2 % — SIGNIFICANT CHANGE UP (ref 32–36)
MCHC RBC-ENTMCNC: 33.2 % — SIGNIFICANT CHANGE UP (ref 32–36)
MCV RBC AUTO: 91.1 FL — SIGNIFICANT CHANGE UP (ref 80–100)
MCV RBC AUTO: 91.1 FL — SIGNIFICANT CHANGE UP (ref 80–100)
MONOCYTES # BLD AUTO: 0.5 K/UL — SIGNIFICANT CHANGE UP (ref 0–0.9)
MONOCYTES NFR BLD AUTO: 2.9 % — SIGNIFICANT CHANGE UP (ref 2–14)
MONOCYTES NFR BLD: 2.7 % — SIGNIFICANT CHANGE UP (ref 2–9)
MONOCYTES NFR BLD: 2.7 % — SIGNIFICANT CHANGE UP (ref 2–9)
MORPHOLOGY BLD-IMP: NORMAL — SIGNIFICANT CHANGE UP
MORPHOLOGY BLD-IMP: NORMAL — SIGNIFICANT CHANGE UP
MYELOCYTES NFR BLD: 0.9 % — HIGH (ref 0–0)
MYELOCYTES NFR BLD: 0.9 % — HIGH (ref 0–0)
NEUTROPHIL AB SER-ACNC: 92.8 % — HIGH (ref 43–77)
NEUTROPHIL AB SER-ACNC: 92.8 % — HIGH (ref 43–77)
NEUTROPHILS # BLD AUTO: 14.83 K/UL — HIGH (ref 1.8–7.4)
NEUTROPHILS NFR BLD AUTO: 85.5 % — HIGH (ref 43–77)
NEUTS BAND # BLD: 0.9 % — SIGNIFICANT CHANGE UP (ref 0–6)
NEUTS BAND # BLD: 0.9 % — SIGNIFICANT CHANGE UP (ref 0–6)
NRBC # FLD: 0 — SIGNIFICANT CHANGE UP
NRBC # FLD: 0 — SIGNIFICANT CHANGE UP
PHOSPHATE SERPL-MCNC: 3.6 MG/DL — SIGNIFICANT CHANGE UP (ref 2.5–4.5)
PLATELET # BLD AUTO: 251 K/UL — SIGNIFICANT CHANGE UP (ref 150–400)
PLATELET # BLD AUTO: 251 K/UL — SIGNIFICANT CHANGE UP (ref 150–400)
PLATELET COUNT - ESTIMATE: NORMAL — SIGNIFICANT CHANGE UP
PLATELET COUNT - ESTIMATE: NORMAL — SIGNIFICANT CHANGE UP
PMV BLD: 10.3 FL — SIGNIFICANT CHANGE UP (ref 7–13)
PMV BLD: 10.3 FL — SIGNIFICANT CHANGE UP (ref 7–13)
POTASSIUM SERPL-MCNC: 4.6 MMOL/L — SIGNIFICANT CHANGE UP (ref 3.5–5.3)
POTASSIUM SERPL-MCNC: 4.6 MMOL/L — SIGNIFICANT CHANGE UP (ref 3.5–5.3)
POTASSIUM SERPL-SCNC: 4.6 MMOL/L — SIGNIFICANT CHANGE UP (ref 3.5–5.3)
POTASSIUM SERPL-SCNC: 4.6 MMOL/L — SIGNIFICANT CHANGE UP (ref 3.5–5.3)
PROT SERPL-MCNC: 6.6 G/DL — SIGNIFICANT CHANGE UP (ref 6–8.3)
RBC # BLD: 4.5 M/UL — SIGNIFICANT CHANGE UP (ref 4.2–5.8)
RBC # BLD: 4.5 M/UL — SIGNIFICANT CHANGE UP (ref 4.2–5.8)
RBC # FLD: 13.5 % — SIGNIFICANT CHANGE UP (ref 10.3–14.5)
RBC # FLD: 13.5 % — SIGNIFICANT CHANGE UP (ref 10.3–14.5)
SODIUM SERPL-SCNC: 140 MMOL/L — SIGNIFICANT CHANGE UP (ref 135–145)
SODIUM SERPL-SCNC: 140 MMOL/L — SIGNIFICANT CHANGE UP (ref 135–145)
WBC # BLD: 17.33 K/UL — HIGH (ref 3.8–10.5)
WBC # BLD: 17.33 K/UL — HIGH (ref 3.8–10.5)
WBC # FLD AUTO: 17.33 K/UL — HIGH (ref 3.8–10.5)
WBC # FLD AUTO: 17.33 K/UL — HIGH (ref 3.8–10.5)

## 2018-03-09 PROCEDURE — 71045 X-RAY EXAM CHEST 1 VIEW: CPT | Mod: 26

## 2018-03-09 PROCEDURE — 99232 SBSQ HOSP IP/OBS MODERATE 35: CPT

## 2018-03-09 RX ORDER — ACETAMINOPHEN 500 MG
1000 TABLET ORAL ONCE
Qty: 0 | Refills: 0 | Status: COMPLETED | OUTPATIENT
Start: 2018-03-09 | End: 2018-03-09

## 2018-03-09 RX ORDER — INSULIN LISPRO 100/ML
8 VIAL (ML) SUBCUTANEOUS
Qty: 0 | Refills: 0 | Status: DISCONTINUED | OUTPATIENT
Start: 2018-03-09 | End: 2018-03-12

## 2018-03-09 RX ADMIN — Medication 40 MILLIGRAM(S): at 17:35

## 2018-03-09 RX ADMIN — PANTOPRAZOLE SODIUM 40 MILLIGRAM(S): 20 TABLET, DELAYED RELEASE ORAL at 06:55

## 2018-03-09 RX ADMIN — OXYCODONE HYDROCHLORIDE 5 MILLIGRAM(S): 5 TABLET ORAL at 08:00

## 2018-03-09 RX ADMIN — Medication 1000 MILLIGRAM(S): at 09:15

## 2018-03-09 RX ADMIN — SENNA PLUS 2 TABLET(S): 8.6 TABLET ORAL at 21:42

## 2018-03-09 RX ADMIN — PIPERACILLIN AND TAZOBACTAM 25 GRAM(S): 4; .5 INJECTION, POWDER, LYOPHILIZED, FOR SOLUTION INTRAVENOUS at 11:41

## 2018-03-09 RX ADMIN — LEVALBUTEROL 0.63 MILLIGRAM(S): 1.25 SOLUTION, CONCENTRATE RESPIRATORY (INHALATION) at 09:40

## 2018-03-09 RX ADMIN — HEPARIN SODIUM 5000 UNIT(S): 5000 INJECTION INTRAVENOUS; SUBCUTANEOUS at 21:37

## 2018-03-09 RX ADMIN — Medication 100 MILLIGRAM(S): at 21:37

## 2018-03-09 RX ADMIN — Medication 8 UNIT(S): at 17:33

## 2018-03-09 RX ADMIN — OXYCODONE HYDROCHLORIDE 10 MILLIGRAM(S): 5 TABLET ORAL at 14:34

## 2018-03-09 RX ADMIN — Medication 2: at 07:54

## 2018-03-09 RX ADMIN — ATORVASTATIN CALCIUM 10 MILLIGRAM(S): 80 TABLET, FILM COATED ORAL at 21:37

## 2018-03-09 RX ADMIN — OXYCODONE HYDROCHLORIDE 10 MILLIGRAM(S): 5 TABLET ORAL at 22:24

## 2018-03-09 RX ADMIN — TAMSULOSIN HYDROCHLORIDE 0.4 MILLIGRAM(S): 0.4 CAPSULE ORAL at 21:37

## 2018-03-09 RX ADMIN — OXYCODONE HYDROCHLORIDE 10 MILLIGRAM(S): 5 TABLET ORAL at 15:11

## 2018-03-09 RX ADMIN — Medication 2: at 17:33

## 2018-03-09 RX ADMIN — PIPERACILLIN AND TAZOBACTAM 25 GRAM(S): 4; .5 INJECTION, POWDER, LYOPHILIZED, FOR SOLUTION INTRAVENOUS at 03:15

## 2018-03-09 RX ADMIN — OXYCODONE HYDROCHLORIDE 5 MILLIGRAM(S): 5 TABLET ORAL at 08:30

## 2018-03-09 RX ADMIN — LEVALBUTEROL 0.63 MILLIGRAM(S): 1.25 SOLUTION, CONCENTRATE RESPIRATORY (INHALATION) at 04:41

## 2018-03-09 RX ADMIN — HEPARIN SODIUM 5000 UNIT(S): 5000 INJECTION INTRAVENOUS; SUBCUTANEOUS at 13:14

## 2018-03-09 RX ADMIN — Medication 250 MILLIGRAM(S): at 05:03

## 2018-03-09 RX ADMIN — PIPERACILLIN AND TAZOBACTAM 25 GRAM(S): 4; .5 INJECTION, POWDER, LYOPHILIZED, FOR SOLUTION INTRAVENOUS at 18:45

## 2018-03-09 RX ADMIN — Medication 100 MILLIGRAM(S): at 06:55

## 2018-03-09 RX ADMIN — Medication 100 MILLIGRAM(S): at 13:14

## 2018-03-09 RX ADMIN — HEPARIN SODIUM 5000 UNIT(S): 5000 INJECTION INTRAVENOUS; SUBCUTANEOUS at 06:55

## 2018-03-09 RX ADMIN — OXYCODONE HYDROCHLORIDE 10 MILLIGRAM(S): 5 TABLET ORAL at 21:37

## 2018-03-09 RX ADMIN — LORATADINE 10 MILLIGRAM(S): 10 TABLET ORAL at 11:40

## 2018-03-09 RX ADMIN — Medication 40 MILLIGRAM(S): at 06:55

## 2018-03-09 RX ADMIN — LEVALBUTEROL 0.63 MILLIGRAM(S): 1.25 SOLUTION, CONCENTRATE RESPIRATORY (INHALATION) at 21:51

## 2018-03-09 RX ADMIN — Medication 8: at 11:41

## 2018-03-09 RX ADMIN — Medication 400 MILLIGRAM(S): at 09:00

## 2018-03-09 RX ADMIN — LEVALBUTEROL 0.63 MILLIGRAM(S): 1.25 SOLUTION, CONCENTRATE RESPIRATORY (INHALATION) at 16:11

## 2018-03-09 RX ADMIN — BUDESONIDE AND FORMOTEROL FUMARATE DIHYDRATE 2 PUFF(S): 160; 4.5 AEROSOL RESPIRATORY (INHALATION) at 09:44

## 2018-03-09 RX ADMIN — Medication 250 MILLIGRAM(S): at 17:43

## 2018-03-09 NOTE — PROGRESS NOTE ADULT - SUBJECTIVE AND OBJECTIVE BOX
MORENITA BARRIENTOS          MRN-[id number]    HPI:  72 year old male presents to presurgical testing with diagnosis of other specific abnormal finding of lung field scheduled for navigational bronchoscopy biopsy right video assisted thoracoscopy, robotic assisted right lower lobectomy for 2/28/18. Pt reports fever and cough in 2/2018 treated with antibiotics, with Abnormal CXR. Pt followed up with CT chest which revealed a right lower lung mass. Pt denies SOB with exertion. Reports cough has resolved. (23 Feb 2018 13:36)      Procedure:  POD # :     Issues:        Interval/Overnight Events/ ROS  Pt remained hemodynamically stable overnight, not on any pressors or inotropes. OOB to chair, breathing comfortably with minimal pain. Ambulated several times . Denies pain, no SOB, no palpitations, no nausea/ no vomiting, no dizziness  A-line and yun d/samuel         PAST MEDICAL & SURGICAL HISTORY:  Impaired glucose tolerance: on diet control  Other nonspecific abnormal finding of lung field  DALIA on CPAP  TB (tuberculosis): age 20:  Treated with meds X 1year  Fatty liver  Renal calculi: &#x27; 2005:  passed  BPH (Benign Prostatic Hyperplasia)  Asthma: mild  Elevated triglycerides with high cholesterol  Foot fracture, left: &quot; 90&#x27;s  History of prostate surgery: greenlight laser 2013  H/O left inguinal hernia repair  Status post cataract extraction: bilateral  Foot fracture, left: &#x27; 90&#x27;s:  ORIF    Allergies    Cherries: Cough (Other)  dust (Sneezing)  No Known Drug Allergies  shellfish (Other)  strawberry (Other)    Intolerances            ***VITAL SIGNS:  Vital Signs Last 24 Hrs  T(C): 36.2 (09 Mar 2018 12:00), Max: 36.6 (09 Mar 2018 00:00)  T(F): 97.2 (09 Mar 2018 12:00), Max: 97.8 (09 Mar 2018 00:00)  HR: 78 (09 Mar 2018 13:00) (76 - 101)  BP: 111/61 (09 Mar 2018 13:00) (106/63 - 145/74)  BP(mean): 72 (09 Mar 2018 13:00) (72 - 100)  RR: 13 (09 Mar 2018 13:00) (12 - 23)  SpO2: 95% (09 Mar 2018 13:00) (92% - 100%)    I/Os:   I&O's Detail    08 Mar 2018 07:01  -  09 Mar 2018 07:00  --------------------------------------------------------  IN:    IV PiggyBack: 900 mL    Oral Fluid: 1680 mL  Total IN: 2580 mL    OUT:    Voided: 1000 mL  Total OUT: 1000 mL    Total NET: 1580 mL      09 Mar 2018 07:01  -  09 Mar 2018 14:25  --------------------------------------------------------  IN:    IV PiggyBack: 200 mL    Oral Fluid: 480 mL  Total IN: 680 mL    OUT:    Voided: 550 mL  Total OUT: 550 mL    Total NET: 130 mL          CAPILLARY BLOOD GLUCOSE      POCT Blood Glucose.: 308 mg/dL (09 Mar 2018 11:27)  POCT Blood Glucose.: 178 mg/dL (09 Mar 2018 07:47)  POCT Blood Glucose.: 189 mg/dL (08 Mar 2018 16:49)      =======================  MEDICATIONS  ===================  MEDICATIONS  (STANDING):  atorvastatin 10 milliGRAM(s) Oral at bedtime  buDESOnide 160 MICROgram(s)/formoterol 4.5 MICROgram(s) Inhaler 2 Puff(s) Inhalation two times a day  dextrose 5%. 1000 milliLiter(s) (50 mL/Hr) IV Continuous <Continuous>  dextrose 50% Injectable 12.5 Gram(s) IV Push once  dextrose 50% Injectable 25 Gram(s) IV Push once  dextrose 50% Injectable 25 Gram(s) IV Push once  diltiazem    Tablet 30 milliGRAM(s) Oral every 8 hours  docusate sodium 100 milliGRAM(s) Oral three times a day  heparin  Injectable 5000 Unit(s) SubCutaneous every 8 hours  insulin lispro (HumaLOG) corrective regimen sliding scale   SubCutaneous three times a day before meals  levalbuterol Inhalation 0.63 milliGRAM(s) Inhalation every 6 hours  loratadine 10 milliGRAM(s) Oral daily  methylPREDNISolone sodium succinate Injectable 40 milliGRAM(s) IV Push every 12 hours  pantoprazole    Tablet 40 milliGRAM(s) Oral before breakfast  piperacillin/tazobactam IVPB. 3.375 Gram(s) IV Intermittent every 8 hours  senna 2 Tablet(s) Oral at bedtime  tamsulosin 0.4 milliGRAM(s) Oral at bedtime  vancomycin  IVPB 1000 milliGRAM(s) IV Intermittent every 12 hours    MEDICATIONS  (PRN):  acetaminophen   Tablet. 650 milliGRAM(s) Oral every 6 hours PRN Mild Pain (1 - 3)  ALBUTerol    90 MICROgram(s) HFA Inhaler 2 Puff(s) Inhalation every 4 hours PRN Shortness of Breath  benzocaine 15 mG/menthol 3.6 mG Lozenge 1 Lozenge Oral every 4 hours PRN Sore Throat  bisacodyl Suppository 10 milliGRAM(s) Rectal daily PRN Constipation  dextrose Gel 1 Dose(s) Oral once PRN Blood Glucose LESS THAN 70 milliGRAM(s)/deciliter  glucagon  Injectable 1 milliGRAM(s) IntraMuscular once PRN Glucose LESS THAN 70 milligrams/deciliter  guaiFENesin    Syrup 100 milliGRAM(s) Oral every 6 hours PRN Cough  oxyCODONE    IR 5 milliGRAM(s) Oral every 4 hours PRN Moderate Pain (4 - 6)  oxyCODONE    IR 10 milliGRAM(s) Oral every 4 hours PRN Severe Pain (7 - 10)  polyethylene glycol 3350 17 Gram(s) Oral daily PRN Constipation      ======================VENTILATOR SETTINGS  ==============      =================== PATIENT CARE ACCESS DEVICES ==========  Peripheral IV  Central Venous Line	R	L	IJ	Fem	SC			Placed:   Arterial Line	R	L	PT	DP	Fem	Rad	Ax	Placed:   Midline:				  Urinary Catheter, Date Placed:   Necessity of urinary, arterial, and venous catheters discussed    ======================= PHYSICAL EXAM===================  General:                         Comfortable, Awake, alert, not in any distress  Neuro:                            Moving all extremities to commands. No focal deficits	  HEENT:                           ROCKY/ ETT/ NGT/ trach  Respiratory:	Lungs clear on auscultation bilaterally with good aeration.                                           No rales, rhonchi, no wheezing. Effort even and unlabored.  CV:		Regular rate and rhythm. Normal S1/S2. No murmurs  Abdomen:	                     Soft,  nontender, not-distended. Bowel sounds present / absent.   Skin:		No rash.  Extremities:	Warm, no cyanosis or edema.  Palpable pulses    ============================ LABS =======================                        13.6   17.33 )-----------( 251      ( 09 Mar 2018 04:30 )             41.0     03-09    140  |  104  |  24<H>  ----------------------------<  194<H>  4.6   |  21<L>  |  0.79    Ca    7.9<L>      09 Mar 2018 04:30  Phos  3.6     03-09  Mg     2.7     03-09    TPro  6.6  /  Alb  3.2<L>  /  TBili  0.5  /  DBili  x   /  AST  29  /  ALT  27  /  AlkPhos  51  03-09    LIVER FUNCTIONS - ( 09 Mar 2018 04:30 )  Alb: 3.2 g/dL / Pro: 6.6 g/dL / ALK PHOS: 51 u/L / ALT: 27 u/L / AST: 29 u/L / GGT: x                 SPUTUM  03-07-18 --  --  --      BLOOD PERIPHERAL  03-07-18 --  --  --      BRONCHIAL LAVAGE  02-28-18 --  --  --          ===================== IMAGING STUDIES ===================  Radiology personally reviewed.    ====================ASSESSMENT AND PLAN ================      ====================== NEUROLOGY=======================  Pain control with PCA / PCEA / Tylenol IV / Toradol / Percocet  Pt is on Precedex for agitation  Pt is sedated with Propofol / Fentanyl    ==================== RESPIRATORY========================  Pt is on            L nasal canula / Face tent____% FiO2  Comfortable, no evidence of distress.  Using incentive spirometry & doing                ml  Monitor chest tube output  Chest tube to suction / water seal	    Mechanical Ventilation:    Mechanical ventilator status assessed & settings reviewed  Continue bronchodilators, pulmonary toilet  Head of bed elevation to 30-40 degrees    ====================CARDIOVASCULAR=====================  Continue hemodynamic monitoring/ telemetry  Not on any pressors  Continue cardiovascular / antihypertensive medications    ===================== RENAL ============================  Continue LR 30CC/hr      D/C IVF  Monitor I/Os, BUN/ Cr  and electrolytes  D/C Yun      Keep Yun  BPH: Continue Flomax/ Finasteride      ==================== GASTROINTESTINAL===================  On regular diet, tolerating well  Continue GI prophylaxis with Pepcid / Protonix  Continue Zofran / Reglan for nausea - PRN	  NPO    =======================    ENDOCRIN  =====================  Glycemic monitoring  F/S with coverage  ===================HEMATOLOGIC/ONCOLOGIC =============  Monitor chest tube output. No signs of active bleeding.   Follow CBC, coags  in AM  DVT prophylaxis with SCD, sc Heparin    ========================INFECTIOUS DISEASE===============  No signs of infection. Monitor for fever / leukocytosis.  All surgical incision / chest tube  sites look clean  D/C Yun      Pertinent clinical, laboratory, radiographic, hemodynamic, echocardiographic, respiratory data, microbiologic data and chart were reviewed and analyzed frequently throughout the course of the day and night. GI and DVT prophylaxis, glycemic control, head of bed elevation and skin care issues were addressed.  Patient seen, examined and plan discussed with CT Surgery / CTICU team during rounds.  Pt remains critically ill in imminent risk of  deterioration and requires very careful cardio- pulmonary monitoring and support.    I have spent               minutes of critical care time with this pt between            am/pm    and               am/ pm         minutes spent on total encounter; more than 50% of the visit was spent counseling and/or coordinating care by the attending physician.        JOHN Johnston MD MORENITA BARRIENTOS          MRN-[id number]    HPI  72 M PMH mild Asthma  mild, BPH, triglyceridemia, fatty liver, DALIA on CPAP, renal calculi, TB w SPN  The patient had a fever & cough w an abnormal CXR in Feb 2018, treated w ABXs. Follow up CT scan revealed a right lower lung mass.    Procedure 02/28/18: Navigational bronchoscopy, VATS, RLL lobectomy, MLND, FOB    Issues:  s/p surgery               Post op pain  	Hx DALIA, HLD, BPH, Asthma  	    The patient developed worsening SOB, SQ air, nasal voice and a RLL infiltrate.  Pan-cultures were obtained and empiric broad spectrum ABXs were started.  The pt was transferred to CT ICU for closer observation.          Interval/Overnight Events/ ROS  Pt remained hemodynamically stable overnight, not on any pressors or inotropes. OOB to chair, breathing comfortably. Ambulated several times . Denies pain, no SOB, no palpitations, no nausea/ no vomiting, no dizziness          PAST MEDICAL & SURGICAL HISTORY:  Impaired glucose tolerance: on diet control  Other nonspecific abnormal finding of lung field  DALIA on CPAP  TB (tuberculosis): age 20:  Treated with meds X 1year  Fatty liver  Renal calculi: &#x27; 2005:  passed  BPH (Benign Prostatic Hyperplasia)  Asthma: mild  Elevated triglycerides with high cholesterol  Foot fracture, left: &quot; 90&#x27;s  History of prostate surgery: greenlight laser 2013  H/O left inguinal hernia repair  Status post cataract extraction: bilateral  Foot fracture, left: &#x27; 90&#x27;s:  ORIF          Home Medications:   * Incomplete Medication History as of 23-Feb-2018 13:42 documented in Structured Notes  · 	atorvastatin 10 mg oral tablet: Last Dose Taken:  , 1 tab(s) orally once a day, pm  · 	multivitamin: Last Dose Taken:  , 1 tab(s) orally once a day, last dose 2/23/18  · 	Vitamin D 3: Last Dose Taken:  , 1000 unit(s) orally once a day, last dose 2/23/18  · 	loratadine 10 mg oral tablet: Last Dose Taken:  , 1 tab(s) orally once a day, pm  · 	Omega 3 acid: Last Dose Taken:  , 1  orally 2 times a day, last dose 2/23/18  · 	Advair Diskus 250 mcg-50 mcg inhalation powder: Last Dose Taken:  , 1 puff(s) inhaled 2 times a day          · 	fluticasone 50 mcg inhalation powder: Last Dose Taken:  , 1 puff(s) inhaled 2 times a day        Allergies  Cherries: Cough (Other)  dust (Sneezing)  No Known Drug Allergies  shellfish (Other)  strawberry (Other)        ***VITAL SIGNS:  Vital Signs Last 24 Hrs  T(C): 36.2 (09 Mar 2018 12:00), Max: 36.6 (09 Mar 2018 00:00)  T(F): 97.2 (09 Mar 2018 12:00), Max: 97.8 (09 Mar 2018 00:00)  HR: 78 (09 Mar 2018 13:00) (76 - 101)  BP: 111/61 (09 Mar 2018 13:00) (106/63 - 145/74)  BP(mean): 72 (09 Mar 2018 13:00) (72 - 100)  RR: 13 (09 Mar 2018 13:00) (12 - 23)  SpO2: 95% (09 Mar 2018 13:00) (92% - 100%)          I/Os:   I&O's Detail    08 Mar 2018 07:01  -  09 Mar 2018 07:00  --------------------------------------------------------  IN:    IV PiggyBack: 900 mL    Oral Fluid: 1680 mL  Total IN: 2580 mL    OUT:    Voided: 1000 mL  Total OUT: 1000 mL    Total NET: 1580 mL      09 Mar 2018 07:01  -  09 Mar 2018 14:25  --------------------------------------------------------  IN:    IV PiggyBack: 200 mL    Oral Fluid: 480 mL  Total IN: 680 mL    OUT:    Voided: 550 mL  Total OUT: 550 mL    Total NET: 130 mL        CAPILLARY BLOOD GLUCOSE  POCT Blood Glucose.: 308 mg/dL (09 Mar 2018 11:27)  POCT Blood Glucose.: 178 mg/dL (09 Mar 2018 07:47)  POCT Blood Glucose.: 189 mg/dL (08 Mar 2018 16:49)      =======================  MEDICATIONS  ===================  MEDICATIONS  (STANDING):  atorvastatin 10 milliGRAM(s) Oral at bedtime  buDESOnide 160 MICROgram(s)/formoterol 4.5 MICROgram(s) Inhaler 2 Puff(s) Inhalation two times a day  dextrose 5%. 1000 milliLiter(s) (50 mL/Hr) IV Continuous  dextrose 50% Injectable 12.5 Gram(s) IV Push once  dextrose 50% Injectable 25 Gram(s) IV Push once  dextrose 50% Injectable 25 Gram(s) IV Push once  diltiazem    Tablet 30 milliGRAM(s) Oral every 8 hours  docusate sodium 100 milliGRAM(s) Oral three times a day  heparin  Injectable 5000 Unit(s) SubCutaneous every 8 hours  insulin lispro (HumaLOG) corrective regimen sliding scale   SubCutaneous three times a day before meals  levalbuterol Inhalation 0.63 milliGRAM(s) Inhalation every 6 hours  loratadine 10 milliGRAM(s) Oral daily  methylPREDNISolone sodium succinate Injectable 40 milliGRAM(s) IV Push every 12 hours  pantoprazole    Tablet 40 milliGRAM(s) Oral before breakfast  piperacillin/tazobactam IVPB. 3.375 Gram(s) IV Intermittent every 8 hours  senna 2 Tablet(s) Oral at bedtime  tamsulosin 0.4 milliGRAM(s) Oral at bedtime  vancomycin  IVPB 1000 milliGRAM(s) IV Intermittent every 12 hours    MEDICATIONS  (PRN):  acetaminophen   Tablet. 650 milliGRAM(s) Oral every 6 hours PRN Mild Pain (1 - 3)  ALBUTerol    90 MICROgram(s) HFA Inhaler 2 Puff(s) Inhalation every 4 hours PRN Shortness of Breath  benzocaine 15 mG/menthol 3.6 mG Lozenge 1 Lozenge Oral every 4 hours PRN Sore Throat  bisacodyl Suppository 10 milliGRAM(s) Rectal daily PRN Constipation  dextrose Gel 1 Dose(s) Oral once PRN Blood Glucose LESS THAN 70 milliGRAM(s)/deciliter  glucagon  Injectable 1 milliGRAM(s) IntraMuscular once PRN Glucose LESS THAN 70 milligrams/deciliter  guaiFENesin    Syrup 100 milliGRAM(s) Oral every 6 hours PRN Cough  oxyCODONE    IR 5 milliGRAM(s) Oral every 4 hours PRN Moderate Pain (4 - 6)  oxyCODONE    IR 10 milliGRAM(s) Oral every 4 hours PRN Severe Pain (7 - 10)  polyethylene glycol 3350 17 Gram(s) Oral daily PRN Constipation            =================== PATIENT CARE ACCESS DEVICES ==========  Peripheral IV (+)       ======================= PHYSICAL EXAM===================  General:            Comfortable, Awake, alert, not in any distress  Neuro:             Moving all extremities to commands. No focal deficits	  HEENT:                           ROCKY  Respiratory:	Lungs clear on auscultation bilaterally with good aeration.                           No rales, rhonchi, no wheezing. Effort even and unlabored.                          Minimal subcutaneus emphysema over chest wall  CV:		Regular rate and rhythm. Normal S1/S2. No murmurs  Abdomen:	Soft,  nontender, not-distended. Bowel sounds present   Skin:		No rash.  Extremities:	Warm, no cyanosis or edema.  Palpable pulses    ============================ LABS =======================                        13.6   17.33 )-----------( 251      ( 09 Mar 2018 04:30 )             41.0     03-09    140  |  104       |  24<H>  ----------------------------<  194<H>  4.6   |  21<L>  |  0.79    Ca    7.9<L>      09 Mar 2018 04:30  Phos  3.6     03-09  Mg     2.7     03-09    TPro  6.6  /  Alb  3.2<L>  /  TBili  0.5  /  DBili  x   /  AST  29  /  ALT  27  /  AlkPhos  51  03-09    LIVER FUNCTIONS - ( 09 Mar 2018 04:30 )  Alb: 3.2 g/dL / Pro: 6.6 g/dL / ALK PHOS: 51 u/L / ALT: 27 u/L / AST: 29 u/L / GGT: x                 SPUTUM  03-07-18 --  --  --      BLOOD PERIPHERAL  03-07-18 --  --  --      BRONCHIAL LAVAGE  02-28-18 --  --  --          ===================== IMAGING STUDIES ===================  Radiology personally reviewed.      < from: Xray Chest 1 View- PORTABLE-Routine (03.09.18 @ 07:20) >    EXAM:  XR CHEST PORTABLE ROUTINE 1V    PROCEDURE DATE:  Mar  9 2018     INTERPRETATION:    Portable chest xray: r/o I/E  Comparison: Most recent prior   FINDINGS:  Lines/Tubes: None  Heart and mediastinum:  Unchanged.  Lungs, pleura, and airways: Bibasilar atelectasis and/or consolidations   are unchanged. No definite pneumothorax    Bones and soft tissues: The bones and soft tissues are unchanged.   Bilateral subcutaneous emphysema is unchanged    Impression:    Unchanged bilateral subcutaneous emphysema    Bibasilar atelectasis and/or consolidations, unchanged. No definite   pneumothorax    < end of copied text >    ====================ASSESSMENT AND PLAN ================  72 M PMH mild Asthma  mild, BPH, triglyceridemia, fatty liver, DALIA on CPAP, renal calculi, TB w SPN  The patient had a fever & cough w an abnormal CXR in Feb 2018, treated w ABXs. Follow up CT scan revealed a right lower lung mass.    Procedure 02/28/18: Navigational bronchoscopy, VATS, RLL lobectomy, MLND, FOB    Issues:  s/p surgery               Post op pain              hyperglycemia - likely due to steroids  	Hx DALIA, HLD, BPH, Asthma    ====================== NEUROLOGY=======================  Pain control with  Tylenol / Oxycodone PRN      ==================== RESPIRATORY========================  Pt is on   2         L nasal canula  Comfortable, no evidence of distress.  Using incentive spirometry   Continue bronchodilators, pulmonary toilet  buDESOnide 160 MICROgram(s)/formoterol 4.5 MICROgram(s) Inhaler 2 Puff(s) Inhalation two times a day  levalbuterol Inhalation 0.63 milliGRAM(s) Inhalation every 6 hours  loratadine 10 milliGRAM(s) Oral daily  Tapering Solumedrol down to 40 mg Q12 today, likely quick taper over next 2-3 days if resp status is stable  Head of bed elevation to 30-40 degrees  OOB, ambulation    ====================CARDIOVASCULAR=====================  Continue hemodynamic monitoring/ telemetry  Not on any pressors  Continue cardiovascular / antihypertensive medications  diltiazem    Tablet 30 milliGRAM(s) Oral every 8 hours  ===================== RENAL ============================  D/C IVF  Monitor I/Os, BUN/ Cr  and electrolytes  BPH: Continue Flomax    ==================== GASTROINTESTINAL===================  On DASH  diet, tolerating well  Continue GI prophylaxis with  Protonix  Continue Zofran / Reglan for nausea - PRN	      =======================    ENDOCRIN  =====================  Glycemic monitoring  F/S with coverage and Lispro with meals    ===================HEMATOLOGIC/ONCOLOGIC =============  Monitor chest tube output. No signs of active bleeding.   Follow CBC, coags  in AM  DVT prophylaxis with SCD, sc Heparin    ========================INFECTIOUS DISEASE===============  No signs of infection. Monitor for fever / leukocytosis.  All surgical incision  sites look clean      Pertinent clinical, laboratory, radiographic, hemodynamic, echocardiographic, respiratory data, microbiologic data and chart were reviewed and analyzed frequently throughout the course of the day and night. GI and DVT prophylaxis, glycemic control, head of bed elevation and skin care issues were addressed.  Patient seen, examined and plan discussed with CT Surgery / CTICU team during rounds.  Pt remains critically ill in imminent risk of  deterioration and requires very careful cardio- pulmonary monitoring and support.    I have spent      30         minutes  with this pt between  7     am    and   2  pm         minutes spent on total encounter; more than 50% of the visit was spent counseling and/or coordinating care by the attending physician.        JOHN Johnston MD MORENITA BARRIENTOS          [id number]    HPI  72 M PMH mild Asthma  mild, BPH, triglyceridemia, fatty liver, DALIA on CPAP, renal calculi, TB w SPN  The patient had a fever & cough w an abnormal CXR in Feb 2018, treated w ABXs. Follow up CT scan revealed a right lower lung mass.    Procedure 02/28/18: Navigational bronchoscopy, VATS, RLL lobectomy, MLND, FOB    Issues:  s/p surgery               Post op pain  	Hx DALIA, HLD, BPH, Asthma  	    The patient developed worsening SOB, SQ air, nasal voice and a RLL infiltrate.  Pan-cultures were obtained and empiric broad spectrum ABXs were started.  The pt was transferred to CT ICU for closer observation.      Interval/Overnight Events/ ROS  Pt remained hemodynamically stable overnight, not on any pressors or inotropes. OOB to chair, breathing comfortably. Ambulated several times . Denies pain, no SOB, no palpitations, no nausea/ no vomiting, no dizziness      PAST MEDICAL & SURGICAL HISTORY:  Impaired glucose tolerance: on diet control  Other nonspecific abnormal finding of lung field  DALIA on CPAP  TB (tuberculosis): age 20:  Treated with meds X 1year  Fatty liver  Renal calculi: &#x27; 2005:  passed  BPH (Benign Prostatic Hyperplasia)  Asthma: mild  Elevated triglycerides with high cholesterol  Foot fracture, left: &quot; 90&#x27;s  History of prostate surgery: greenlight laser 2013  H/O left inguinal hernia repair  Status post cataract extraction: bilateral  Foot fracture, left: &#x27; 90&#x27;s:  ORIF          Home Medications:   * Incomplete Medication History as of 23-Feb-2018 13:42 documented in Structured Notes  · 	atorvastatin 10 mg oral tablet: Last Dose Taken:  , 1 tab(s) orally once a day, pm  · 	multivitamin: Last Dose Taken:  , 1 tab(s) orally once a day, last dose 2/23/18  · 	Vitamin D 3: Last Dose Taken:  , 1000 unit(s) orally once a day, last dose 2/23/18  · 	loratadine 10 mg oral tablet: Last Dose Taken:  , 1 tab(s) orally once a day, pm  · 	Omega 3 acid: Last Dose Taken:  , 1  orally 2 times a day, last dose 2/23/18  · 	Advair Diskus 250 mcg-50 mcg inhalation powder: Last Dose Taken:  , 1 puff(s) inhaled 2 times a day          · 	fluticasone 50 mcg inhalation powder: Last Dose Taken:  , 1 puff(s) inhaled 2 times a day        Allergies  Cherries: Cough (Other)  dust (Sneezing)  No Known Drug Allergies  shellfish (Other)  strawberry (Other)        ***VITAL SIGNS:  Vital Signs Last 24 Hrs  T(C): 36.2 (09 Mar 2018 12:00), Max: 36.6 (09 Mar 2018 00:00)  T(F): 97.2 (09 Mar 2018 12:00), Max: 97.8 (09 Mar 2018 00:00)  HR: 78 (09 Mar 2018 13:00) (76 - 101)  BP: 111/61 (09 Mar 2018 13:00) (106/63 - 145/74)  BP(mean): 72 (09 Mar 2018 13:00) (72 - 100)  RR: 13 (09 Mar 2018 13:00) (12 - 23)  SpO2: 95% (09 Mar 2018 13:00) (92% - 100%)          I/Os:   I&O's Detail    08 Mar 2018 07:01  -  09 Mar 2018 07:00  --------------------------------------------------------  IN:    IV PiggyBack: 900 mL    Oral Fluid: 1680 mL  Total IN: 2580 mL    OUT:    Voided: 1000 mL  Total OUT: 1000 mL    Total NET: 1580 mL      09 Mar 2018 07:01  -  09 Mar 2018 14:25  --------------------------------------------------------  IN:    IV PiggyBack: 200 mL    Oral Fluid: 480 mL  Total IN: 680 mL    OUT:    Voided: 550 mL  Total OUT: 550 mL    Total NET: 130 mL      CAPILLARY BLOOD GLUCOSE  POCT Blood Glucose.: 308 mg/dL (09 Mar 2018 11:27)  POCT Blood Glucose.: 178 mg/dL (09 Mar 2018 07:47)  POCT Blood Glucose.: 189 mg/dL (08 Mar 2018 16:49)      =======================  MEDICATIONS  ===================  MEDICATIONS  (STANDING):  atorvastatin 10 milliGRAM(s) Oral at bedtime  buDESOnide 160 MICROgram(s)/formoterol 4.5 MICROgram(s) Inhaler 2 Puff(s) Inhalation two times a day  dextrose 5%. 1000 milliLiter(s) (50 mL/Hr) IV Continuous  dextrose 50% Injectable 12.5 Gram(s) IV Push once  dextrose 50% Injectable 25 Gram(s) IV Push once  dextrose 50% Injectable 25 Gram(s) IV Push once  diltiazem    Tablet 30 milliGRAM(s) Oral every 8 hours  docusate sodium 100 milliGRAM(s) Oral three times a day  heparin  Injectable 5000 Unit(s) SubCutaneous every 8 hours  insulin lispro (HumaLOG) corrective regimen sliding scale   SubCutaneous three times a day before meals  levalbuterol Inhalation 0.63 milliGRAM(s) Inhalation every 6 hours  loratadine 10 milliGRAM(s) Oral daily  methylPREDNISolone sodium succinate Injectable 40 milliGRAM(s) IV Push every 12 hours  pantoprazole    Tablet 40 milliGRAM(s) Oral before breakfast  piperacillin/tazobactam IVPB. 3.375 Gram(s) IV Intermittent every 8 hours  senna 2 Tablet(s) Oral at bedtime  tamsulosin 0.4 milliGRAM(s) Oral at bedtime  vancomycin  IVPB 1000 milliGRAM(s) IV Intermittent every 12 hours    MEDICATIONS  (PRN):  acetaminophen   Tablet. 650 milliGRAM(s) Oral every 6 hours PRN Mild Pain (1 - 3)  ALBUTerol    90 MICROgram(s) HFA Inhaler 2 Puff(s) Inhalation every 4 hours PRN Shortness of Breath  benzocaine 15 mG/menthol 3.6 mG Lozenge 1 Lozenge Oral every 4 hours PRN Sore Throat  bisacodyl Suppository 10 milliGRAM(s) Rectal daily PRN Constipation  dextrose Gel 1 Dose(s) Oral once PRN Blood Glucose LESS THAN 70 milliGRAM(s)/deciliter  glucagon  Injectable 1 milliGRAM(s) IntraMuscular once PRN Glucose LESS THAN 70 milligrams/deciliter  guaiFENesin    Syrup 100 milliGRAM(s) Oral every 6 hours PRN Cough  oxyCODONE    IR 5 milliGRAM(s) Oral every 4 hours PRN Moderate Pain (4 - 6)  oxyCODONE    IR 10 milliGRAM(s) Oral every 4 hours PRN Severe Pain (7 - 10)  polyethylene glycol 3350 17 Gram(s) Oral daily PRN Constipation      =================== PATIENT CARE ACCESS DEVICES ==========  Peripheral IV (+)     ======================= PHYSICAL EXAM===================  General:            Comfortable, Awake, alert, not in any distress  Neuro:             Moving all extremities to commands. No focal deficits	  HEENT:                           ROCKY  Respiratory:	Lungs clear on auscultation bilaterally with good aeration.                           No rales, rhonchi, no wheezing. Effort even and unlabored.                          Minimal subcutaneus emphysema over chest wall  CV:		Regular rate and rhythm. Normal S1/S2. No murmurs  Abdomen:	Soft,  nontender, not-distended. Bowel sounds present   Skin:		No rash.  Extremities:	Warm, no cyanosis or edema.  Palpable pulses    ============================ LABS =======================                        13.6   17.33 )-----------( 251      ( 09 Mar 2018 04:30 )             41.0     03-09    140  |  104       |  24<H>  ----------------------------<  194<H>  4.6   |  21<L>  |  0.79    Ca    7.9<L>      09 Mar 2018 04:30  Phos  3.6     03-09  Mg     2.7     03-09    TPro  6.6  /  Alb  3.2<L>  /  TBili  0.5  /  DBili  x   /  AST  29  /  ALT  27  /  AlkPhos  51  03-09    LIVER FUNCTIONS - ( 09 Mar 2018 04:30 )  Alb: 3.2 g/dL / Pro: 6.6 g/dL / ALK PHOS: 51 u/L / ALT: 27 u/L / AST: 29 u/L / GGT: x               SPUTUM  03-07-18 --  --  --      BLOOD PERIPHERAL  03-07-18 --  --  --      BRONCHIAL LAVAGE  02-28-18 --  --  --      ===================== IMAGING STUDIES ===================  Radiology personally reviewed.      < from: Xray Chest 1 View- PORTABLE-Routine (03.09.18 @ 07:20) >    EXAM:  XR CHEST PORTABLE ROUTINE 1V    PROCEDURE DATE:  Mar  9 2018     INTERPRETATION:    Portable chest xray: r/o I/E  Comparison: Most recent prior   FINDINGS:  Lines/Tubes: None  Heart and mediastinum:  Unchanged.  Lungs, pleura, and airways: Bibasilar atelectasis and/or consolidations   are unchanged. No definite pneumothorax    Bones and soft tissues: The bones and soft tissues are unchanged.   Bilateral subcutaneous emphysema is unchanged    Impression:    Unchanged bilateral subcutaneous emphysema    Bibasilar atelectasis and/or consolidations, unchanged. No definite   pneumothorax    < end of copied text >    ====================ASSESSMENT AND PLAN ================  72 M PMH mild Asthma  mild, BPH, triglyceridemia, fatty liver, DALIA on CPAP, renal calculi, TB w SPN  The patient had a fever & cough w an abnormal CXR in Feb 2018, treated w ABXs. Follow up CT scan revealed a right lower lung mass.    Procedure 02/28/18: Navigational bronchoscopy, VATS, RLL lobectomy, MLND, FOB    Issues:  s/p surgery               Post op pain              hyperglycemia - likely due to steroids  	Hx DALIA, HLD, BPH, Asthma    ====================== NEUROLOGY=======================  Pain control with  Tylenol / Oxycodone PRN      ==================== RESPIRATORY========================  Pt is on   2         L nasal canula  Comfortable, no evidence of distress.  Using incentive spirometry   Continue bronchodilators, pulmonary toilet  buDESOnide 160 MICROgram(s)/formoterol 4.5 MICROgram(s) Inhaler 2 Puff(s) Inhalation two times a day  levalbuterol Inhalation 0.63 milliGRAM(s) Inhalation every 6 hours  loratadine 10 milliGRAM(s) Oral daily  Tapering Solumedrol down to 40 mg Q12 today, likely quick taper over next 2-3 days if resp status is stable  Head of bed elevation to 30-40 degrees  OOB, ambulation    ====================CARDIOVASCULAR=====================  Continue hemodynamic monitoring/ telemetry  Not on any pressors  Continue cardiovascular / antihypertensive medications  diltiazem    Tablet 30 milliGRAM(s) Oral every 8 hours  ===================== RENAL ============================  D/C IVF  Monitor I/Os, BUN/ Cr  and electrolytes  BPH: Continue Flomax    ==================== GASTROINTESTINAL===================  On DASH  diet, tolerating well  Continue GI prophylaxis with  Protonix  Continue Zofran / Reglan for nausea - PRN	    =======================    ENDOCRIN  =====================  Glycemic monitoring  F/S with coverage and Lispro with meals    ===================HEMATOLOGIC/ONCOLOGIC =============  Monitor chest tube output. No signs of active bleeding.   Follow CBC, coags  in AM  DVT prophylaxis with SCD, sc Heparin    ========================INFECTIOUS DISEASE===============  No signs of infection. Monitor for fever / leukocytosis.  All surgical incision  sites look clean      Pertinent clinical, laboratory, radiographic, hemodynamic, echocardiographic, respiratory data, microbiologic data and chart were reviewed and analyzed frequently throughout the course of the day and night. GI and DVT prophylaxis, glycemic control, head of bed elevation and skin care issues were addressed.  Patient seen, examined and plan discussed with CT Surgery / CTICU team during rounds.  Pt remains critically ill in imminent risk of  deterioration and requires very careful cardio- pulmonary monitoring and support.    I have spent      30         minutes  with this pt between  7     am    and   2  pm         minutes spent on total encounter; more than 50% of the visit was spent counseling and/or coordinating care by the attending physician.      JOHN Johnston MD

## 2018-03-10 LAB
-  CEFAZOLIN: SIGNIFICANT CHANGE UP
-  CIPROFLOXACIN: SIGNIFICANT CHANGE UP
-  CLINDAMYCIN: SIGNIFICANT CHANGE UP
-  ERYTHROMYCIN: SIGNIFICANT CHANGE UP
-  GENTAMICIN: SIGNIFICANT CHANGE UP
-  MOXIFLOXACIN(AEROBIC): SIGNIFICANT CHANGE UP
-  OXACILLIN: SIGNIFICANT CHANGE UP
-  PENICILLIN: SIGNIFICANT CHANGE UP
-  RIFAMPIN.: SIGNIFICANT CHANGE UP
-  TETRACYCLINE: SIGNIFICANT CHANGE UP
-  TRIMETHOPRIM/SULFAMETHOXAZOLE: SIGNIFICANT CHANGE UP
-  VANCOMYCIN: SIGNIFICANT CHANGE UP
BACTERIA SPT RESP CULT: SIGNIFICANT CHANGE UP
BUN SERPL-MCNC: 22 MG/DL — SIGNIFICANT CHANGE UP (ref 7–23)
CALCIUM SERPL-MCNC: 7.5 MG/DL — LOW (ref 8.4–10.5)
CHLORIDE SERPL-SCNC: 103 MMOL/L — SIGNIFICANT CHANGE UP (ref 98–107)
CO2 SERPL-SCNC: 22 MMOL/L — SIGNIFICANT CHANGE UP (ref 22–31)
CREAT SERPL-MCNC: 0.88 MG/DL — SIGNIFICANT CHANGE UP (ref 0.5–1.3)
GLUCOSE SERPL-MCNC: 139 MG/DL — HIGH (ref 70–99)
GRAM STN SPT: SIGNIFICANT CHANGE UP
HCT VFR BLD CALC: 39.9 % — SIGNIFICANT CHANGE UP (ref 39–50)
HGB BLD-MCNC: 13.2 G/DL — SIGNIFICANT CHANGE UP (ref 13–17)
MCHC RBC-ENTMCNC: 30.1 PG — SIGNIFICANT CHANGE UP (ref 27–34)
MCHC RBC-ENTMCNC: 33.1 % — SIGNIFICANT CHANGE UP (ref 32–36)
MCV RBC AUTO: 91.1 FL — SIGNIFICANT CHANGE UP (ref 80–100)
METHOD TYPE: SIGNIFICANT CHANGE UP
NRBC # FLD: 0 — SIGNIFICANT CHANGE UP
ORGANISM # SPEC MICROSCOPIC CNT: SIGNIFICANT CHANGE UP
ORGANISM # SPEC MICROSCOPIC CNT: SIGNIFICANT CHANGE UP
PLATELET # BLD AUTO: 241 K/UL — SIGNIFICANT CHANGE UP (ref 150–400)
PMV BLD: 10.6 FL — SIGNIFICANT CHANGE UP (ref 7–13)
POTASSIUM SERPL-MCNC: 3.9 MMOL/L — SIGNIFICANT CHANGE UP (ref 3.5–5.3)
POTASSIUM SERPL-SCNC: 3.9 MMOL/L — SIGNIFICANT CHANGE UP (ref 3.5–5.3)
RBC # BLD: 4.38 M/UL — SIGNIFICANT CHANGE UP (ref 4.2–5.8)
RBC # FLD: 13.4 % — SIGNIFICANT CHANGE UP (ref 10.3–14.5)
SODIUM SERPL-SCNC: 139 MMOL/L — SIGNIFICANT CHANGE UP (ref 135–145)
WBC # BLD: 18.77 K/UL — HIGH (ref 3.8–10.5)
WBC # FLD AUTO: 18.77 K/UL — HIGH (ref 3.8–10.5)

## 2018-03-10 PROCEDURE — 71045 X-RAY EXAM CHEST 1 VIEW: CPT | Mod: 26

## 2018-03-10 RX ADMIN — LEVALBUTEROL 0.63 MILLIGRAM(S): 1.25 SOLUTION, CONCENTRATE RESPIRATORY (INHALATION) at 15:10

## 2018-03-10 RX ADMIN — Medication 8 UNIT(S): at 17:32

## 2018-03-10 RX ADMIN — TAMSULOSIN HYDROCHLORIDE 0.4 MILLIGRAM(S): 0.4 CAPSULE ORAL at 21:54

## 2018-03-10 RX ADMIN — PIPERACILLIN AND TAZOBACTAM 25 GRAM(S): 4; .5 INJECTION, POWDER, LYOPHILIZED, FOR SOLUTION INTRAVENOUS at 22:01

## 2018-03-10 RX ADMIN — HEPARIN SODIUM 5000 UNIT(S): 5000 INJECTION INTRAVENOUS; SUBCUTANEOUS at 21:54

## 2018-03-10 RX ADMIN — PIPERACILLIN AND TAZOBACTAM 25 GRAM(S): 4; .5 INJECTION, POWDER, LYOPHILIZED, FOR SOLUTION INTRAVENOUS at 13:12

## 2018-03-10 RX ADMIN — PANTOPRAZOLE SODIUM 40 MILLIGRAM(S): 20 TABLET, DELAYED RELEASE ORAL at 06:30

## 2018-03-10 RX ADMIN — HEPARIN SODIUM 5000 UNIT(S): 5000 INJECTION INTRAVENOUS; SUBCUTANEOUS at 06:29

## 2018-03-10 RX ADMIN — Medication 250 MILLIGRAM(S): at 18:30

## 2018-03-10 RX ADMIN — OXYCODONE HYDROCHLORIDE 5 MILLIGRAM(S): 5 TABLET ORAL at 00:28

## 2018-03-10 RX ADMIN — PIPERACILLIN AND TAZOBACTAM 25 GRAM(S): 4; .5 INJECTION, POWDER, LYOPHILIZED, FOR SOLUTION INTRAVENOUS at 04:58

## 2018-03-10 RX ADMIN — Medication 650 MILLIGRAM(S): at 10:23

## 2018-03-10 RX ADMIN — OXYCODONE HYDROCHLORIDE 10 MILLIGRAM(S): 5 TABLET ORAL at 14:51

## 2018-03-10 RX ADMIN — LORATADINE 10 MILLIGRAM(S): 10 TABLET ORAL at 12:24

## 2018-03-10 RX ADMIN — OXYCODONE HYDROCHLORIDE 10 MILLIGRAM(S): 5 TABLET ORAL at 13:12

## 2018-03-10 RX ADMIN — OXYCODONE HYDROCHLORIDE 10 MILLIGRAM(S): 5 TABLET ORAL at 21:54

## 2018-03-10 RX ADMIN — Medication 4: at 12:24

## 2018-03-10 RX ADMIN — OXYCODONE HYDROCHLORIDE 10 MILLIGRAM(S): 5 TABLET ORAL at 07:02

## 2018-03-10 RX ADMIN — HEPARIN SODIUM 5000 UNIT(S): 5000 INJECTION INTRAVENOUS; SUBCUTANEOUS at 13:13

## 2018-03-10 RX ADMIN — Medication 8 UNIT(S): at 12:25

## 2018-03-10 RX ADMIN — OXYCODONE HYDROCHLORIDE 10 MILLIGRAM(S): 5 TABLET ORAL at 23:00

## 2018-03-10 RX ADMIN — BUDESONIDE AND FORMOTEROL FUMARATE DIHYDRATE 2 PUFF(S): 160; 4.5 AEROSOL RESPIRATORY (INHALATION) at 21:54

## 2018-03-10 RX ADMIN — Medication 250 MILLIGRAM(S): at 06:30

## 2018-03-10 RX ADMIN — OXYCODONE HYDROCHLORIDE 10 MILLIGRAM(S): 5 TABLET ORAL at 08:00

## 2018-03-10 RX ADMIN — LEVALBUTEROL 0.63 MILLIGRAM(S): 1.25 SOLUTION, CONCENTRATE RESPIRATORY (INHALATION) at 09:25

## 2018-03-10 RX ADMIN — Medication 8 UNIT(S): at 08:50

## 2018-03-10 RX ADMIN — Medication 40 MILLIGRAM(S): at 18:30

## 2018-03-10 RX ADMIN — Medication 100 MILLIGRAM(S): at 06:29

## 2018-03-10 RX ADMIN — Medication 650 MILLIGRAM(S): at 11:00

## 2018-03-10 RX ADMIN — Medication 100 MILLIGRAM(S): at 13:14

## 2018-03-10 RX ADMIN — BUDESONIDE AND FORMOTEROL FUMARATE DIHYDRATE 2 PUFF(S): 160; 4.5 AEROSOL RESPIRATORY (INHALATION) at 08:52

## 2018-03-10 RX ADMIN — ATORVASTATIN CALCIUM 10 MILLIGRAM(S): 80 TABLET, FILM COATED ORAL at 21:54

## 2018-03-10 RX ADMIN — Medication 40 MILLIGRAM(S): at 06:29

## 2018-03-10 RX ADMIN — BUDESONIDE AND FORMOTEROL FUMARATE DIHYDRATE 2 PUFF(S): 160; 4.5 AEROSOL RESPIRATORY (INHALATION) at 00:28

## 2018-03-10 RX ADMIN — LEVALBUTEROL 0.63 MILLIGRAM(S): 1.25 SOLUTION, CONCENTRATE RESPIRATORY (INHALATION) at 21:14

## 2018-03-10 RX ADMIN — Medication 100 MILLIGRAM(S): at 21:54

## 2018-03-10 RX ADMIN — OXYCODONE HYDROCHLORIDE 5 MILLIGRAM(S): 5 TABLET ORAL at 01:30

## 2018-03-10 RX ADMIN — LEVALBUTEROL 0.63 MILLIGRAM(S): 1.25 SOLUTION, CONCENTRATE RESPIRATORY (INHALATION) at 03:18

## 2018-03-10 RX ADMIN — SENNA PLUS 2 TABLET(S): 8.6 TABLET ORAL at 21:54

## 2018-03-10 NOTE — PROGRESS NOTE ADULT - SUBJECTIVE AND OBJECTIVE BOX
Subjective: pt feels well. States breathing has improved.  He is walking frequently in hallway. Pain controlled. +BM    Vital Signs:  Vital Signs Last 24 Hrs  T(C): 36.7 (03-10-18 @ 04:53), Max: 36.7 (03-09-18 @ 14:28)  T(F): 98 (03-10-18 @ 04:53), Max: 98.1 (03-09-18 @ 14:28)  HR: 86 (03-10-18 @ 04:53) (76 - 100)  BP: 116/75 (03-10-18 @ 04:53) (111/61 - 140/86)  RR: 18 (03-10-18 @ 04:53) (13 - 23)  SpO2: 95% (03-10-18 @ 04:53) (92% - 99%) on (O2)    Telemetry/Alarms:  General: WN/WD NAD  Neurology: Awake, nonfocal, TORRES x 4  Eyes: Scleras clear, PERRLA/ EOMI, Gross vision intact  ENT:Gross hearing intact, grossly patent pharynx, no stridor  Neck: Neck supple, trachea midline, No JVD, less SQ air  Respiratory: CTA B/L, No wheezing, rales, rhonchi  CV: RRR, S1S2, no murmurs, rubs or gallops  Abdominal: Soft, NT, ND +BS,   Extremities: No edema, + peripheral pulses  Skin: No Rashes, Hematoma, Ecchymosis  Lymphatic: No Neck, axilla, groin LAD  Psych: Oriented x 3, normal affect  Incisions: c,d,i  Tubes: already removed postoperatively  Relevant labs, radiology and Medications reviewed                        13.2   18.77 )-----------( 241      ( 10 Mar 2018 05:48 )             39.9     03-09    140  |  104  |  24<H>  ----------------------------<  194<H>  4.6   |  21<L>  |  0.79    Ca    7.9<L>      09 Mar 2018 04:30  Phos  3.6     03-09  Mg     2.7     03-09    TPro  6.6  /  Alb  3.2<L>  /  TBili  0.5  /  DBili  x   /  AST  29  /  ALT  27  /  AlkPhos  51  03-09      MEDICATIONS  (STANDING):  atorvastatin 10 milliGRAM(s) Oral at bedtime  buDESOnide 160 MICROgram(s)/formoterol 4.5 MICROgram(s) Inhaler 2 Puff(s) Inhalation two times a day  dextrose 5%. 1000 milliLiter(s) (50 mL/Hr) IV Continuous <Continuous>  dextrose 50% Injectable 12.5 Gram(s) IV Push once  dextrose 50% Injectable 25 Gram(s) IV Push once  dextrose 50% Injectable 25 Gram(s) IV Push once  diltiazem    Tablet 30 milliGRAM(s) Oral every 8 hours  docusate sodium 100 milliGRAM(s) Oral three times a day  heparin  Injectable 5000 Unit(s) SubCutaneous every 8 hours  insulin lispro (HumaLOG) corrective regimen sliding scale   SubCutaneous three times a day before meals  insulin lispro Injectable (HumaLOG) 8 Unit(s) SubCutaneous three times a day before meals  levalbuterol Inhalation 0.63 milliGRAM(s) Inhalation every 6 hours  loratadine 10 milliGRAM(s) Oral daily  methylPREDNISolone sodium succinate Injectable 40 milliGRAM(s) IV Push every 12 hours  pantoprazole    Tablet 40 milliGRAM(s) Oral before breakfast  piperacillin/tazobactam IVPB. 3.375 Gram(s) IV Intermittent every 8 hours  senna 2 Tablet(s) Oral at bedtime  tamsulosin 0.4 milliGRAM(s) Oral at bedtime  vancomycin  IVPB 1000 milliGRAM(s) IV Intermittent every 12 hours    MEDICATIONS  (PRN):  acetaminophen   Tablet. 650 milliGRAM(s) Oral every 6 hours PRN Mild Pain (1 - 3)  ALBUTerol    90 MICROgram(s) HFA Inhaler 2 Puff(s) Inhalation every 4 hours PRN Shortness of Breath  benzocaine 15 mG/menthol 3.6 mG Lozenge 1 Lozenge Oral every 4 hours PRN Sore Throat  bisacodyl Suppository 10 milliGRAM(s) Rectal daily PRN Constipation  dextrose Gel 1 Dose(s) Oral once PRN Blood Glucose LESS THAN 70 milliGRAM(s)/deciliter  glucagon  Injectable 1 milliGRAM(s) IntraMuscular once PRN Glucose LESS THAN 70 milligrams/deciliter  guaiFENesin    Syrup 100 milliGRAM(s) Oral every 6 hours PRN Cough  oxyCODONE    IR 5 milliGRAM(s) Oral every 4 hours PRN Moderate Pain (4 - 6)  oxyCODONE    IR 10 milliGRAM(s) Oral every 4 hours PRN Severe Pain (7 - 10)  polyethylene glycol 3350 17 Gram(s) Oral daily PRN Constipation    Pertinent Physical Exam  I&O's Summary    09 Mar 2018 07:01  -  10 Mar 2018 07:00  --------------------------------------------------------  IN: 680 mL / OUT: 1700 mL / NET: -1020 mL        Assessment  72y Male  w/ PAST MEDICAL & SURGICAL HISTORY:  Impaired glucose tolerance: on diet control  Other nonspecific abnormal finding of lung field  DALIA on CPAP  TB (tuberculosis): age 20:  Treated with meds X 1year  Fatty liver  Renal calculi: &#x27; 2005:  passed  BPH (Benign Prostatic Hyperplasia)  Asthma: mild  Elevated triglycerides with high cholesterol  Foot fracture, left: &quot; 90&#x27;s  History of prostate surgery: greenlight laser 2013  H/O left inguinal hernia repair  Status post cataract extraction: bilateral  Foot fracture, left: &#x27; 90&#x27;s:  ORIF  admitted with complaints of Patient is a 72y old  Male who presents with a chief complaint of other specific abnormal finding of lung field (05 Mar 2018 19:53)  .  On (2/28/19), patient underwent Navigational bronchoscopy  Flexible bronchoscopy  Lobectomy, lung, robot assisted  . Postoperative course/issues: SQ air, bronchospasm on IV steroids, hyperglycemia on premeal, RLL infiltrate on vanco and zosyn, DALIA on CPAP at night    PLAN  Neuro: Pain management  Pulm: Encourage coughing, deep breathing and use of incentive spirometry. Wean off supplemental oxygen as able. Daily CXR. IV steroids  Cardio: Monitor telemetry/alarms  GI: Tolerating diet. Continue stool softeners. premeal insulin  Renal: monitor urine output, supplement electrolytes as needed  Vasc: Heparin SC/SCDs for DVT prophylaxis  Heme: Stable H/H. .   ID: Vanco/Zosyn  Therapy: OOB/ambulate    Disposition: Aim to D/C to home once clinically optimized  Discussed with Cardiothoracic Team at AM rounds.

## 2018-03-11 LAB
BUN SERPL-MCNC: 23 MG/DL — SIGNIFICANT CHANGE UP (ref 7–23)
CALCIUM SERPL-MCNC: 7.6 MG/DL — LOW (ref 8.4–10.5)
CHLORIDE SERPL-SCNC: 103 MMOL/L — SIGNIFICANT CHANGE UP (ref 98–107)
CO2 SERPL-SCNC: 22 MMOL/L — SIGNIFICANT CHANGE UP (ref 22–31)
CREAT SERPL-MCNC: 0.79 MG/DL — SIGNIFICANT CHANGE UP (ref 0.5–1.3)
GLUCOSE SERPL-MCNC: 153 MG/DL — HIGH (ref 70–99)
HCT VFR BLD CALC: 39.4 % — SIGNIFICANT CHANGE UP (ref 39–50)
HGB BLD-MCNC: 13.5 G/DL — SIGNIFICANT CHANGE UP (ref 13–17)
MCHC RBC-ENTMCNC: 30.8 PG — SIGNIFICANT CHANGE UP (ref 27–34)
MCHC RBC-ENTMCNC: 34.3 % — SIGNIFICANT CHANGE UP (ref 32–36)
MCV RBC AUTO: 90 FL — SIGNIFICANT CHANGE UP (ref 80–100)
NRBC # FLD: 0 — SIGNIFICANT CHANGE UP
PLATELET # BLD AUTO: 248 K/UL — SIGNIFICANT CHANGE UP (ref 150–400)
PMV BLD: 10.5 FL — SIGNIFICANT CHANGE UP (ref 7–13)
POTASSIUM SERPL-MCNC: 3.8 MMOL/L — SIGNIFICANT CHANGE UP (ref 3.5–5.3)
POTASSIUM SERPL-SCNC: 3.8 MMOL/L — SIGNIFICANT CHANGE UP (ref 3.5–5.3)
RBC # BLD: 4.38 M/UL — SIGNIFICANT CHANGE UP (ref 4.2–5.8)
RBC # FLD: 13.4 % — SIGNIFICANT CHANGE UP (ref 10.3–14.5)
SODIUM SERPL-SCNC: 140 MMOL/L — SIGNIFICANT CHANGE UP (ref 135–145)
VANCOMYCIN TROUGH SERPL-MCNC: 9.7 UG/ML — LOW (ref 10–20)
WBC # BLD: 19.46 K/UL — HIGH (ref 3.8–10.5)
WBC # FLD AUTO: 19.46 K/UL — HIGH (ref 3.8–10.5)

## 2018-03-11 PROCEDURE — 71045 X-RAY EXAM CHEST 1 VIEW: CPT | Mod: 26

## 2018-03-11 RX ORDER — VANCOMYCIN HCL 1 G
1250 VIAL (EA) INTRAVENOUS ONCE
Qty: 0 | Refills: 0 | Status: COMPLETED | OUTPATIENT
Start: 2018-03-11 | End: 2018-03-11

## 2018-03-11 RX ORDER — VANCOMYCIN HCL 1 G
1250 VIAL (EA) INTRAVENOUS EVERY 12 HOURS
Qty: 0 | Refills: 0 | Status: DISCONTINUED | OUTPATIENT
Start: 2018-03-11 | End: 2018-03-12

## 2018-03-11 RX ORDER — OXYCODONE HYDROCHLORIDE 5 MG/1
10 TABLET ORAL EVERY 4 HOURS
Qty: 0 | Refills: 0 | Status: DISCONTINUED | OUTPATIENT
Start: 2018-03-11 | End: 2018-03-12

## 2018-03-11 RX ADMIN — Medication 8 UNIT(S): at 08:38

## 2018-03-11 RX ADMIN — Medication 100 MILLIGRAM(S): at 21:50

## 2018-03-11 RX ADMIN — Medication 2: at 11:57

## 2018-03-11 RX ADMIN — PIPERACILLIN AND TAZOBACTAM 25 GRAM(S): 4; .5 INJECTION, POWDER, LYOPHILIZED, FOR SOLUTION INTRAVENOUS at 05:26

## 2018-03-11 RX ADMIN — HEPARIN SODIUM 5000 UNIT(S): 5000 INJECTION INTRAVENOUS; SUBCUTANEOUS at 05:26

## 2018-03-11 RX ADMIN — OXYCODONE HYDROCHLORIDE 10 MILLIGRAM(S): 5 TABLET ORAL at 21:50

## 2018-03-11 RX ADMIN — PANTOPRAZOLE SODIUM 40 MILLIGRAM(S): 20 TABLET, DELAYED RELEASE ORAL at 05:27

## 2018-03-11 RX ADMIN — Medication 50 MILLIGRAM(S): at 17:06

## 2018-03-11 RX ADMIN — HEPARIN SODIUM 5000 UNIT(S): 5000 INJECTION INTRAVENOUS; SUBCUTANEOUS at 21:50

## 2018-03-11 RX ADMIN — LEVALBUTEROL 0.63 MILLIGRAM(S): 1.25 SOLUTION, CONCENTRATE RESPIRATORY (INHALATION) at 23:24

## 2018-03-11 RX ADMIN — OXYCODONE HYDROCHLORIDE 5 MILLIGRAM(S): 5 TABLET ORAL at 08:33

## 2018-03-11 RX ADMIN — Medication 100 MILLIGRAM(S): at 05:26

## 2018-03-11 RX ADMIN — OXYCODONE HYDROCHLORIDE 10 MILLIGRAM(S): 5 TABLET ORAL at 16:02

## 2018-03-11 RX ADMIN — Medication 50 MILLIGRAM(S): at 05:26

## 2018-03-11 RX ADMIN — LORATADINE 10 MILLIGRAM(S): 10 TABLET ORAL at 11:14

## 2018-03-11 RX ADMIN — Medication 8 UNIT(S): at 11:56

## 2018-03-11 RX ADMIN — OXYCODONE HYDROCHLORIDE 10 MILLIGRAM(S): 5 TABLET ORAL at 04:30

## 2018-03-11 RX ADMIN — LEVALBUTEROL 0.63 MILLIGRAM(S): 1.25 SOLUTION, CONCENTRATE RESPIRATORY (INHALATION) at 03:47

## 2018-03-11 RX ADMIN — HEPARIN SODIUM 5000 UNIT(S): 5000 INJECTION INTRAVENOUS; SUBCUTANEOUS at 13:50

## 2018-03-11 RX ADMIN — OXYCODONE HYDROCHLORIDE 10 MILLIGRAM(S): 5 TABLET ORAL at 17:07

## 2018-03-11 RX ADMIN — BUDESONIDE AND FORMOTEROL FUMARATE DIHYDRATE 2 PUFF(S): 160; 4.5 AEROSOL RESPIRATORY (INHALATION) at 21:51

## 2018-03-11 RX ADMIN — LEVALBUTEROL 0.63 MILLIGRAM(S): 1.25 SOLUTION, CONCENTRATE RESPIRATORY (INHALATION) at 15:31

## 2018-03-11 RX ADMIN — PIPERACILLIN AND TAZOBACTAM 25 GRAM(S): 4; .5 INJECTION, POWDER, LYOPHILIZED, FOR SOLUTION INTRAVENOUS at 21:50

## 2018-03-11 RX ADMIN — LEVALBUTEROL 0.63 MILLIGRAM(S): 1.25 SOLUTION, CONCENTRATE RESPIRATORY (INHALATION) at 08:56

## 2018-03-11 RX ADMIN — PIPERACILLIN AND TAZOBACTAM 25 GRAM(S): 4; .5 INJECTION, POWDER, LYOPHILIZED, FOR SOLUTION INTRAVENOUS at 13:50

## 2018-03-11 RX ADMIN — OXYCODONE HYDROCHLORIDE 10 MILLIGRAM(S): 5 TABLET ORAL at 05:15

## 2018-03-11 RX ADMIN — SENNA PLUS 2 TABLET(S): 8.6 TABLET ORAL at 21:50

## 2018-03-11 RX ADMIN — Medication 8 UNIT(S): at 17:06

## 2018-03-11 RX ADMIN — Medication 100 MILLIGRAM(S): at 13:50

## 2018-03-11 RX ADMIN — OXYCODONE HYDROCHLORIDE 5 MILLIGRAM(S): 5 TABLET ORAL at 07:36

## 2018-03-11 RX ADMIN — BUDESONIDE AND FORMOTEROL FUMARATE DIHYDRATE 2 PUFF(S): 160; 4.5 AEROSOL RESPIRATORY (INHALATION) at 08:38

## 2018-03-11 RX ADMIN — ATORVASTATIN CALCIUM 10 MILLIGRAM(S): 80 TABLET, FILM COATED ORAL at 21:49

## 2018-03-11 RX ADMIN — Medication 166.67 MILLIGRAM(S): at 08:38

## 2018-03-11 RX ADMIN — TAMSULOSIN HYDROCHLORIDE 0.4 MILLIGRAM(S): 0.4 CAPSULE ORAL at 21:50

## 2018-03-11 RX ADMIN — Medication 166.67 MILLIGRAM(S): at 19:24

## 2018-03-11 RX ADMIN — OXYCODONE HYDROCHLORIDE 10 MILLIGRAM(S): 5 TABLET ORAL at 22:50

## 2018-03-11 NOTE — PROGRESS NOTE ADULT - SUBJECTIVE AND OBJECTIVE BOX
Subjective: oob to chair; states he feels well but sometimes has shortness of breath and cough mostly with ambulating     Vital Signs:  Vital Signs Last 24 Hrs  T(C): 36.7 (03-11-18 @ 07:50), Max: 36.9 (03-10-18 @ 20:35)  T(F): 98.1 (03-11-18 @ 07:50), Max: 98.5 (03-10-18 @ 20:35)  HR: 82 (03-11-18 @ 07:50) (74 - 88)  BP: 127/77 (03-11-18 @ 07:50) (120/75 - 129/80)  RR: 17 (03-11-18 @ 07:50) (17 - 18)  SpO2: 94% (03-11-18 @ 07:50) (93% - 98%) on (O2)    Telemetry/Alarms:  General: WN/WD NAD  Neurology: Awake, nonfocal, TORRES x 4  Eyes: Scleras clear, PERRLA/ EOMI, Gross vision intact  ENT:Gross hearing intact, grossly patent pharynx, no stridor  Neck: Neck supple, trachea midline, No JVD,   Respiratory: CTA B/L, No wheezing, rales, rhonchi  CV: RRR, S1S2, no murmurs, rubs or gallops  Abdominal: Soft, NT, ND +BS,   Extremities: No edema, + peripheral pulses  Skin: No Rashes, Hematoma, Ecchymosis  Lymphatic: No Neck, axilla, groin LAD  Psych: Oriented x 3, normal affect  Incisions: cdi  Relevant labs, radiology and Medications reviewed                        13.5   19.46 )-----------( 248      ( 11 Mar 2018 05:50 )             39.4     03-11    140  |  103  |  23  ----------------------------<  153<H>  3.8   |  22  |  0.79    Ca    7.6<L>      11 Mar 2018 05:50        MEDICATIONS  (STANDING):  atorvastatin 10 milliGRAM(s) Oral at bedtime  buDESOnide 160 MICROgram(s)/formoterol 4.5 MICROgram(s) Inhaler 2 Puff(s) Inhalation two times a day  dextrose 5%. 1000 milliLiter(s) (50 mL/Hr) IV Continuous <Continuous>  dextrose 50% Injectable 12.5 Gram(s) IV Push once  dextrose 50% Injectable 25 Gram(s) IV Push once  dextrose 50% Injectable 25 Gram(s) IV Push once  diltiazem    Tablet 30 milliGRAM(s) Oral every 8 hours  docusate sodium 100 milliGRAM(s) Oral three times a day  heparin  Injectable 5000 Unit(s) SubCutaneous every 8 hours  insulin lispro (HumaLOG) corrective regimen sliding scale   SubCutaneous three times a day before meals  insulin lispro Injectable (HumaLOG) 8 Unit(s) SubCutaneous three times a day before meals  levalbuterol Inhalation 0.63 milliGRAM(s) Inhalation every 6 hours  loratadine 10 milliGRAM(s) Oral daily  pantoprazole    Tablet 40 milliGRAM(s) Oral before breakfast  piperacillin/tazobactam IVPB. 3.375 Gram(s) IV Intermittent every 8 hours  predniSONE   Tablet 50 milliGRAM(s) Oral two times a day  senna 2 Tablet(s) Oral at bedtime  tamsulosin 0.4 milliGRAM(s) Oral at bedtime  vancomycin  IVPB 1250 milliGRAM(s) IV Intermittent every 12 hours    MEDICATIONS  (PRN):  acetaminophen   Tablet. 650 milliGRAM(s) Oral every 6 hours PRN Mild Pain (1 - 3)  ALBUTerol    90 MICROgram(s) HFA Inhaler 2 Puff(s) Inhalation every 4 hours PRN Shortness of Breath  benzocaine 15 mG/menthol 3.6 mG Lozenge 1 Lozenge Oral every 4 hours PRN Sore Throat  bisacodyl Suppository 10 milliGRAM(s) Rectal daily PRN Constipation  dextrose Gel 1 Dose(s) Oral once PRN Blood Glucose LESS THAN 70 milliGRAM(s)/deciliter  glucagon  Injectable 1 milliGRAM(s) IntraMuscular once PRN Glucose LESS THAN 70 milligrams/deciliter  guaiFENesin    Syrup 100 milliGRAM(s) Oral every 6 hours PRN Cough  oxyCODONE    IR 5 milliGRAM(s) Oral every 4 hours PRN Moderate Pain (4 - 6)  oxyCODONE    IR 10 milliGRAM(s) Oral every 4 hours PRN Severe Pain (7 - 10)  polyethylene glycol 3350 17 Gram(s) Oral daily PRN Constipation    Pertinent Physical Exam  I&O's Summary    10 Mar 2018 06:01  -  11 Mar 2018 07:00  --------------------------------------------------------  IN: 350 mL / OUT: 1525 mL / NET: -1175 mL        Assessment  72y Male  w/ PAST MEDICAL & SURGICAL HISTORY:  Impaired glucose tolerance: on diet control  Other nonspecific abnormal finding of lung field  DALIA on CPAP  TB (tuberculosis): age 20:  Treated with meds X 1year  Fatty liver  Renal calculi: &#x27; 2005:  passed  BPH (Benign Prostatic Hyperplasia)  Asthma: mild  Elevated triglycerides with high cholesterol  Foot fracture, left: &quot; 90&#x27;s  History of prostate surgery: greenlight laser 2013  H/O left inguinal hernia repair  Status post cataract extraction: bilateral  Foot fracture, left: &#x27; 90&#x27;s:  ORIF  admitted with complaints of Patient is a 72y old  Male who presents with a chief complaint of other specific abnormal finding of lung field (05 Mar 2018 19:53)  .  On (2/28/19), patient underwent Navigational bronch with biopsy, robotic right VATS RLL lobectomy. Postoperative course/issues: SQ air, bronchospasm on IV steroids, hyperglycemia on premeal, RLL infiltrate on vanco and zosyn, DALIA on CPAP at night    PLAN  Neuro: Pain management;   acetaminophen   Tablet. 650 milliGRAM(s) Oral every 6 hours PRN Mild Pain (1 - 3)  oxyCODONE    IR 5 milliGRAM(s) Oral every 4 hours PRN Moderate Pain (4 - 6)  oxyCODONE    IR 10 milliGRAM(s) Oral every 4 hours PRN Severe Pain (7 - 10)  Pulm: Encourage coughing, deep breathing and use of incentive spirometry. Wean off supplemental oxygen as able. Daily CXR. PO steroid wean regimen x 2 weeks to start today (off IV steroid as of yesterday)  Cardio: Monitor telemetry/alarms  GI: Tolerating diet. Continue stool softeners.  Renal: monitor urine output, supplement electrolytes as needed  Vasc: Heparin SC/SCDs for DVT prophylaxis  Heme: Stable H/H. .   ID: Increased vancomycin to 1250mg IV BID with next vancomycin trough ordered prior to 4th dose. C/w zosyn.   Therapy: OOB/ambulate  Disposition: Aim to D/C to home when stable & off IV abx   Discussed with Cardiothoracic Team at AM rounds.

## 2018-03-12 VITALS
DIASTOLIC BLOOD PRESSURE: 71 MMHG | HEART RATE: 107 BPM | RESPIRATION RATE: 19 BRPM | TEMPERATURE: 98 F | OXYGEN SATURATION: 94 % | SYSTOLIC BLOOD PRESSURE: 115 MMHG

## 2018-03-12 LAB
BACTERIA BLD CULT: SIGNIFICANT CHANGE UP
BACTERIA BLD CULT: SIGNIFICANT CHANGE UP
BUN SERPL-MCNC: 22 MG/DL — SIGNIFICANT CHANGE UP (ref 7–23)
CALCIUM SERPL-MCNC: 8.2 MG/DL — LOW (ref 8.4–10.5)
CHLORIDE SERPL-SCNC: 102 MMOL/L — SIGNIFICANT CHANGE UP (ref 98–107)
CO2 SERPL-SCNC: 24 MMOL/L — SIGNIFICANT CHANGE UP (ref 22–31)
CREAT SERPL-MCNC: 0.86 MG/DL — SIGNIFICANT CHANGE UP (ref 0.5–1.3)
GLUCOSE SERPL-MCNC: 150 MG/DL — HIGH (ref 70–99)
HCT VFR BLD CALC: 41.3 % — SIGNIFICANT CHANGE UP (ref 39–50)
HGB BLD-MCNC: 13.8 G/DL — SIGNIFICANT CHANGE UP (ref 13–17)
MCHC RBC-ENTMCNC: 29.9 PG — SIGNIFICANT CHANGE UP (ref 27–34)
MCHC RBC-ENTMCNC: 33.4 % — SIGNIFICANT CHANGE UP (ref 32–36)
MCV RBC AUTO: 89.6 FL — SIGNIFICANT CHANGE UP (ref 80–100)
NRBC # FLD: 0 — SIGNIFICANT CHANGE UP
PLATELET # BLD AUTO: 251 K/UL — SIGNIFICANT CHANGE UP (ref 150–400)
PMV BLD: 10.7 FL — SIGNIFICANT CHANGE UP (ref 7–13)
POTASSIUM SERPL-MCNC: 4 MMOL/L — SIGNIFICANT CHANGE UP (ref 3.5–5.3)
POTASSIUM SERPL-SCNC: 4 MMOL/L — SIGNIFICANT CHANGE UP (ref 3.5–5.3)
RBC # BLD: 4.61 M/UL — SIGNIFICANT CHANGE UP (ref 4.2–5.8)
RBC # FLD: 13.7 % — SIGNIFICANT CHANGE UP (ref 10.3–14.5)
SODIUM SERPL-SCNC: 140 MMOL/L — SIGNIFICANT CHANGE UP (ref 135–145)
WBC # BLD: 21.38 K/UL — HIGH (ref 3.8–10.5)
WBC # FLD AUTO: 21.38 K/UL — HIGH (ref 3.8–10.5)

## 2018-03-12 PROCEDURE — 99238 HOSP IP/OBS DSCHRG MGMT 30/<: CPT

## 2018-03-12 PROCEDURE — 71045 X-RAY EXAM CHEST 1 VIEW: CPT | Mod: 26

## 2018-03-12 RX ORDER — OXYCODONE HYDROCHLORIDE 5 MG/1
2 TABLET ORAL
Qty: 60 | Refills: 0
Start: 2018-03-12 | End: 2018-03-16

## 2018-03-12 RX ORDER — PANTOPRAZOLE SODIUM 20 MG/1
1 TABLET, DELAYED RELEASE ORAL
Qty: 14 | Refills: 0
Start: 2018-03-12 | End: 2018-03-25

## 2018-03-12 RX ORDER — CIPROFLOXACIN LACTATE 400MG/40ML
500 VIAL (ML) INTRAVENOUS EVERY 12 HOURS
Qty: 0 | Refills: 0 | Status: DISCONTINUED | OUTPATIENT
Start: 2018-03-12 | End: 2018-03-12

## 2018-03-12 RX ORDER — DOCUSATE SODIUM 100 MG
1 CAPSULE ORAL
Qty: 42 | Refills: 0
Start: 2018-03-12 | End: 2018-03-25

## 2018-03-12 RX ORDER — DILTIAZEM HCL 120 MG
1 CAPSULE, EXT RELEASE 24 HR ORAL
Qty: 30 | Refills: 0
Start: 2018-03-12 | End: 2018-04-10

## 2018-03-12 RX ORDER — SENNA PLUS 8.6 MG/1
2 TABLET ORAL
Qty: 28 | Refills: 0
Start: 2018-03-12 | End: 2018-03-25

## 2018-03-12 RX ORDER — ACETAMINOPHEN 500 MG
2 TABLET ORAL
Qty: 112 | Refills: 0
Start: 2018-03-12 | End: 2018-03-25

## 2018-03-12 RX ORDER — POLYETHYLENE GLYCOL 3350 17 G/17G
17 POWDER, FOR SOLUTION ORAL
Qty: 1 | Refills: 0
Start: 2018-03-12 | End: 2018-03-25

## 2018-03-12 RX ORDER — CIPROFLOXACIN LACTATE 400MG/40ML
1 VIAL (ML) INTRAVENOUS
Qty: 7 | Refills: 0
Start: 2018-03-12 | End: 2018-03-18

## 2018-03-12 RX ADMIN — HEPARIN SODIUM 5000 UNIT(S): 5000 INJECTION INTRAVENOUS; SUBCUTANEOUS at 05:34

## 2018-03-12 RX ADMIN — OXYCODONE HYDROCHLORIDE 10 MILLIGRAM(S): 5 TABLET ORAL at 09:10

## 2018-03-12 RX ADMIN — OXYCODONE HYDROCHLORIDE 10 MILLIGRAM(S): 5 TABLET ORAL at 12:37

## 2018-03-12 RX ADMIN — OXYCODONE HYDROCHLORIDE 10 MILLIGRAM(S): 5 TABLET ORAL at 08:21

## 2018-03-12 RX ADMIN — Medication 2: at 12:03

## 2018-03-12 RX ADMIN — Medication 8 UNIT(S): at 08:17

## 2018-03-12 RX ADMIN — LEVALBUTEROL 0.63 MILLIGRAM(S): 1.25 SOLUTION, CONCENTRATE RESPIRATORY (INHALATION) at 09:16

## 2018-03-12 RX ADMIN — Medication 100 MILLIGRAM(S): at 05:34

## 2018-03-12 RX ADMIN — OXYCODONE HYDROCHLORIDE 10 MILLIGRAM(S): 5 TABLET ORAL at 00:58

## 2018-03-12 RX ADMIN — LORATADINE 10 MILLIGRAM(S): 10 TABLET ORAL at 11:19

## 2018-03-12 RX ADMIN — PIPERACILLIN AND TAZOBACTAM 25 GRAM(S): 4; .5 INJECTION, POWDER, LYOPHILIZED, FOR SOLUTION INTRAVENOUS at 05:34

## 2018-03-12 RX ADMIN — PANTOPRAZOLE SODIUM 40 MILLIGRAM(S): 20 TABLET, DELAYED RELEASE ORAL at 05:34

## 2018-03-12 RX ADMIN — Medication 8 UNIT(S): at 12:02

## 2018-03-12 RX ADMIN — LEVALBUTEROL 0.63 MILLIGRAM(S): 1.25 SOLUTION, CONCENTRATE RESPIRATORY (INHALATION) at 03:34

## 2018-03-12 RX ADMIN — Medication 50 MILLIGRAM(S): at 05:34

## 2018-03-12 RX ADMIN — OXYCODONE HYDROCHLORIDE 10 MILLIGRAM(S): 5 TABLET ORAL at 01:45

## 2018-03-12 RX ADMIN — BUDESONIDE AND FORMOTEROL FUMARATE DIHYDRATE 2 PUFF(S): 160; 4.5 AEROSOL RESPIRATORY (INHALATION) at 08:17

## 2018-03-12 NOTE — PROGRESS NOTE ADULT - PROVIDER SPECIALTY LIST ADULT
Anesthesia
CT Surgery
CT Surgery
Critical Care
Pain Medicine
Thoracic Surgery
Critical Care
Pain Medicine
Thoracic Surgery

## 2018-03-12 NOTE — PROGRESS NOTE ADULT - SUBJECTIVE AND OBJECTIVE BOX
Subjective: Pt feels well. No acute complaints. Patient's oxygen saturation at rest is 88, will get oxygen saturation while ambulating      Vital Signs:  Vital Signs Last 24 Hrs  T(C): 36.7 (03-12-18 @ 08:14), Max: 36.7 (03-11-18 @ 11:19)  T(F): 98 (03-12-18 @ 08:14), Max: 98.1 (03-11-18 @ 11:19)  HR: 78 (03-12-18 @ 09:16) (76 - 102)  BP: 133/83 (03-12-18 @ 08:14) (115/68 - 146/83)  RR: 20 (03-12-18 @ 10:22) (16 - 20)  SpO2: 89% (03-12-18 @ 10:22) (86% - 97%) on (O2)    Telemetry/Alarms:    Relevant labs, radiology and Medications reviewed                        13.8   21.38 )-----------( 251      ( 12 Mar 2018 05:28 )             41.3     03-12    140  |  102  |  22  ----------------------------<  150<H>  4.0   |  24  |  0.86    Ca    8.2<L>      12 Mar 2018 05:28        MEDICATIONS  (STANDING):  atorvastatin 10 milliGRAM(s) Oral at bedtime  buDESOnide 160 MICROgram(s)/formoterol 4.5 MICROgram(s) Inhaler 2 Puff(s) Inhalation two times a day  dextrose 5%. 1000 milliLiter(s) (50 mL/Hr) IV Continuous <Continuous>  dextrose 50% Injectable 12.5 Gram(s) IV Push once  dextrose 50% Injectable 25 Gram(s) IV Push once  dextrose 50% Injectable 25 Gram(s) IV Push once  diltiazem    Tablet 30 milliGRAM(s) Oral every 8 hours  docusate sodium 100 milliGRAM(s) Oral three times a day  heparin  Injectable 5000 Unit(s) SubCutaneous every 8 hours  insulin lispro (HumaLOG) corrective regimen sliding scale   SubCutaneous three times a day before meals  insulin lispro Injectable (HumaLOG) 8 Unit(s) SubCutaneous three times a day before meals  levalbuterol Inhalation 0.63 milliGRAM(s) Inhalation every 6 hours  levoFLOXacin  Tablet 750 milliGRAM(s) Oral every 24 hours  loratadine 10 milliGRAM(s) Oral daily  pantoprazole    Tablet 40 milliGRAM(s) Oral before breakfast  predniSONE   Tablet 50 milliGRAM(s) Oral two times a day  senna 2 Tablet(s) Oral at bedtime  tamsulosin 0.4 milliGRAM(s) Oral at bedtime    MEDICATIONS  (PRN):  acetaminophen   Tablet. 650 milliGRAM(s) Oral every 6 hours PRN Mild Pain (1 - 3)  ALBUTerol    90 MICROgram(s) HFA Inhaler 2 Puff(s) Inhalation every 4 hours PRN Shortness of Breath  benzocaine 15 mG/menthol 3.6 mG Lozenge 1 Lozenge Oral every 4 hours PRN Sore Throat  bisacodyl Suppository 10 milliGRAM(s) Rectal daily PRN Constipation  dextrose Gel 1 Dose(s) Oral once PRN Blood Glucose LESS THAN 70 milliGRAM(s)/deciliter  glucagon  Injectable 1 milliGRAM(s) IntraMuscular once PRN Glucose LESS THAN 70 milligrams/deciliter  guaiFENesin    Syrup 100 milliGRAM(s) Oral every 6 hours PRN Cough  oxyCODONE    IR 10 milliGRAM(s) Oral every 4 hours PRN Severe Pain (7 - 10)  polyethylene glycol 3350 17 Gram(s) Oral daily PRN Constipation      Social:  Denies Smoking, Drinking illict drug use    Physical exam  General: WN/WD NAD  Neurology: Awake, nonfocal, TORRES x 4  Eyes: Scleras clear, PERRLA/ EOMI, Gross vision intact  ENT: Gross hearing intact, grossly patent pharynx, no stridor  Neck: Neck supple, trachea midline, No JVD,   Respiratory: CTA B/L, No wheezing, rales, rhonchi  CV: RRR, S1S2, no murmurs, rubs or gallops  Abdominal: Soft, NT, ND +BS,   Extremities: No edema, + peripheral pulses  Skin: No Rashes, Hematoma, Ecchymosis  Lymphatic: No Neck, axilla, groin LAD  Psych: Oriented x 3, normal affect  Incisions: c,d,i    I&O's Summary    11 Mar 2018 07:01  -  12 Mar 2018 07:00  --------------------------------------------------------  IN: 280 mL / OUT: 2300 mL / NET: -2020 mL    12 Mar 2018 07:01  -  12 Mar 2018 10:31  --------------------------------------------------------  IN: 0 mL / OUT: 450 mL / NET: -450 mL        Assessment  72y Male  w/ PAST MEDICAL & SURGICAL HISTORY:  Impaired glucose tolerance: on diet control  Other nonspecific abnormal finding of lung field  DALIA on CPAP  TB (tuberculosis): age 20:  Treated with meds X 1year  Fatty liver  Renal calculi: &#x27; 2005:  passed  BPH (Benign Prostatic Hyperplasia)  Asthma: mild  Elevated triglycerides with high cholesterol  Foot fracture, left: &quot; 90&#x27;s  History of prostate surgery: greenlight laser 2013  H/O left inguinal hernia repair  Status post cataract extraction: bilateral  Foot fracture, left: &#x27; 90&#x27;s:  ORIF  admitted with complaints of Patient is a 72y old  Male who presents with a chief complaint of Right lower lung mass (05 Mar 2018 19:53)  .  On (Date), patient underwent Navigational bronchoscopy  Flexible bronchoscopy  Lobectomy, lung, robot assisted  . Postoperative course/issues:    Patient's oxygen saturation at rest is 89%. Patient's oxygen saturation while ambulating is 86%. Patient's oxygen saturation on 2L is ___%     PLAN  Neuro: Pain management  Pulm: Ambulating oxygen saturation. Encourage coughing, deep breathing and use of incentive spirometry. Wean off supplemental oxygen as able. Daily CXR.   Cardio: Monitor telemetry/alarms  GI: Tolerating diet. Continue stool softeners.  Renal: monitor urine output, supplement electrolytes as needed  Vasc: Heparin SC/SCDs for DVT prophylaxis  Heme: Stable H/H. .   ID: Will change abx to PO levaquin. Stable.  Therapy: OOB/ambulate  Tubes: Monitor Chest tube output  Disposition: Aim to D/C to home on  Discussed with Cardiothoracic Team at AM rounds. Subjective: Pt feels well. No acute complaints. Patient's oxygen saturation at rest is 88, will get oxygen saturation while ambulating      Vital Signs:  Vital Signs Last 24 Hrs  T(C): 36.7 (03-12-18 @ 08:14), Max: 36.7 (03-11-18 @ 11:19)  T(F): 98 (03-12-18 @ 08:14), Max: 98.1 (03-11-18 @ 11:19)  HR: 78 (03-12-18 @ 09:16) (76 - 102)  BP: 133/83 (03-12-18 @ 08:14) (115/68 - 146/83)  RR: 20 (03-12-18 @ 10:22) (16 - 20)  SpO2: 89% (03-12-18 @ 10:22) (86% - 97%) on (O2)    Telemetry/Alarms:    Relevant labs, radiology and Medications reviewed                        13.8   21.38 )-----------( 251      ( 12 Mar 2018 05:28 )             41.3     03-12    140  |  102  |  22  ----------------------------<  150<H>  4.0   |  24  |  0.86    Ca    8.2<L>      12 Mar 2018 05:28        MEDICATIONS  (STANDING):  atorvastatin 10 milliGRAM(s) Oral at bedtime  buDESOnide 160 MICROgram(s)/formoterol 4.5 MICROgram(s) Inhaler 2 Puff(s) Inhalation two times a day  dextrose 5%. 1000 milliLiter(s) (50 mL/Hr) IV Continuous <Continuous>  dextrose 50% Injectable 12.5 Gram(s) IV Push once  dextrose 50% Injectable 25 Gram(s) IV Push once  dextrose 50% Injectable 25 Gram(s) IV Push once  diltiazem    Tablet 30 milliGRAM(s) Oral every 8 hours  docusate sodium 100 milliGRAM(s) Oral three times a day  heparin  Injectable 5000 Unit(s) SubCutaneous every 8 hours  insulin lispro (HumaLOG) corrective regimen sliding scale   SubCutaneous three times a day before meals  insulin lispro Injectable (HumaLOG) 8 Unit(s) SubCutaneous three times a day before meals  levalbuterol Inhalation 0.63 milliGRAM(s) Inhalation every 6 hours  levoFLOXacin  Tablet 750 milliGRAM(s) Oral every 24 hours  loratadine 10 milliGRAM(s) Oral daily  pantoprazole    Tablet 40 milliGRAM(s) Oral before breakfast  predniSONE   Tablet 50 milliGRAM(s) Oral two times a day  senna 2 Tablet(s) Oral at bedtime  tamsulosin 0.4 milliGRAM(s) Oral at bedtime    MEDICATIONS  (PRN):  acetaminophen   Tablet. 650 milliGRAM(s) Oral every 6 hours PRN Mild Pain (1 - 3)  ALBUTerol    90 MICROgram(s) HFA Inhaler 2 Puff(s) Inhalation every 4 hours PRN Shortness of Breath  benzocaine 15 mG/menthol 3.6 mG Lozenge 1 Lozenge Oral every 4 hours PRN Sore Throat  bisacodyl Suppository 10 milliGRAM(s) Rectal daily PRN Constipation  dextrose Gel 1 Dose(s) Oral once PRN Blood Glucose LESS THAN 70 milliGRAM(s)/deciliter  glucagon  Injectable 1 milliGRAM(s) IntraMuscular once PRN Glucose LESS THAN 70 milligrams/deciliter  guaiFENesin    Syrup 100 milliGRAM(s) Oral every 6 hours PRN Cough  oxyCODONE    IR 10 milliGRAM(s) Oral every 4 hours PRN Severe Pain (7 - 10)  polyethylene glycol 3350 17 Gram(s) Oral daily PRN Constipation      Social:  Denies Smoking, Drinking illict drug use    Physical exam  General: WN/WD NAD  Neurology: Awake, nonfocal, TORRES x 4  Eyes: Scleras clear, PERRLA/ EOMI, Gross vision intact  ENT: Gross hearing intact, grossly patent pharynx, no stridor  Neck: Neck supple, trachea midline, No JVD,   Respiratory: CTA B/L, No wheezing, rales, rhonchi  CV: RRR, S1S2, no murmurs, rubs or gallops  Abdominal: Soft, NT, ND +BS,   Extremities: No edema, + peripheral pulses  Skin: No Rashes, Hematoma, Ecchymosis  Lymphatic: No Neck, axilla, groin LAD  Psych: Oriented x 3, normal affect  Incisions: c,d,i    I&O's Summary    11 Mar 2018 07:01  -  12 Mar 2018 07:00  --------------------------------------------------------  IN: 280 mL / OUT: 2300 mL / NET: -2020 mL    12 Mar 2018 07:01  -  12 Mar 2018 10:31  --------------------------------------------------------  IN: 0 mL / OUT: 450 mL / NET: -450 mL        Assessment  72y Male  w/ PAST MEDICAL & SURGICAL HISTORY:  Impaired glucose tolerance: on diet control  Other nonspecific abnormal finding of lung field  DALIA on CPAP  TB (tuberculosis): age 20:  Treated with meds X 1year  Fatty liver  Renal calculi: &#x27; 2005:  passed  BPH (Benign Prostatic Hyperplasia)  Asthma: mild  Elevated triglycerides with high cholesterol  Foot fracture, left: &quot; 90&#x27;s  History of prostate surgery: greenlight laser 2013  H/O left inguinal hernia repair  Status post cataract extraction: bilateral  Foot fracture, left: &#x27; 90&#x27;s:  ORIF  admitted with complaints of Patient is a 72y old  Male who presents with a chief complaint of Right lower lung mass (05 Mar 2018 19:53)  .  On (Date), patient underwent Navigational bronchoscopy  Flexible bronchoscopy  Lobectomy, lung, robot assisted  . Postoperative course/issues:      PLAN  Neuro: Pain management  Pulm: Ambulating oxygen saturation. Encourage coughing, deep breathing and use of incentive spirometry. Wean off supplemental oxygen as able. Daily CXR.   Cardio: Monitor telemetry/alarms  GI: Tolerating diet. Continue stool softeners.  Renal: monitor urine output, supplement electrolytes as needed  Vasc: Heparin SC/SCDs for DVT prophylaxis  Heme: Stable H/H. .   ID: Will change abx to PO levaquin. Stable.  Therapy: OOB/ambulate  Tubes: Monitor Chest tube output  Disposition: Aim to D/C to home on  Discussed with Cardiothoracic Team at AM rounds.

## 2018-03-19 ENCOUNTER — FORM ENCOUNTER (OUTPATIENT)
Age: 73
End: 2018-03-19

## 2018-03-20 ENCOUNTER — OUTPATIENT (OUTPATIENT)
Dept: OUTPATIENT SERVICES | Facility: HOSPITAL | Age: 73
LOS: 1 days | End: 2018-03-20
Payer: MEDICARE

## 2018-03-20 ENCOUNTER — APPOINTMENT (OUTPATIENT)
Dept: THORACIC SURGERY | Facility: CLINIC | Age: 73
End: 2018-03-20
Payer: MEDICARE

## 2018-03-20 ENCOUNTER — APPOINTMENT (OUTPATIENT)
Dept: RADIOLOGY | Facility: HOSPITAL | Age: 73
End: 2018-03-20

## 2018-03-20 VITALS
DIASTOLIC BLOOD PRESSURE: 78 MMHG | RESPIRATION RATE: 20 BRPM | SYSTOLIC BLOOD PRESSURE: 128 MMHG | TEMPERATURE: 98.4 F | HEART RATE: 97 BPM | OXYGEN SATURATION: 95 %

## 2018-03-20 DIAGNOSIS — Z98.49 CATARACT EXTRACTION STATUS, UNSPECIFIED EYE: Chronic | ICD-10-CM

## 2018-03-20 DIAGNOSIS — C34.31 MALIGNANT NEOPLASM OF LOWER LOBE, RIGHT BRONCHUS OR LUNG: ICD-10-CM

## 2018-03-20 DIAGNOSIS — Z98.890 OTHER SPECIFIED POSTPROCEDURAL STATES: Chronic | ICD-10-CM

## 2018-03-20 PROCEDURE — 71046 X-RAY EXAM CHEST 2 VIEWS: CPT | Mod: 26

## 2018-03-20 PROCEDURE — 99024 POSTOP FOLLOW-UP VISIT: CPT

## 2018-03-20 RX ORDER — DILTIAZEM HYDROCHLORIDE 120 MG/1
120 CAPSULE, COATED, EXTENDED RELEASE ORAL
Refills: 0 | Status: ACTIVE | COMMUNITY

## 2018-03-20 RX ORDER — LORATADINE 10 MG/1
10 TABLET ORAL
Refills: 0 | Status: ACTIVE | COMMUNITY

## 2018-03-20 RX ORDER — MULTIVIT-MIN/FOLIC/VIT K/LYCOP 400-300MCG
25 MCG TABLET ORAL
Refills: 0 | Status: ACTIVE | COMMUNITY

## 2018-03-20 RX ORDER — AZITHROMYCIN 250 MG/1
250 TABLET, FILM COATED ORAL
Qty: 6 | Refills: 0 | Status: DISCONTINUED | COMMUNITY
Start: 2018-02-02 | End: 2018-03-20

## 2018-03-20 RX ORDER — ALBUTEROL 90 MCG
90 AEROSOL (GRAM) INHALATION
Refills: 0 | Status: DISCONTINUED | COMMUNITY
End: 2018-03-20

## 2018-03-30 LAB — FUNGUS SPEC QL CULT: SIGNIFICANT CHANGE UP

## 2018-04-11 LAB — ACID FAST STN SPEC: SIGNIFICANT CHANGE UP

## 2018-04-30 ENCOUNTER — FORM ENCOUNTER (OUTPATIENT)
Age: 73
End: 2018-04-30

## 2018-05-01 ENCOUNTER — APPOINTMENT (OUTPATIENT)
Dept: RADIOLOGY | Facility: HOSPITAL | Age: 73
End: 2018-05-01

## 2018-05-01 ENCOUNTER — APPOINTMENT (OUTPATIENT)
Dept: THORACIC SURGERY | Facility: CLINIC | Age: 73
End: 2018-05-01
Payer: MEDICARE

## 2018-05-01 ENCOUNTER — OUTPATIENT (OUTPATIENT)
Dept: OUTPATIENT SERVICES | Facility: HOSPITAL | Age: 73
LOS: 1 days | End: 2018-05-01
Payer: MEDICARE

## 2018-05-01 VITALS
RESPIRATION RATE: 16 BRPM | OXYGEN SATURATION: 97 % | DIASTOLIC BLOOD PRESSURE: 75 MMHG | HEIGHT: 65 IN | HEART RATE: 86 BPM | TEMPERATURE: 98.7 F | WEIGHT: 137 LBS | SYSTOLIC BLOOD PRESSURE: 113 MMHG | BODY MASS INDEX: 22.82 KG/M2

## 2018-05-01 DIAGNOSIS — Z98.49 CATARACT EXTRACTION STATUS, UNSPECIFIED EYE: Chronic | ICD-10-CM

## 2018-05-01 DIAGNOSIS — Z98.890 OTHER SPECIFIED POSTPROCEDURAL STATES: Chronic | ICD-10-CM

## 2018-05-01 DIAGNOSIS — C34.31 MALIGNANT NEOPLASM OF LOWER LOBE, RIGHT BRONCHUS OR LUNG: ICD-10-CM

## 2018-05-01 PROCEDURE — 99024 POSTOP FOLLOW-UP VISIT: CPT

## 2018-05-01 PROCEDURE — 71046 X-RAY EXAM CHEST 2 VIEWS: CPT | Mod: 26

## 2018-05-11 ENCOUNTER — APPOINTMENT (OUTPATIENT)
Dept: OPHTHALMOLOGY | Facility: CLINIC | Age: 73
End: 2018-05-11
Payer: MEDICARE

## 2018-05-11 DIAGNOSIS — H40.009 PREGLAUCOMA, UNSPECIFIED, UNSPECIFIED EYE: ICD-10-CM

## 2018-05-11 PROCEDURE — 92250 FUNDUS PHOTOGRAPHY W/I&R: CPT

## 2018-05-11 PROCEDURE — 92014 COMPRE OPH EXAM EST PT 1/>: CPT

## 2018-06-08 ENCOUNTER — APPOINTMENT (OUTPATIENT)
Dept: UROLOGY | Facility: CLINIC | Age: 73
End: 2018-06-08
Payer: MEDICARE

## 2018-06-08 VITALS
RESPIRATION RATE: 18 BRPM | TEMPERATURE: 98 F | DIASTOLIC BLOOD PRESSURE: 77 MMHG | BODY MASS INDEX: 23.63 KG/M2 | SYSTOLIC BLOOD PRESSURE: 119 MMHG | OXYGEN SATURATION: 98 % | HEART RATE: 87 BPM | WEIGHT: 142 LBS

## 2018-06-08 PROCEDURE — 99214 OFFICE O/P EST MOD 30 MIN: CPT

## 2018-06-15 ENCOUNTER — OTHER (OUTPATIENT)
Age: 73
End: 2018-06-15

## 2018-06-17 LAB
ANION GAP SERPL CALC-SCNC: 15 MMOL/L
APPEARANCE: CLEAR
BILIRUBIN URINE: NEGATIVE
BLOOD URINE: NEGATIVE
BUN SERPL-MCNC: 15 MG/DL
CALCIUM SERPL-MCNC: 9 MG/DL
CHLORIDE SERPL-SCNC: 106 MMOL/L
CO2 SERPL-SCNC: 23 MMOL/L
COLOR: YELLOW
CREAT SERPL-MCNC: 0.93 MG/DL
GLUCOSE QUALITATIVE U: NEGATIVE MG/DL
GLUCOSE SERPL-MCNC: 146 MG/DL
KETONES URINE: NEGATIVE
LEUKOCYTE ESTERASE URINE: NEGATIVE
NITRITE URINE: NEGATIVE
PH URINE: 5
POTASSIUM SERPL-SCNC: 4.2 MMOL/L
PROTEIN URINE: NEGATIVE MG/DL
PSA FREE FLD-MCNC: 35.2
PSA FREE SERPL-MCNC: 1.18 NG/ML
PSA SERPL-MCNC: 3.35 NG/ML
SODIUM SERPL-SCNC: 144 MMOL/L
SPECIFIC GRAVITY URINE: 1.02
UROBILINOGEN URINE: NEGATIVE MG/DL

## 2018-07-06 ENCOUNTER — APPOINTMENT (OUTPATIENT)
Dept: UROLOGY | Facility: CLINIC | Age: 73
End: 2018-07-06
Payer: MEDICARE

## 2018-07-06 VITALS
HEART RATE: 84 BPM | TEMPERATURE: 98.3 F | DIASTOLIC BLOOD PRESSURE: 72 MMHG | WEIGHT: 146 LBS | SYSTOLIC BLOOD PRESSURE: 124 MMHG | BODY MASS INDEX: 24.3 KG/M2 | OXYGEN SATURATION: 97 % | RESPIRATION RATE: 17 BRPM

## 2018-07-06 PROCEDURE — 99213 OFFICE O/P EST LOW 20 MIN: CPT

## 2018-07-06 RX ORDER — SOLIFENACIN SUCCINATE 5 MG/1
5 TABLET, FILM COATED ORAL DAILY
Qty: 28 | Refills: 0 | Status: COMPLETED | COMMUNITY
Start: 2018-06-08 | End: 2018-07-06

## 2018-07-10 ENCOUNTER — APPOINTMENT (OUTPATIENT)
Dept: THORACIC SURGERY | Facility: CLINIC | Age: 73
End: 2018-07-10
Payer: MEDICARE

## 2018-07-10 VITALS
BODY MASS INDEX: 24.3 KG/M2 | WEIGHT: 146 LBS | RESPIRATION RATE: 18 BRPM | DIASTOLIC BLOOD PRESSURE: 75 MMHG | TEMPERATURE: 98 F | HEART RATE: 89 BPM | SYSTOLIC BLOOD PRESSURE: 146 MMHG | OXYGEN SATURATION: 97 %

## 2018-07-10 DIAGNOSIS — Z87.09 PERSONAL HISTORY OF OTHER DISEASES OF THE RESPIRATORY SYSTEM: ICD-10-CM

## 2018-07-10 PROCEDURE — 99213 OFFICE O/P EST LOW 20 MIN: CPT

## 2018-07-10 RX ORDER — FLUOROMETHOLONE 1 MG/ML
0.1 SOLUTION/ DROPS OPHTHALMIC
Qty: 1 | Refills: 5 | Status: COMPLETED | COMMUNITY
Start: 2017-07-14 | End: 2018-07-10

## 2018-07-10 RX ORDER — SENNOSIDES 8.6 MG TABLETS 8.6 MG/1
8.6 TABLET ORAL
Refills: 0 | Status: COMPLETED | COMMUNITY
End: 2018-07-10

## 2018-07-10 RX ORDER — MONTELUKAST SODIUM 10 MG/1
10 TABLET, FILM COATED ORAL
Refills: 0 | Status: ACTIVE | COMMUNITY

## 2018-07-10 RX ORDER — NEOMYCIN AND POLYMYXIN B SULFATES AND DEXAMETHASONE 3.5; 10000; 1 MG/G; [IU]/G; MG/G
3.5-10000-0.1 OINTMENT OPHTHALMIC TWICE DAILY
Qty: 1 | Refills: 5 | Status: COMPLETED | COMMUNITY
Start: 2018-05-11 | End: 2018-07-10

## 2018-07-10 RX ORDER — MULTIVITAMIN/IRON/FOLIC ACID 18MG-0.4MG
600-400 TABLET ORAL
Refills: 0 | Status: COMPLETED | COMMUNITY
End: 2018-07-10

## 2018-07-10 RX ORDER — PANTOPRAZOLE 40 MG/1
40 TABLET, DELAYED RELEASE ORAL
Refills: 0 | Status: COMPLETED | COMMUNITY
End: 2018-07-10

## 2018-07-10 RX ORDER — OXYCODONE 5 MG/1
5 TABLET ORAL
Refills: 0 | Status: COMPLETED | COMMUNITY
End: 2018-07-10

## 2018-07-10 RX ORDER — NEOMYCIN SULFATE, POLYMYXIN B SULFATE AND DEXAMETHASONE 3.5; 10000; 1 MG/ML; [USP'U]/ML; MG/ML
3.5-10000-0.1 SUSPENSION OPHTHALMIC TWICE DAILY
Qty: 1 | Refills: 5 | Status: COMPLETED | COMMUNITY
Start: 2017-02-23 | End: 2018-07-10

## 2018-07-10 RX ORDER — PREDNISONE 10 MG/1
10 TABLET ORAL DAILY
Qty: 2 | Refills: 0 | Status: COMPLETED | COMMUNITY
Start: 2018-03-20 | End: 2018-07-10

## 2018-07-10 RX ORDER — FLUOROMETHOLONE 1 MG/ML
0.1 SOLUTION/ DROPS OPHTHALMIC TWICE DAILY
Qty: 1 | Refills: 5 | Status: COMPLETED | COMMUNITY
Start: 2017-12-15 | End: 2018-07-10

## 2018-07-10 RX ORDER — DOCUSATE SODIUM 100 MG/1
100 CAPSULE ORAL
Refills: 0 | Status: COMPLETED | COMMUNITY
End: 2018-07-10

## 2018-07-10 RX ORDER — PREDNISONE 10 MG/1
10 TABLET ORAL
Refills: 0 | Status: COMPLETED | COMMUNITY
End: 2018-07-10

## 2018-07-13 ENCOUNTER — TRANSCRIPTION ENCOUNTER (OUTPATIENT)
Age: 73
End: 2018-07-13

## 2018-09-11 ENCOUNTER — APPOINTMENT (OUTPATIENT)
Dept: THORACIC SURGERY | Facility: CLINIC | Age: 73
End: 2018-09-11

## 2018-09-12 ENCOUNTER — APPOINTMENT (OUTPATIENT)
Dept: OPHTHALMOLOGY | Facility: CLINIC | Age: 73
End: 2018-09-12

## 2018-09-12 DIAGNOSIS — H26.491 OTHER SECONDARY CATARACT, RIGHT EYE: ICD-10-CM

## 2019-01-04 ENCOUNTER — APPOINTMENT (OUTPATIENT)
Dept: UROLOGY | Facility: CLINIC | Age: 74
End: 2019-01-04
Payer: MEDICARE

## 2019-01-04 VITALS
OXYGEN SATURATION: 97 % | BODY MASS INDEX: 24.63 KG/M2 | TEMPERATURE: 97.4 F | HEART RATE: 72 BPM | RESPIRATION RATE: 18 BRPM | DIASTOLIC BLOOD PRESSURE: 83 MMHG | WEIGHT: 148 LBS | SYSTOLIC BLOOD PRESSURE: 143 MMHG

## 2019-01-04 PROCEDURE — 99213 OFFICE O/P EST LOW 20 MIN: CPT

## 2019-01-09 PROBLEM — Z87.01 HISTORY OF PNEUMONIA: Status: RESOLVED | Noted: 2018-03-20 | Resolved: 2019-01-09

## 2019-01-09 PROBLEM — R91.8 LUNG MASS: Status: RESOLVED | Noted: 2018-02-14 | Resolved: 2019-01-09

## 2019-01-15 ENCOUNTER — APPOINTMENT (OUTPATIENT)
Dept: THORACIC SURGERY | Facility: CLINIC | Age: 74
End: 2019-01-15
Payer: MEDICARE

## 2019-01-15 VITALS
DIASTOLIC BLOOD PRESSURE: 70 MMHG | RESPIRATION RATE: 17 BRPM | WEIGHT: 145 LBS | BODY MASS INDEX: 24.13 KG/M2 | TEMPERATURE: 97.5 F | HEART RATE: 70 BPM | SYSTOLIC BLOOD PRESSURE: 134 MMHG | OXYGEN SATURATION: 97 %

## 2019-01-15 DIAGNOSIS — Z87.01 PERSONAL HISTORY OF PNEUMONIA (RECURRENT): ICD-10-CM

## 2019-01-15 DIAGNOSIS — R91.8 OTHER NONSPECIFIC ABNORMAL FINDING OF LUNG FIELD: ICD-10-CM

## 2019-01-15 DIAGNOSIS — Z87.898 PERSONAL HISTORY OF OTHER SPECIFIED CONDITIONS: ICD-10-CM

## 2019-01-15 LAB
ANION GAP SERPL CALC-SCNC: 12 MMOL/L
BUN SERPL-MCNC: 11 MG/DL
CALCIUM SERPL-MCNC: 9.1 MG/DL
CHLORIDE SERPL-SCNC: 104 MMOL/L
CO2 SERPL-SCNC: 27 MMOL/L
CREAT SERPL-MCNC: 0.97 MG/DL
GLUCOSE SERPL-MCNC: 107 MG/DL
POTASSIUM SERPL-SCNC: 3.9 MMOL/L
PSA FREE FLD-MCNC: 33 %
PSA FREE SERPL-MCNC: 1.33 NG/ML
PSA SERPL-MCNC: 4.03 NG/ML
SODIUM SERPL-SCNC: 143 MMOL/L

## 2019-01-15 PROCEDURE — 99213 OFFICE O/P EST LOW 20 MIN: CPT

## 2019-01-15 RX ORDER — CHROMIUM 200 MCG
TABLET ORAL
Refills: 0 | Status: ACTIVE | COMMUNITY

## 2019-01-15 RX ORDER — PREDNISOLONE ACETATE 10 MG/ML
1 SUSPENSION/ DROPS OPHTHALMIC 4 TIMES DAILY
Qty: 1 | Refills: 1 | Status: COMPLETED | COMMUNITY
Start: 2018-09-12 | End: 2019-01-15

## 2019-01-15 RX ORDER — OMEGA-3-ACID ETHYL ESTERS CAPSULES 1 G/1
1 CAPSULE, LIQUID FILLED ORAL
Refills: 0 | Status: COMPLETED | COMMUNITY
End: 2019-01-15

## 2019-01-15 RX ORDER — CALCIUM CITRATE/VITAMIN D3 315MG-6.25
TABLET ORAL
Refills: 0 | Status: ACTIVE | COMMUNITY

## 2019-01-15 RX ORDER — FLUTICASONE FUROATE AND VILANTEROL TRIFENATATE 100; 25 UG/1; UG/1
100-25 POWDER RESPIRATORY (INHALATION)
Refills: 0 | Status: ACTIVE | COMMUNITY

## 2019-01-15 NOTE — PHYSICAL EXAM
[Auscultation Breath Sounds / Voice Sounds] : lungs were clear to auscultation bilaterally [Heart Rate And Rhythm] : heart rate was normal and rhythm regular [Heart Sounds] : normal S1 and S2 [Heart Sounds Gallop] : no gallops [Murmurs] : no murmurs [Heart Sounds Pericardial Friction Rub] : no pericardial rub [Examination Of The Chest] : the chest was normal in appearance [Chest Visual Inspection Thoracic Asymmetry] : no chest asymmetry [Diminished Respiratory Excursion] : normal chest expansion [2+] : left 2+ [Bowel Sounds] : normal bowel sounds [Abdomen Soft] : soft [Abdomen Tenderness] : non-tender [Abdomen Mass (___ Cm)] : no abdominal mass palpated [Cervical Lymph Nodes Enlarged Posterior Bilaterally] : posterior cervical [Cervical Lymph Nodes Enlarged Anterior Bilaterally] : anterior cervical [Supraclavicular Lymph Nodes Enlarged Bilaterally] : supraclavicular [No CVA Tenderness] : no ~M costovertebral angle tenderness [No Spinal Tenderness] : no spinal tenderness [Abnormal Walk] : normal gait [Nail Clubbing] : no clubbing  or cyanosis of the fingernails [Musculoskeletal - Swelling] : no joint swelling seen [Motor Tone] : muscle strength and tone were normal [Skin Color & Pigmentation] : normal skin color and pigmentation [Skin Turgor] : normal skin turgor [] : no rash [Deep Tendon Reflexes (DTR)] : deep tendon reflexes were 2+ and symmetric [Sensation] : the sensory exam was normal to light touch and pinprick [No Focal Deficits] : no focal deficits [Oriented To Time, Place, And Person] : oriented to person, place, and time [Impaired Insight] : insight and judgment were intact [Affect] : the affect was normal

## 2019-01-18 NOTE — CONSULT LETTER
[Dear  ___] : Dear  [unfilled], [( Thank you for referring [unfilled] for consultation for _____ )] : Thank you for referring [unfilled] for consultation for [unfilled] [Please see my note below.] : Please see my note below. [Consult Closing:] : Thank you very much for allowing me to participate in the care of this patient.  If you have any questions, please do not hesitate to contact me. [Sincerely,] : Sincerely, [DrRobert  ___] : Dr. MCKEON [DrRobert ___] : Dr. MCKEON [Courtesy Letter:] : I had the pleasure of seeing your patient, [unfilled], in my office today. [FreeTextEntry2] : Paul Jackman MD (Hem/Onc/Referring)\par Dionicio Jackman MD (PCP)\par Preet Garcia MD (Pul)  [FreeTextEntry3] : Aiden Maier MD, MPH \par System Director of Thoracic Surgery \par Director of Comprehensive Lung and Foregut [place] \par Professor Cardiovascular & Thoracic Surgery  \par [employer] at [facility]\par

## 2019-01-18 NOTE — HISTORY OF PRESENT ILLNESS
[FreeTextEntry1] : 74 y/o M, never smoker, w/ hx of HLD, DALIA on CPAP at night, impaired fasting glucose (diet control), personal hx of TB at age 18 (treated for 18 months in [place]), and lung cancer. \par \par Now ~11mo s/p FB, Navigational bronch, FNA of RLL, Rt VATS Robotic-assisted, RLL wedge rxn, RLLobectomy, MLND on 2/28/18. 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 BAL of RLL +AdenoCA. BAL of ALEXANDREA negative for malignancy.\par Post-op complicated w/ bronchospasm, subcutaneous emphysema, pneumonia and A-fib (treated w/ Cardizem).\par  \par CT Chest on 6/20/2018:\par - stable 3mm RUL nodule\par - Small loculated right-sided pleural effusion\par - severe left apical scarring w/ traction bronchiectasis, stable\par \par CT Chest on 1/11/19:\par - post-op changes\par - a stable 3mm RUL nodule (series 3 image 28)\par - a stable 4mm subpleural LLL nodule\par - small loculated Rt basal pleural effusion, stable\par - moderate  left apical scarring w/ traction bronchiectasis\par \par Patient is here today for a follow up. Pt admits SOB on exertion and has been following up with his cardiologist for his A-fib. Denies CP or cough.

## 2019-01-18 NOTE — ASSESSMENT
[FreeTextEntry1] : 74 y/o M, never smoker, w/ hx of HLD, DALIA on CPAP at night, impaired fasting glucose (diet control), personal hx of TB at age 18 (treated for 18 months in [place]), and lung cancer. \par \par Now ~11mo s/p FB, Navigational bronch, FNA of RLL, Rt VATS Robotic-assisted, RLL wedge rxn, RLLobectomy, MLND on 2/28/18. 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 BAL of RLL +AdenoCA. BAL of ALEXANDREA negative for malignancy.\par Post-op complicated w/ bronchospasm, subcutaneous emphysema, pneumonia and A-fib (treated w/ Cardizem).\par  \par CT Chest on 1/11/19:\par - post-op changes\par - a stable 3mm RUL nodule (series 3 image 28)\par - a stable 4mm subpleural LLL nodule\par - small loculated Rt basal pleural effusion, stable\par - moderate  left apical scarring w/ traction bronchiectasis\par \par I have reviewed the patient's medical records and diagnostic images at time of this office consultation and have made the following recommendation:\par 1. Stable chest CT, RTC in 6 months with CT chest w/o contrast. \par \par Written by Dacia Sidhu NP, acting as a scribe for Dr. Aiden Paul. \par \par The documentation recorded by the scribe accurately reflects the service I personally performed and the decisions made by me. AIDEN PAUL MD

## 2019-01-18 NOTE — DATA REVIEWED
[FreeTextEntry1] : CT Chest on 1/11/19:\par - post-op changes\par - a stable 3mm RUL nodule (series 3 image 28)\par - a stable 4mm subpleural LLL nodule\par - small loculated Rt basal pleural effusion, stable\par - moderate  left apical scarring w/ traction bronchiectasis

## 2019-02-28 NOTE — PATIENT PROFILE ADULT. - CENTRAL VENOUS CATHETER
Subjective:       Patient ID: Duke Baltazar no

## 2019-03-01 ENCOUNTER — APPOINTMENT (OUTPATIENT)
Dept: OPHTHALMOLOGY | Facility: CLINIC | Age: 74
End: 2019-03-01
Payer: MEDICARE

## 2019-03-01 DIAGNOSIS — H35.373 PUCKERING OF MACULA, BILATERAL: ICD-10-CM

## 2019-03-01 PROCEDURE — 92014 COMPRE OPH EXAM EST PT 1/>: CPT

## 2019-03-01 PROCEDURE — 92134 CPTRZ OPH DX IMG PST SGM RTA: CPT

## 2019-07-09 NOTE — H&P PST ADULT - RESPIRATORY RATE (BREATHS/MIN)
Darianm for pt to give her surgery information. Will try again later    
Spoke w patient and gave surgery information. Patient verbalized understanding and will call with any questions or concerns       Patients time or arrival 0700  Wire/nuc med 8/6 at 1430  OR time 0900  
17

## 2019-07-18 ENCOUNTER — APPOINTMENT (OUTPATIENT)
Dept: THORACIC SURGERY | Facility: CLINIC | Age: 74
End: 2019-07-18
Payer: MEDICARE

## 2019-07-18 VITALS
DIASTOLIC BLOOD PRESSURE: 68 MMHG | TEMPERATURE: 97.4 F | WEIGHT: 145 LBS | BODY MASS INDEX: 24.13 KG/M2 | HEART RATE: 77 BPM | RESPIRATION RATE: 18 BRPM | SYSTOLIC BLOOD PRESSURE: 108 MMHG | OXYGEN SATURATION: 98 %

## 2019-07-18 PROCEDURE — 99213 OFFICE O/P EST LOW 20 MIN: CPT

## 2019-07-22 NOTE — CONSULT LETTER
[Dear  ___] : Dear  [unfilled], [Consult Letter:] : I had the pleasure of evaluating your patient, [unfilled]. [( Thank you for referring [unfilled] for consultation for _____ )] : Thank you for referring [unfilled] for consultation for [unfilled] [Please see my note below.] : Please see my note below. [Consult Closing:] : Thank you very much for allowing me to participate in the care of this patient.  If you have any questions, please do not hesitate to contact me. [Sincerely,] : Sincerely, [DrRobert  ___] : Dr. MCKEON [DrRobert ___] : Dr. MCKEON [FreeTextEntry3] : Aiden Maier MD, MPH \par System Director of Thoracic Surgery \par Director of Comprehensive Lung and Foregut [place] \par Professor Cardiovascular & Thoracic Surgery  \par [facility] [employer] at [facility]\par  [FreeTextEntry2] : Paul Jackman MD (Hem/Onc/Referring)\par Dionicio Jackman MD (PCP)\par Preet Garcia MD (Pul) \par

## 2019-07-22 NOTE — ASSESSMENT
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 \par \par Now 1yr 5mo s/p FB, Navigational bronch, FNA of RLL, Rt VATS Robotic-assisted, RLL wedge rxn, RLLobectomy, MLND on 2/28/18. 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 BAL of RLL +AdenoCA. BAL of ALEXANDREA negative for malignancy.\par Post-op complicated w/ bronchospasm, subcutaneous emphysema, pneumonia and A-fib (treated w/ Cardizem).\par  \par CT Chest on 7/12/19:\par - post-op changes\par - stable 3mm nodule in RUL\par - stable 4mm subpleural nodule LLL\par - minimal loculated Rt pleural effusion, decreased\par - a Rt renal calculus 4 mm \par \par I have reviewed the patient's medical records and diagnostic images at time of this office consultation and have made the following recommendation:\par 1. Stable lung nodules, RTC in 6 mons with CT Chest w/o contrast. \par \par \par Written by Dacia Sidhu NP, acting as a scribe for Dr. Aiden Paul. \par \par The documentation recorded by the scribe accurately reflects the service I personally performed and the decisions made by me. AIDEN PAUL MD\par

## 2019-07-22 NOTE — HISTORY OF PRESENT ILLNESS
[FreeTextEntry1] : 73 y/o M, never smoker, w/ hx of HLD, DALIA on CPAP at night, impaired fasting glucose (diet control), personal hx of TB at age 18 (treated for 18 months in [place]), and lung cancer. \par \par Now 1yr 5mo s/p FB, Navigational bronch, FNA of RLL, Rt VATS Robotic-assisted, RLL wedge rxn, RLLobectomy, MLND on 2/28/18. 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 BAL of RLL +AdenoCA. BAL of ALEXANDREA negative for malignancy.\par Post-op complicated w/ bronchospasm, subcutaneous emphysema, pneumonia and A-fib (treated w/ Cardizem).\par  \par CT Chest on 6/20/2018:\par - stable 3mm RUL nodule\par - Small loculated right-sided pleural effusion\par - severe left apical scarring w/ traction bronchiectasis, stable\par \par CT Chest on 1/11/19:\par - post-op changes\par - a stable 3mm RUL nodule (series 3 image 28)\par - a stable 4mm subpleural LLL nodule\par - small loculated Rt basal pleural effusion, stable\par - moderate left apical scarring w/ traction bronchiectasis\par \par CT Chest on 7/12/19:\par - post-op changes\par - stable 3mm nodule in RUL\par - stable 4mm subpleural nodule LLL\par - minimal loculated Rt pleural effusion, decreased\par - a Rt renal calculus 4 mm \par \par Pt presents today for follow up. Pt reports cough, CP or SOB. As per pt, he had coronary angioplasty done recently, recovered well. \par

## 2019-07-22 NOTE — DATA REVIEWED
[FreeTextEntry1] : CT Chest on 7/12/19:\par - post-op changes\par - stable 3mm nodule in RUL\par - stable 4mm subpleural nodule LLL\par - minimal loculated Rt pleural effusion, decreased\par - a Rt renal calculus 4 mm

## 2019-08-09 ENCOUNTER — APPOINTMENT (OUTPATIENT)
Dept: UROLOGY | Facility: CLINIC | Age: 74
End: 2019-08-09
Payer: MEDICARE

## 2019-08-09 VITALS
WEIGHT: 144 LBS | OXYGEN SATURATION: 96 % | TEMPERATURE: 98.2 F | DIASTOLIC BLOOD PRESSURE: 79 MMHG | HEART RATE: 81 BPM | RESPIRATION RATE: 17 BRPM | BODY MASS INDEX: 23.96 KG/M2 | SYSTOLIC BLOOD PRESSURE: 141 MMHG

## 2019-08-09 DIAGNOSIS — H26.493 OTHER SECONDARY CATARACT, BILATERAL: ICD-10-CM

## 2019-08-09 PROCEDURE — 99213 OFFICE O/P EST LOW 20 MIN: CPT

## 2019-08-09 NOTE — ADDENDUM
[FreeTextEntry1] : Entered by Merrick Campbell, acting as scribe for Dr. Matt Hoyos.\par \par The documentation recorded by the scribe accurately reflects the service I personally performed and the decisions made by me.

## 2019-08-09 NOTE — LETTER BODY
[FreeTextEntry1] : Dionicio Jackman MD\par [address]\[place] [address]\par [phone]\par [phone]\par \par Dear Dr. Jackman\par \par Reason for visit: Elevated PSA. BPH with stable urinary symptoms. Nocturia. \par \par This is a 74 year-old gentleman with atrial fibrillation, presenting with elevated PSA and BPH status post previous Greenlight laser vaporization of prostate in 2013. He previously underwent a CT scan at [facility] demonstrating small kidney stone measuring up to 2 mm. Patient has undergone lung surgery without chemotherapy. The patient returns today for follow up. Since his last visit, the patient reports of return in urinary symptoms. He complains of nocturia 6x per night and headache as a side effect of Oxybutynin 5 mg.  Patient denies any urinary retention or hematuria or changes in health. Patient has no pain. The past medical history and family history and social history are unchanged. All other review of systems are negative. Patient denies any changes in medications. Medication list was reconciled.\par \par On examination, the patient is an elderly-appearing gentleman in no acute distress. He is alert and oriented follows commands. He has normal mood and affect. He is normocephalic. Oral no thrush. Neck is supple. Respirations are unlabored. His abdomen is soft and nontender. Liver is nonpalpable. Bladder is nonpalpable. No CVA tenderness. Neurologically he is grossly intact. No peripheral edema. Skin without gross abnormality. \par \par CT scan images in 2018 demonstrated small renal calculus measuring up to 2 mm. There no evidence of solid masses or hydronephrosis. \par \par His BMP demonstrated normal renal functions, creatinine 0.97. His PSA was 4.03, which is slightly elevated but stable. His previous PSA was 3.35.\par \par Assessment: Elevated PSA. BPH. Nocturia. \par \par I counseled the patient. In terms of his elevated PSA, his PSA is stable. In terms of his BPH, his symptoms have returned despite medical therapy. I advise the patient to discontinue medication. I recommend the patient obtain urodynamics testing with cystoscopy to further evaluate. I counseled the patient regarding the procedure. The risks and benefits were discussed. Alternatives were given. I answered the patient questions. The patient will take the necessary preparations for the procedure. The patient will follow-up as directed and will contact me with any questions or concerns. Thank you for the opportunity to participate in the care of Mr. BARRIENTOS. I will keep you updated on his progress. \par \par Plan: DC Oxybutynin 5 mg. Urodynamics w/ cystoscopy. Follow up in 1 year.

## 2019-08-26 ENCOUNTER — APPOINTMENT (OUTPATIENT)
Dept: UROLOGY | Facility: CLINIC | Age: 74
End: 2019-08-26
Payer: MEDICARE

## 2019-08-26 VITALS
SYSTOLIC BLOOD PRESSURE: 113 MMHG | HEART RATE: 90 BPM | TEMPERATURE: 98.1 F | WEIGHT: 143 LBS | BODY MASS INDEX: 23.8 KG/M2 | OXYGEN SATURATION: 96 % | RESPIRATION RATE: 17 BRPM | DIASTOLIC BLOOD PRESSURE: 68 MMHG

## 2019-08-26 PROCEDURE — 99214 OFFICE O/P EST MOD 30 MIN: CPT

## 2019-08-26 NOTE — ASSESSMENT
[FreeTextEntry1] : Patient is a 75 yo M who presents for gross hematuria.\par Possibly related to his BPH.\par \par However first episode of gross hematuria and has never undergone hematuria evaluation. Discussed with pt the implications of hematuria and need to further evaluate to rule out potentially dangerous or malignant underlying etiologies.  Discussed with pt need for upper tract imaging and cystoscopy.\par CT urogram ordered.\par Urine studies.\par F/u for cystoscopy with myself or Dr Hoyos.

## 2019-08-26 NOTE — PHYSICAL EXAM
[General Appearance - Well Developed] : well developed [Normal Appearance] : normal appearance [General Appearance - Well Nourished] : well nourished [General Appearance - In No Acute Distress] : no acute distress [Well Groomed] : well groomed [Abdomen Soft] : soft [Abdomen Tenderness] : non-tender [Costovertebral Angle Tenderness] : no ~M costovertebral angle tenderness [Abdomen Mass (___ Cm)] : no abdominal mass palpated [Urethral Meatus] : meatus normal [Urinary Bladder Findings] : the bladder was normal on palpation [Scrotum] : the scrotum was normal [Testes Tenderness] : no tenderness of the testes [Testes Mass (___cm)] : there were no testicular masses

## 2019-08-27 ENCOUNTER — EMERGENCY (EMERGENCY)
Facility: HOSPITAL | Age: 74
LOS: 1 days | Discharge: HOME | End: 2019-08-27
Attending: EMERGENCY MEDICINE | Admitting: EMERGENCY MEDICINE
Payer: MEDICARE

## 2019-08-27 VITALS
HEART RATE: 71 BPM | RESPIRATION RATE: 15 BRPM | TEMPERATURE: 99 F | OXYGEN SATURATION: 95 % | WEIGHT: 139.99 LBS | HEIGHT: 66 IN | DIASTOLIC BLOOD PRESSURE: 89 MMHG | SYSTOLIC BLOOD PRESSURE: 154 MMHG

## 2019-08-27 VITALS
OXYGEN SATURATION: 98 % | TEMPERATURE: 98 F | SYSTOLIC BLOOD PRESSURE: 127 MMHG | RESPIRATION RATE: 15 BRPM | HEART RATE: 71 BPM | DIASTOLIC BLOOD PRESSURE: 76 MMHG

## 2019-08-27 DIAGNOSIS — Z98.49 CATARACT EXTRACTION STATUS, UNSPECIFIED EYE: Chronic | ICD-10-CM

## 2019-08-27 DIAGNOSIS — Z98.890 OTHER SPECIFIED POSTPROCEDURAL STATES: Chronic | ICD-10-CM

## 2019-08-27 LAB
ALBUMIN SERPL ELPH-MCNC: 3.9 G/DL — SIGNIFICANT CHANGE UP (ref 3.3–5)
ALP SERPL-CCNC: 64 U/L — SIGNIFICANT CHANGE UP (ref 30–120)
ALT FLD-CCNC: 34 U/L DA — SIGNIFICANT CHANGE UP (ref 10–60)
ANION GAP SERPL CALC-SCNC: 9 MMOL/L — SIGNIFICANT CHANGE UP (ref 5–17)
APPEARANCE UR: CLEAR — SIGNIFICANT CHANGE UP
APTT BLD: 30.1 SEC — SIGNIFICANT CHANGE UP (ref 28.5–37)
AST SERPL-CCNC: 18 U/L — SIGNIFICANT CHANGE UP (ref 10–40)
BACTERIA # UR AUTO: ABNORMAL
BASOPHILS # BLD AUTO: 0.03 K/UL — SIGNIFICANT CHANGE UP (ref 0–0.2)
BASOPHILS NFR BLD AUTO: 0.4 % — SIGNIFICANT CHANGE UP (ref 0–2)
BILIRUB SERPL-MCNC: 0.4 MG/DL — SIGNIFICANT CHANGE UP (ref 0.2–1.2)
BILIRUB UR-MCNC: NEGATIVE — SIGNIFICANT CHANGE UP
BUN SERPL-MCNC: 15 MG/DL — SIGNIFICANT CHANGE UP (ref 7–23)
CALCIUM SERPL-MCNC: 8.9 MG/DL — SIGNIFICANT CHANGE UP (ref 8.4–10.5)
CHLORIDE SERPL-SCNC: 106 MMOL/L — SIGNIFICANT CHANGE UP (ref 96–108)
CO2 SERPL-SCNC: 28 MMOL/L — SIGNIFICANT CHANGE UP (ref 22–31)
COLOR SPEC: YELLOW — SIGNIFICANT CHANGE UP
CREAT SERPL-MCNC: 0.82 MG/DL — SIGNIFICANT CHANGE UP (ref 0.5–1.3)
DIFF PNL FLD: ABNORMAL
EOSINOPHIL # BLD AUTO: 0.24 K/UL — SIGNIFICANT CHANGE UP (ref 0–0.5)
EOSINOPHIL NFR BLD AUTO: 3.1 % — SIGNIFICANT CHANGE UP (ref 0–6)
EPI CELLS # UR: NEGATIVE — SIGNIFICANT CHANGE UP
GLUCOSE SERPL-MCNC: 110 MG/DL — HIGH (ref 70–99)
GLUCOSE UR QL: NEGATIVE MG/DL — SIGNIFICANT CHANGE UP
HCT VFR BLD CALC: 42.9 % — SIGNIFICANT CHANGE UP (ref 39–50)
HGB BLD-MCNC: 13.7 G/DL — SIGNIFICANT CHANGE UP (ref 13–17)
IMM GRANULOCYTES NFR BLD AUTO: 0.3 % — SIGNIFICANT CHANGE UP (ref 0–1.5)
INR BLD: 0.99 RATIO — SIGNIFICANT CHANGE UP (ref 0.88–1.16)
KETONES UR-MCNC: NEGATIVE — SIGNIFICANT CHANGE UP
LEUKOCYTE ESTERASE UR-ACNC: ABNORMAL
LYMPHOCYTES # BLD AUTO: 2.44 K/UL — SIGNIFICANT CHANGE UP (ref 1–3.3)
LYMPHOCYTES # BLD AUTO: 31.1 % — SIGNIFICANT CHANGE UP (ref 13–44)
MCHC RBC-ENTMCNC: 28.1 PG — SIGNIFICANT CHANGE UP (ref 27–34)
MCHC RBC-ENTMCNC: 31.9 GM/DL — LOW (ref 32–36)
MCV RBC AUTO: 87.9 FL — SIGNIFICANT CHANGE UP (ref 80–100)
MONOCYTES # BLD AUTO: 0.69 K/UL — SIGNIFICANT CHANGE UP (ref 0–0.9)
MONOCYTES NFR BLD AUTO: 8.8 % — SIGNIFICANT CHANGE UP (ref 2–14)
NEUTROPHILS # BLD AUTO: 4.43 K/UL — SIGNIFICANT CHANGE UP (ref 1.8–7.4)
NEUTROPHILS NFR BLD AUTO: 56.3 % — SIGNIFICANT CHANGE UP (ref 43–77)
NITRITE UR-MCNC: NEGATIVE — SIGNIFICANT CHANGE UP
NRBC # BLD: 0 /100 WBCS — SIGNIFICANT CHANGE UP (ref 0–0)
PH UR: 6.5 — SIGNIFICANT CHANGE UP (ref 5–8)
PLATELET # BLD AUTO: 205 K/UL — SIGNIFICANT CHANGE UP (ref 150–400)
POTASSIUM SERPL-MCNC: 3.5 MMOL/L — SIGNIFICANT CHANGE UP (ref 3.5–5.3)
POTASSIUM SERPL-SCNC: 3.5 MMOL/L — SIGNIFICANT CHANGE UP (ref 3.5–5.3)
PROT SERPL-MCNC: 8.4 G/DL — HIGH (ref 6–8.3)
PROT UR-MCNC: 15 MG/DL
PROTHROM AB SERPL-ACNC: 10.8 SEC — SIGNIFICANT CHANGE UP (ref 10–12.9)
RBC # BLD: 4.88 M/UL — SIGNIFICANT CHANGE UP (ref 4.2–5.8)
RBC # FLD: 14.6 % — HIGH (ref 10.3–14.5)
RBC CASTS # UR COMP ASSIST: ABNORMAL /HPF (ref 0–4)
SODIUM SERPL-SCNC: 143 MMOL/L — SIGNIFICANT CHANGE UP (ref 135–145)
SP GR SPEC: 1.01 — SIGNIFICANT CHANGE UP (ref 1.01–1.02)
UROBILINOGEN FLD QL: NEGATIVE MG/DL — SIGNIFICANT CHANGE UP
WBC # BLD: 7.85 K/UL — SIGNIFICANT CHANGE UP (ref 3.8–10.5)
WBC # FLD AUTO: 7.85 K/UL — SIGNIFICANT CHANGE UP (ref 3.8–10.5)
WBC UR QL: ABNORMAL

## 2019-08-27 PROCEDURE — 80053 COMPREHEN METABOLIC PANEL: CPT

## 2019-08-27 PROCEDURE — 81001 URINALYSIS AUTO W/SCOPE: CPT

## 2019-08-27 PROCEDURE — 85610 PROTHROMBIN TIME: CPT

## 2019-08-27 PROCEDURE — 85730 THROMBOPLASTIN TIME PARTIAL: CPT

## 2019-08-27 PROCEDURE — 74176 CT ABD & PELVIS W/O CONTRAST: CPT | Mod: 26

## 2019-08-27 PROCEDURE — 87086 URINE CULTURE/COLONY COUNT: CPT

## 2019-08-27 PROCEDURE — 99284 EMERGENCY DEPT VISIT MOD MDM: CPT | Mod: 25

## 2019-08-27 PROCEDURE — 74176 CT ABD & PELVIS W/O CONTRAST: CPT

## 2019-08-27 PROCEDURE — 36415 COLL VENOUS BLD VENIPUNCTURE: CPT

## 2019-08-27 PROCEDURE — 99285 EMERGENCY DEPT VISIT HI MDM: CPT

## 2019-08-27 PROCEDURE — 85027 COMPLETE CBC AUTOMATED: CPT

## 2019-08-27 RX ORDER — MOXIFLOXACIN HYDROCHLORIDE TABLETS, 400 MG 400 MG/1
1 TABLET, FILM COATED ORAL
Qty: 14 | Refills: 0
Start: 2019-08-27 | End: 2019-09-02

## 2019-08-27 RX ORDER — SODIUM CHLORIDE 9 MG/ML
1000 INJECTION INTRAMUSCULAR; INTRAVENOUS; SUBCUTANEOUS ONCE
Refills: 0 | Status: COMPLETED | OUTPATIENT
Start: 2019-08-27 | End: 2019-08-27

## 2019-08-27 RX ORDER — CIPROFLOXACIN LACTATE 400MG/40ML
500 VIAL (ML) INTRAVENOUS ONCE
Refills: 0 | Status: COMPLETED | OUTPATIENT
Start: 2019-08-27 | End: 2019-08-27

## 2019-08-27 RX ADMIN — Medication 500 MILLIGRAM(S): at 23:16

## 2019-08-27 RX ADMIN — SODIUM CHLORIDE 1000 MILLILITER(S): 9 INJECTION INTRAMUSCULAR; INTRAVENOUS; SUBCUTANEOUS at 22:07

## 2019-08-27 NOTE — ED PROVIDER NOTE - OBJECTIVE STATEMENT
73 y/o male with a PMHx of lung CA, DALIA on CPAP, TB, fatty liver, BPH, asthma presents to the ED c/o hematuria x 3 days. Today noticed there was more blood, more like clots. Denies fever, pain, nausea, vomiting, back pain, abd pain, urinary retention. Saw a urologist and had UA done, has an appointment with urologist tomorrow and appointment for cystoscopy 9/5. Per daughter in law, pt had a prostate surgery 5 years ago for enlarged prostate. No blood thinners. Spoke to Dr. Ayala, unattached. States admission to be placed under [facility]. Xray questionable for fibula fx asked ortho to be called. Spoke to ortho resident regarding pt who states will speak to darius Panda PA-C 75 y/o male with a PMHx of lung CA, DALIA on CPAP, TB, fatty liver, BPH, asthma presents to the ED c/o hematuria x 3 days. Today noticed there was more blood, more like clots. Denies fever, pain, nausea, vomiting, back pain, abd pain, urinary retention. Saw a urologist and had UA done, has an appointment with urologist tomorrow and appointment for cystoscopy 9/5. Per daughter in law, pt had a prostate surgery 5 years ago for enlarged prostate. No blood thinners.  Daughter in law translated for pt upon pt request.

## 2019-08-27 NOTE — ED PROVIDER NOTE - CLINICAL SUMMARY MEDICAL DECISION MAKING FREE TEXT BOX
pt with hematuria, no other sx, has cystoscopy scheduled for next week, no sx of retention, will check labs and get CT to r/o stones.

## 2019-08-27 NOTE — ED PROVIDER NOTE - NS_ ATTENDINGSCRIBEDETAILS _ED_A_ED_FT
Alex Lucero MD - The scribe's documentation has been prepared under my direction and personally reviewed by me in its entirety. I confirm that the note above accurately reflects all work, treatment, procedures, and medical decision making performed by me.

## 2019-08-27 NOTE — ED PROVIDER NOTE - PATIENT PORTAL LINK FT
You can access the FollowMyHealth Patient Portal offered by [facility] by registering at the following website: [email or web address]. By joining [facility]’s FollowMyHealth portal, you will also be able to view your health information using other applications (apps) compatible with our system.

## 2019-08-28 ENCOUNTER — APPOINTMENT (OUTPATIENT)
Age: 74
End: 2019-08-28
Payer: MEDICARE

## 2019-08-28 VITALS
TEMPERATURE: 97.9 F | BODY MASS INDEX: 24.13 KG/M2 | RESPIRATION RATE: 17 BRPM | WEIGHT: 145 LBS | OXYGEN SATURATION: 97 % | DIASTOLIC BLOOD PRESSURE: 74 MMHG | HEART RATE: 72 BPM | SYSTOLIC BLOOD PRESSURE: 117 MMHG

## 2019-08-28 PROCEDURE — 99214 OFFICE O/P EST MOD 30 MIN: CPT

## 2019-08-28 RX ORDER — AMOXICILLIN AND CLAVULANATE POTASSIUM 500; 125 MG/1; MG/1
500-125 TABLET, FILM COATED ORAL
Qty: 14 | Refills: 0 | Status: ACTIVE | COMMUNITY
Start: 2019-08-28 | End: 1900-01-01

## 2019-08-28 NOTE — PHYSICAL EXAM
[General Appearance - Well Developed] : well developed [Normal Appearance] : normal appearance [General Appearance - Well Nourished] : well nourished [General Appearance - In No Acute Distress] : no acute distress [Well Groomed] : well groomed [Abdomen Tenderness] : non-tender [Abdomen Soft] : soft [Costovertebral Angle Tenderness] : no ~M costovertebral angle tenderness [Urinary Bladder Findings] : the bladder was normal on palpation [Edema] : no peripheral edema [Respiration, Rhythm And Depth] : normal respiratory rhythm and effort [] : no respiratory distress [Oriented To Time, Place, And Person] : oriented to person, place, and time [Exaggerated Use Of Accessory Muscles For Inspiration] : no accessory muscle use [Affect] : the affect was normal [Mood] : the mood was normal [Normal Station and Gait] : the gait and station were normal for the patient's age [Not Anxious] : not anxious [No Focal Deficits] : no focal deficits [No Palpable Adenopathy] : no palpable adenopathy

## 2019-08-28 NOTE — ASSESSMENT
[FreeTextEntry1] : 75 yo M with gross hematuria, UTI\par \par - Reviewed CT  from ER visit and confirmed findings\par - Cipro changed over to augmentin based on culture results\par - Discussed pros and cons of cysto today. Given CT findings and culture results, would not recommend it at this time. FU next week with Dr. Tolbert for office cystoscopy

## 2019-08-28 NOTE — HISTORY OF PRESENT ILLNESS
[FreeTextEntry1] : 73 yo M previously seen by both Dr. Hoyos and Dr. Tolbert\par Has been having intermittent gross hematuria\par Continues to have hematuria. PCP started him on CIpro 2 days ago\par Pt went to the ED yesterday and got a CT stone hunt which showed a small nonobstructing right renal stone as well as periprostatic stranding and inflammation\par Denies any difficulty urinating and labwork from ER visit yesterday was normal\par Denies any fever, chills, significant dysuria\par

## 2019-08-29 ENCOUNTER — APPOINTMENT (OUTPATIENT)
Age: 74
End: 2019-08-29

## 2019-08-29 LAB
APPEARANCE: ABNORMAL
BACTERIA UR CULT: ABNORMAL
BACTERIA: ABNORMAL
BILIRUBIN URINE: NEGATIVE
BLOOD URINE: ABNORMAL
COLOR: ABNORMAL
CULTURE RESULTS: SIGNIFICANT CHANGE UP
GLUCOSE QUALITATIVE U: NEGATIVE
HYALINE CASTS: 0 /LPF
KETONES URINE: NEGATIVE
LEUKOCYTE ESTERASE URINE: ABNORMAL
MICROSCOPIC-UA: NORMAL
NITRITE URINE: NEGATIVE
PH URINE: 6
PROTEIN URINE: ABNORMAL
RED BLOOD CELLS URINE: >720 /HPF
SPECIFIC GRAVITY URINE: 1.02
SPECIMEN SOURCE: SIGNIFICANT CHANGE UP
SQUAMOUS EPITHELIAL CELLS: 0 /HPF
URINE CYTOLOGY: NORMAL
UROBILINOGEN URINE: NORMAL
WHITE BLOOD CELLS URINE: 104 /HPF

## 2019-09-01 ENCOUNTER — OUTPATIENT (OUTPATIENT)
Dept: OUTPATIENT SERVICES | Facility: HOSPITAL | Age: 74
LOS: 1 days | End: 2019-09-01
Payer: MEDICARE

## 2019-09-01 DIAGNOSIS — Z98.890 OTHER SPECIFIED POSTPROCEDURAL STATES: Chronic | ICD-10-CM

## 2019-09-01 DIAGNOSIS — Z98.49 CATARACT EXTRACTION STATUS, UNSPECIFIED EYE: Chronic | ICD-10-CM

## 2019-09-01 PROCEDURE — G9001: CPT

## 2019-09-05 ENCOUNTER — APPOINTMENT (OUTPATIENT)
Dept: UROLOGY | Facility: CLINIC | Age: 74
End: 2019-09-05
Payer: MEDICARE

## 2019-09-05 VITALS
TEMPERATURE: 97.7 F | DIASTOLIC BLOOD PRESSURE: 78 MMHG | BODY MASS INDEX: 23.96 KG/M2 | RESPIRATION RATE: 17 BRPM | OXYGEN SATURATION: 97 % | HEART RATE: 77 BPM | SYSTOLIC BLOOD PRESSURE: 123 MMHG | WEIGHT: 144 LBS

## 2019-09-05 DIAGNOSIS — N39.0 URINARY TRACT INFECTION, SITE NOT SPECIFIED: ICD-10-CM

## 2019-09-05 PROCEDURE — 99213 OFFICE O/P EST LOW 20 MIN: CPT

## 2019-09-05 NOTE — HISTORY OF PRESENT ILLNESS
[FreeTextEntry1] : Patient is a 75 yo M who presents with gross hematuria.  He has h/o BPH/LUTS followed by Dr Hoyos.  He came in for walk in appt due to gross hematuria last wk.  Painless, no dysuria or change in LUTS.  No difficulty or urinary retention.  No clots.  No incontinence.  No flank pain.  No fever/chills.\par \par He has h/o elevated PSA and prior greenlight laser vaporization of prostate.  He has regular chest CTs, previously noted small intrarenal stone.\par \par Urine culture was sent and showed GBS - treated with augmentin, however he has persistent gross hematuria.  He went to ER at [facility] and CT scan showed intrarenal small R stone and no hydro.  Prostate enlarged with mild stranding.\par \par He comes in today with gross hematuria and passed clots this morning, since then he has been voiding without clots and urine has lightened.  No fever or chills.  No dysuria or retention.  No flank.\par \par \par

## 2019-09-05 NOTE — ASSESSMENT
[FreeTextEntry1] : Patient is a 73 yo M who presents for gross hematuria.\par Possibly related to his BPH or UTI.\par PVR today is 0cc\par Urine color is red tea - see through\par He was treated for GBS - will repeat urine culture today to ensure clearance.\par He has CT urogram pending [place] [facility] to r/o etiology of hematuria, do not suspect stone on recent noncon CT as small and nonobstructing.\par He will f/u with Dr Hoyos next week for ucx results.\par He already has cysto and UDS pending with Dr Hoyos 9/23\par \par

## 2019-09-05 NOTE — PHYSICAL EXAM
[General Appearance - Well Developed] : well developed [General Appearance - Well Nourished] : well nourished [Normal Appearance] : normal appearance [Well Groomed] : well groomed [General Appearance - In No Acute Distress] : no acute distress [Abdomen Soft] : soft [Abdomen Tenderness] : non-tender [Costovertebral Angle Tenderness] : no ~M costovertebral angle tenderness [Urinary Bladder Findings] : the bladder was normal on palpation [Oriented To Time, Place, And Person] : oriented to person, place, and time [Affect] : the affect was normal [Mood] : the mood was normal [Not Anxious] : not anxious

## 2019-09-06 ENCOUNTER — APPOINTMENT (OUTPATIENT)
Age: 74
End: 2019-09-06

## 2019-09-06 LAB — BACTERIA UR CULT: NORMAL

## 2019-09-10 ENCOUNTER — APPOINTMENT (OUTPATIENT)
Dept: UROLOGY | Facility: CLINIC | Age: 74
End: 2019-09-10
Payer: MEDICARE

## 2019-09-10 VITALS
SYSTOLIC BLOOD PRESSURE: 124 MMHG | RESPIRATION RATE: 16 BRPM | OXYGEN SATURATION: 95 % | BODY MASS INDEX: 24.13 KG/M2 | HEART RATE: 78 BPM | WEIGHT: 145 LBS | TEMPERATURE: 97.8 F | DIASTOLIC BLOOD PRESSURE: 72 MMHG

## 2019-09-10 DIAGNOSIS — R31.0 GROSS HEMATURIA: ICD-10-CM

## 2019-09-10 PROBLEM — H26.493 BILATERAL POSTERIOR CAPSULAR OPACIFICATION: Status: ACTIVE | Noted: 2018-09-12

## 2019-09-10 PROCEDURE — 52000 CYSTOURETHROSCOPY: CPT

## 2019-09-10 PROCEDURE — 99214 OFFICE O/P EST MOD 30 MIN: CPT | Mod: 25

## 2019-09-10 RX ORDER — CIPROFLOXACIN HYDROCHLORIDE 500 MG/1
500 TABLET, FILM COATED ORAL
Refills: 0 | Status: COMPLETED | OUTPATIENT
Start: 2019-09-10

## 2019-09-10 RX ADMIN — CIPROFLOXACIN HYDROCHLORIDE 0 MG: 500 TABLET, FILM COATED ORAL at 00:00

## 2019-09-10 NOTE — LETTER BODY
[FreeTextEntry1] : Dionicio Jackman MD\par [address]\[place] [address]\par [phone]\par [phone]\par \par Dear Dr. Jackman\par \par Reason for visit: Elevated PSA. BPH with stable urinary symptoms. Gross hematuria. Renal stone.\par \par This is a 74 year-old gentleman with atrial fibrillation, presenting with elevated PSA and BPH status post previous Greenlight laser vaporization of prostate in 2013. He previously underwent a CT scan at [facility] demonstrating small kidney stone measuring up to 2 mm. Patient has undergone lung surgery without chemotherapy. The patient returns today for follow up. Since his last visit, the patient reports of gross hematuria. He recently obtained a CT scan demonstrated a slight growth in his renal stone. Patient has no pain. The past medical history and family history and social history are unchanged. All other review of systems are negative. Patient denies any changes in medications. Medication list was reconciled.\par \par On examination, the patient is an elderly-appearing gentleman in no acute distress. He is alert and oriented follows commands. He has normal mood and affect. He is normocephalic. Oral no thrush. Neck is supple. Respirations are unlabored. His abdomen is soft and nontender. Liver is nonpalpable. Bladder is nonpalpable. No CVA tenderness. Neurologically he is grossly intact. No peripheral edema. Skin without gross abnormality. \par \par CT scan images in 2018 demonstrated small renal calculus measuring up to 2 mm. There no evidence of solid masses or hydronephrosis. \par \par I personally reviewed CT abdomen and pelvis with the patient today. Images demonstrated nonobstructing right renal calculus measuring 3 mm. There were small bilateral renal cysts and small left renal cortical calcification. The scan also demonstrated enlarged prostate gland and small hepatic cysts.\par \par Assessment: Elevated PSA. BPH. Gross hematuria. Right renal stone. Bilateral renal cysts. Left renal calcification.\par \par I counseled the patient. In terms of his gross hematuria, I recommend he obtain urinalysis, fluid cytology, and cystoscopy today to further evaluate. I counseled the patient regarding the procedures. The risks and benefits were discussed. Alternatives were given. I answered the patient questions. Thank you for the opportunity to participate in the care of Mr. BARRIENTOS. I will keep you updated on his progress. \par \par Plan: Urinalysis. Cytology. Cystoscopy. Add Proscar. Follow up as directed.\par \par His cystoscopy today demonstrated irregular prostate growth and possible prostate adenoma. I recommend he begin a trial of Proscar. I discussed the potential side effects of the medication. I counseled the patient on its use and side effects. If the patient develops any side effects, the patient will discontinue the medication and contact me.

## 2019-09-17 DIAGNOSIS — Z71.89 OTHER SPECIFIED COUNSELING: ICD-10-CM

## 2019-09-17 LAB
APPEARANCE: CLEAR
BACTERIA: NEGATIVE
BILIRUBIN URINE: NEGATIVE
BLOOD URINE: ABNORMAL
COLOR: NORMAL
GLUCOSE QUALITATIVE U: NEGATIVE
HYALINE CASTS: 1 /LPF
KETONES URINE: NEGATIVE
LEUKOCYTE ESTERASE URINE: NEGATIVE
MICROSCOPIC-UA: NORMAL
NITRITE URINE: NEGATIVE
PH URINE: 6.5
PROTEIN URINE: NEGATIVE
RED BLOOD CELLS URINE: 29 /HPF
SPECIFIC GRAVITY URINE: 1.01
SQUAMOUS EPITHELIAL CELLS: 1 /HPF
URINE CYTOLOGY: NORMAL
UROBILINOGEN URINE: NORMAL
WHITE BLOOD CELLS URINE: 1 /HPF

## 2019-09-23 ENCOUNTER — APPOINTMENT (OUTPATIENT)
Dept: UROLOGY | Facility: CLINIC | Age: 74
End: 2019-09-23

## 2020-01-23 ENCOUNTER — APPOINTMENT (OUTPATIENT)
Dept: THORACIC SURGERY | Facility: CLINIC | Age: 75
End: 2020-01-23
Payer: MEDICARE

## 2020-01-23 VITALS
SYSTOLIC BLOOD PRESSURE: 136 MMHG | TEMPERATURE: 98.2 F | WEIGHT: 149 LBS | OXYGEN SATURATION: 96 % | HEART RATE: 90 BPM | RESPIRATION RATE: 17 BRPM | BODY MASS INDEX: 24.79 KG/M2 | DIASTOLIC BLOOD PRESSURE: 75 MMHG

## 2020-01-23 PROCEDURE — 99213 OFFICE O/P EST LOW 20 MIN: CPT

## 2020-01-24 NOTE — PHYSICAL EXAM
[Auscultation Breath Sounds / Voice Sounds] : lungs were clear to auscultation bilaterally [Heart Rate And Rhythm] : heart rate was normal and rhythm regular [Heart Sounds] : normal S1 and S2 [Heart Sounds Gallop] : no gallops [Heart Sounds Pericardial Friction Rub] : no pericardial rub [Murmurs] : no murmurs [Examination Of The Chest] : the chest was normal in appearance [Chest Visual Inspection Thoracic Asymmetry] : no chest asymmetry [Bowel Sounds] : normal bowel sounds [Abdomen Soft] : soft [Diminished Respiratory Excursion] : normal chest expansion [Abdomen Tenderness] : non-tender [Abnormal Walk] : normal gait [Abdomen Mass (___ Cm)] : no abdominal mass palpated [Nail Clubbing] : no clubbing  or cyanosis of the fingernails [Musculoskeletal - Swelling] : no joint swelling seen [Motor Tone] : muscle strength and tone were normal [Skin Color & Pigmentation] : normal skin color and pigmentation [] : no rash [Skin Turgor] : normal skin turgor [Deep Tendon Reflexes (DTR)] : deep tendon reflexes were 2+ and symmetric [No Focal Deficits] : no focal deficits [Sensation] : the sensory exam was normal to light touch and pinprick [Oriented To Time, Place, And Person] : oriented to person, place, and time [Impaired Insight] : insight and judgment were intact [Affect] : the affect was normal

## 2020-01-27 NOTE — HISTORY OF PRESENT ILLNESS
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 \par \par Now ~ 2yrs s/p FB, Navigational bronch, FNA of RLL, Rt VATS Robotic-assisted, RLL wedge rxn, RLLobectomy, MLND on 2/28/18. 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 BAL of RLL +AdenoCA. BAL of ALEXANDREA negative for malignancy.\par Post-op complicated w/ bronchospasm, subcutaneous emphysema, pneumonia and A-fib (treated w/ Cardizem).\par  \par CT Chest on 6/20/2018:\par - stable 3mm RUL nodule\par - Small loculated right-sided pleural effusion\par - severe left apical scarring w/ traction bronchiectasis, stable\par \par CT Chest on 1/11/19:\par - post-op changes\par - a stable 3mm RUL nodule (series 3 image 28)\par - a stable 4mm subpleural LLL nodule\par - small loculated Rt basal pleural effusion, stable\par - moderate left apical scarring w/ traction bronchiectasis\par \par CT Chest on 7/12/19:\par - post-op changes\par - stable 3mm nodule in RUL\par - stable 4mm subpleural nodule LLL\par - minimal loculated Rt pleural effusion, decreased\par - a Rt renal calculus 4 mm \par \par CT Chest on 1/17/20:\par - post-op changes\par - stable trace Rt pleural effusion\par - stable moderate ALEXANDREA scarring\par - stable subcentimeter nodules, 4 mm LLL (3:75), 3 mm RUL (3:28), 3 mm LLL (3:92)\par - stable 4 mm Rt renal mid pole calculus\par \par Pt presents today for follow up. Pt admits to SOB on exertion, denies cough or CP.

## 2020-01-27 NOTE — ASSESSMENT
[FreeTextEntry1] : 73 y/o M, never smoker, w/ hx of HLD, DALIA on CPAP at night, impaired fasting glucose (diet control), personal hx of TB at age 18 (treated for 18 months in [place]), and lung cancer. \par \par Now ~ 2yrs s/p FB, Navigational bronch, FNA of RLL, Rt VATS Robotic-assisted, RLL wedge rxn, RLLobectomy, MLND on 2/28/18. 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 BAL of RLL +AdenoCA. BAL of ALEXANDREA negative for malignancy.\par Post-op complicated w/ bronchospasm, subcutaneous emphysema, pneumonia and A-fib (treated w/ Cardizem).\par  \par CT Chest on 1/17/20:\par - post-op changes\par - stable trace Rt pleural effusion\par - stable moderate ALEXANDREA scarring\par - stable subcentimeter nodules, 4 mm LLL (3:75), 3 mm RUL (3:28), 3 mm LLL (3:92)\par - stable 4 mm Rt renal mid pole calculus\par \par I have reviewed the patient's medical records and diagnostic images at time of this office consultation and have made the following recommendation:\par 1. CT reviewed with pt, stable scan. RTC in 6 mons with CT Chest w/o contrast\par \par \par I personally performed the services described in the documentation, reviewed the documentation recorded by the scribe in my presence and it accurately and completely records my words and actions. \par \par I, Dacia Sidhu, NP, am scribing for and the presence of Dr. Aiden Maier the following sections, HISTORY OF PRESENT ILLNESS, PAST MEDICAL/FAMILY/SOCIAL HISTORY; REVIEW OF SYSTEMS; VITAL SIGNS; PHYSICAL EXAM; DISPOSITION.\par

## 2020-01-27 NOTE — CONSULT LETTER
[Dear  ___] : Dear  [unfilled], [( Thank you for referring [unfilled] for consultation for _____ )] : Thank you for referring [unfilled] for consultation for [unfilled] [Consult Letter:] : I had the pleasure of evaluating your patient, [unfilled]. [Please see my note below.] : Please see my note below. [Consult Closing:] : Thank you very much for allowing me to participate in the care of this patient.  If you have any questions, please do not hesitate to contact me. [Sincerely,] : Sincerely, [DrRobert  ___] : Dr. MCKEON [DrRobert ___] : Dr. MCKEON [FreeTextEntry2] : Paul Jackman MD (Hem/Onc/Referring)\par Dionicio Jackman MD (PCP)\par Preet Garcia MD (Pul)  [FreeTextEntry3] : Aiden Maier MD, MPH \par System Director of Thoracic Surgery \par Director of Comprehensive Lung and Foregut [place] \par Professor Cardiovascular & Thoracic Surgery  \par [employer] [facility]\par

## 2020-01-27 NOTE — DATA REVIEWED
[FreeTextEntry1] : CT Chest on 1/17/20:\par - post-op changes\par - stable trace Rt pleural effusion\par - stable moderate ALEXANDREA scarring\par - stable subcentimeter nodules, 4 mm LLL (3:75), 3 mm RUL (3:28), 3 mm LLL (3:92)\par - stable 4 mm Rt renal mid pole calculus

## 2020-03-10 ENCOUNTER — APPOINTMENT (OUTPATIENT)
Dept: UROLOGY | Facility: CLINIC | Age: 75
End: 2020-03-10

## 2020-08-20 ENCOUNTER — APPOINTMENT (OUTPATIENT)
Dept: THORACIC SURGERY | Facility: CLINIC | Age: 75
End: 2020-08-20
Payer: MEDICARE

## 2020-08-20 PROCEDURE — 99442: CPT | Mod: 95

## 2020-08-21 NOTE — HISTORY OF PRESENT ILLNESS
[Home] : at home, [unfilled] , at the time of the visit. [Medical Office: ([facility])___] : at the medical office located in  [Verbal consent obtained from patient] : the patient, [unfilled] [FreeTextEntry1] : 76 y/o M, never smoker, w/ hx of HLD, DALIA on CPAP at night, impaired fasting glucose (diet control), personal hx of TB at age 18 (treated for 18 months in [place]), and lung cancer. \par \par Now 2.5 yr s/p FB, Navigational bronch, FNA of RLL, Rt VATS Robotic-assisted, RLL wedge rxn, RLLobectomy, MLND on 2/28/18. 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 BAL of RLL +AdenoCA. BAL of ALEXANDREA negative for malignancy.\par Post-op complicated w/ bronchospasm, subcutaneous emphysema, pneumonia and A-fib (treated w/ Cardizem).\par  \par CT Chest on 7/12/19:\par - post-op changes\par - stable 3mm nodule in RUL\par - stable 4mm subpleural nodule LLL\par - minimal loculated Rt pleural effusion, decreased\par - a Rt renal calculus 4 mm \par \par CT Chest on 1/17/20:\par - post-op changes\par - stable trace Rt pleural effusion\par - stable moderate ALEXANDREA scarring\par - stable subcentimeter nodules, 4 mm LLL (3:75), 3 mm RUL (3:28), 3 mm LLL (3:92)\par - stable 4 mm Rt renal mid pole calculus\par \par CT Chest on 8/14/2020:\par - a stable 5mm ALEXANDREA subpleural nodule (3:40)\par - a stable 3mm RUL nodule \par - multiple stable subpleural nodules in the LLL: a 4mm (3:80), a 4mm (3:96)\par - JONI \par \par Patient is followed today via Telephonic visit. Admits to SOB on exertion. Denies CP, cough.

## 2020-08-21 NOTE — DATA REVIEWED
[FreeTextEntry1] : CT Chest on 8/14/2020:\par - a stable 5mm ALEXANDREA subpleural nodule (3:40)\par - a stable 3mm RUL nodule \par - multiple stable subpleural nodules in the LLL: a 4mm (3:80), a 4mm (3:96)\par - JONI \par

## 2020-09-24 ENCOUNTER — APPOINTMENT (OUTPATIENT)
Dept: UROLOGY | Facility: CLINIC | Age: 75
End: 2020-09-24

## 2020-09-25 ENCOUNTER — APPOINTMENT (OUTPATIENT)
Dept: UROLOGY | Facility: CLINIC | Age: 75
End: 2020-09-25
Payer: MEDICARE

## 2020-09-25 PROCEDURE — 99214 OFFICE O/P EST MOD 30 MIN: CPT | Mod: 95

## 2020-09-25 NOTE — ADDENDUM
[FreeTextEntry1] : Entered by Barrie Torres, acting as scribe for Dr. Matt Hoyos.\par \par The documentation recorded by the scribe accurately reflects the service I personally performed and the decisions made by me.\par

## 2020-09-27 NOTE — HISTORY OF PRESENT ILLNESS
[Home] : at home, [unfilled] , at the time of the visit. [Medical Office: ([facility])___] : at the medical office located in  [Verbal consent obtained from patient] : the patient, [unfilled] [FreeTextEntry1] : CONFIRMED PHONE#: [phone]

## 2020-09-27 NOTE — LETTER BODY
[FreeTextEntry1] : Reason for visit: BPH. Elevated PSA\par \par This is a 75 year -year-old gentleman with elevated PSA and chronic BPH. Patient returns today via telehealth for followup. Patient continues to take Proscar. Patient reports taking the medications regularly without any side effects or difficulties with the medication. Patient reports stable urinary flow. Patient denies any urinary retention or hematuria or changes in health. Patient has no pain. The past medical history and family history and social history are unchanged. All other review of systems are negative. Patient denies any changes in medications. Medication list was reconciled.\par \par On video examination, patient appears well. \par \par Assessment: BPH, symptoms improved with Proscar. Elevated PSA\par \par I counseled the patient.  I renewed his prescription for Proscar today. I encouraged him to continue medication. Patient will repeat PSA today. Risks and alternatives were discussed. I answered the patient questions. The patient will follow-up as directed and will contact me with any questions or concerns. \par \par Plan: Followup 3 months.\par \par I spent over half of the 25-minute encounter counseling the patient and coordinating his care.

## 2020-09-27 NOTE — HISTORY OF PRESENT ILLNESS
[Home] : at home, [unfilled] , at the time of the visit. [Medical Office: ([facility])___] : at the medical office located in  [Verbal consent obtained from patient] : the patient, [unfilled] [FreeTextEntry1] : CONFIRMED PHONE#: [phone]

## 2020-10-19 NOTE — PATIENT PROFILE ADULT. - SUPPORT PERSON NAME
From: Paola Lucas  To: Chance Jenkins  Sent: 10/17/2020 8:52 AM CDT  Subject: Other    I didn’t see the message to come in for the urine test until too late. However, I did hydrate very well yesterday and still have not had any burning or uti symptoms. I am still getting a fever that jumps around 100.1 -then dips - then jumps again. My head feels foggy, and i am congested. I have a slight cough. I am lethargic and fatigued. I went through the process to get a Covid test with the call center and its is scheduled for 5:30 Monday at [facility]     Is there anywhere or anyway to obtain a test sooner?   Thank you    Paola holt   Silvia Burgos

## 2020-12-17 NOTE — ED PROVIDER NOTE - PSH
Negative
Foot fracture, left  ' 90's:  ORIF  H/O left inguinal hernia repair    History of prostate surgery  greenlight laser 2013  Status post cataract extraction  bilateral

## 2020-12-21 PROBLEM — N39.0 ACUTE UTI: Status: RESOLVED | Noted: 2019-08-28 | Resolved: 2020-12-21

## 2021-02-25 ENCOUNTER — APPOINTMENT (OUTPATIENT)
Dept: THORACIC SURGERY | Facility: CLINIC | Age: 76
End: 2021-02-25
Payer: MEDICARE

## 2021-02-25 PROCEDURE — 99443: CPT | Mod: 95

## 2021-02-27 NOTE — REASON FOR VISIT
[Home] : at home, [unfilled] , at the time of the visit. [Medical Office: ([facility])___] : at the medical office located in  [Verbal consent obtained from patient] : the patient, [unfilled] [Follow-Up: _____] : a [unfilled] follow-up visit

## 2021-03-02 NOTE — CONSULT LETTER
[Dear  ___] : Dear  [unfilled], [Consult Letter:] : I had the pleasure of evaluating your patient, [unfilled]. [( Thank you for referring [unfilled] for consultation for _____ )] : Thank you for referring [unfilled] for consultation for [unfilled] [Please see my note below.] : Please see my note below. [Consult Closing:] : Thank you very much for allowing me to participate in the care of this patient.  If you have any questions, please do not hesitate to contact me. [Sincerely,] : Sincerely, [DrRobert  ___] : Dr. MCKEON [DrRobert ___] : Dr. MCKEON [FreeTextEntry2] : Paul Jackman MD (Hem/Onc/Referring)\par Dionicio Jackman MD (PCP)\par Preet Garcia MD (Pul)  [FreeTextEntry3] : Aiden Maier MD, MPH \par System Director of Thoracic Surgery \par Director of Comprehensive Lung and Foregut [place] \par Professor Cardiovascular & Thoracic Surgery  \par [employer] at [facility]\par

## 2021-03-02 NOTE — ASSESSMENT
[FreeTextEntry1] : 76 y/o M, never smoker, w/ hx of HLD, DALIA on CPAP at night, impaired fasting glucose (diet control), personal hx of TB at age 18 (treated for 18 months in [place]), and lung cancer. \par \par Now 3 yr s/p FB, Navigational bronch, FNA of RLL, Rt VATS Robotic-assisted, RLL wedge rxn, RLLobectomy, MLND on 2/28/18. 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 BAL of RLL +AdenoCA. BAL of ALEXANDREA negative for malignancy.\par Post-op complicated w/ bronchospasm, subcutaneous emphysema, pneumonia and A-fib (treated w/ Cardizem).\par \par CT chest on 2/20/2021:\par - postop changes to the left lung with patchy scarring, focal bronchiectasis medial ALEXANDREA, JONI. \par -stable 5 mm ALEXANDREA GGO (3:35), 3 mm RUL nodule (3:26), 4 mm LLL nodule (3:74). \par - mild cardiomegaly. \par \par \par I have reviewed the patient's medical records and diagnostic images at time of this office consultation and have made the following recommendation:\par 1. No evidence of recurrent disease. \par 2. RTC in 1 year with CT chest. \par \par \par \par I have spent 25 minutes on the phone discussing above result and plan of care with patient.\par \par I personally performed the services described in the documentation, reviewed the documentation recorded by the scribe in my presence and it accurately and completely records my words and actions. \par \par I, Ana Tristan, NP, am scribing for and the presence of Dr. Aiden Maier the following sections, HISTORY OF PRESENT ILLNESS, PAST MEDICAL/FAMILY/SOCIAL HISTORY; REVIEW OF SYSTEMS; VITAL SIGNS; PHYSICAL EXAM; DISPOSITION.\par

## 2021-03-02 NOTE — DATA REVIEWED
[FreeTextEntry1] : CT chest on 2/20/2021:\par - postop changes to the left lung with patchy scarring, focal bronchiectasis medial ALEXANDREA, JONI. \par -stable 5 mm ALEXANDREA GGO (3:35), 3 mm RUL nodule (3:26), 4 mm LLL nodule (3:74). \par - mild cardiomegaly.

## 2021-03-02 NOTE — HISTORY OF PRESENT ILLNESS
[FreeTextEntry1] : 74 y/o M, never smoker, w/ hx of HLD, DALIA on CPAP at night, impaired fasting glucose (diet control), personal hx of TB at age 18 (treated for 18 months in [place]), and lung cancer. \par \par Now 3 yr s/p FB, Navigational bronch, FNA of RLL, Rt VATS Robotic-assisted, RLL wedge rxn, RLLobectomy, MLND on 2/28/18. 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 BAL of RLL +AdenoCA. BAL of ALEXANDREA negative for malignancy.\par Post-op complicated w/ bronchospasm, subcutaneous emphysema, pneumonia and A-fib (treated w/ Cardizem).\par  \par CT Chest on 7/12/19:\par - post-op changes\par - stable 3mm nodule in RUL\par - stable 4mm subpleural nodule LLL\par - minimal loculated Rt pleural effusion, decreased\par - a Rt renal calculus 4 mm \par \par CT Chest on 1/17/20:\par - post-op changes\par - stable trace Rt pleural effusion\par - stable moderate ALEXANDREA scarring\par - stable subcentimeter nodules, 4 mm LLL (3:75), 3 mm RUL (3:28), 3 mm LLL (3:92)\par - stable 4 mm Rt renal mid pole calculus\par \par CT Chest on 8/14/2020:\par - a stable 5mm ALEXANDREA subpleural nodule (3:40)\par - a stable 3mm RUL nodule \par - multiple stable subpleural nodules in the LLL: a 4mm (3:80), a 4mm (3:96)\par - JONI \par \par CT chest on 2/20/2021:\par - postop changes to the left lung with patchy scarring, focal bronchiectasis medial ALEXANDREA, JONI. \par -stable 5 mm ALEXANDREA GGO (3:35), 3 mm RUL nodule (3:26), 4 mm LLL nodule (3:74). \par - mild cardiomegaly. \par \par Patient is followed today via Telephonic visit. Admits to SOB on exertion. Denies CP, cough.

## 2021-06-18 ENCOUNTER — APPOINTMENT (OUTPATIENT)
Dept: UROLOGY | Facility: CLINIC | Age: 76
End: 2021-06-18

## 2021-07-02 ENCOUNTER — INPATIENT (INPATIENT)
Facility: HOSPITAL | Age: 76
LOS: 3 days | Discharge: HOME | DRG: 872 | End: 2021-07-06
Attending: HOSPITALIST | Admitting: HOSPITALIST
Payer: MEDICARE

## 2021-07-02 VITALS
OXYGEN SATURATION: 96 % | RESPIRATION RATE: 20 BRPM | TEMPERATURE: 98 F | SYSTOLIC BLOOD PRESSURE: 132 MMHG | HEIGHT: 66 IN | DIASTOLIC BLOOD PRESSURE: 76 MMHG | WEIGHT: 160.06 LBS | HEART RATE: 120 BPM

## 2021-07-02 DIAGNOSIS — Z98.49 CATARACT EXTRACTION STATUS, UNSPECIFIED EYE: Chronic | ICD-10-CM

## 2021-07-02 DIAGNOSIS — Z98.890 OTHER SPECIFIED POSTPROCEDURAL STATES: Chronic | ICD-10-CM

## 2021-07-02 DIAGNOSIS — R50.9 FEVER, UNSPECIFIED: ICD-10-CM

## 2021-07-02 LAB
ALBUMIN SERPL ELPH-MCNC: 3.4 G/DL — SIGNIFICANT CHANGE UP (ref 3.3–5)
ALP SERPL-CCNC: 62 U/L — SIGNIFICANT CHANGE UP (ref 30–120)
ALT FLD-CCNC: 46 U/L DA — SIGNIFICANT CHANGE UP (ref 10–60)
ANION GAP SERPL CALC-SCNC: 10 MMOL/L — SIGNIFICANT CHANGE UP (ref 5–17)
APPEARANCE UR: CLEAR — SIGNIFICANT CHANGE UP
APTT BLD: 28.5 SEC — SIGNIFICANT CHANGE UP (ref 27.5–35.5)
AST SERPL-CCNC: 21 U/L — SIGNIFICANT CHANGE UP (ref 10–40)
BACTERIA # UR AUTO: ABNORMAL
BASOPHILS # BLD AUTO: 0.03 K/UL — SIGNIFICANT CHANGE UP (ref 0–0.2)
BASOPHILS NFR BLD AUTO: 0.2 % — SIGNIFICANT CHANGE UP (ref 0–2)
BILIRUB SERPL-MCNC: 0.9 MG/DL — SIGNIFICANT CHANGE UP (ref 0.2–1.2)
BILIRUB UR-MCNC: NEGATIVE — SIGNIFICANT CHANGE UP
BUN SERPL-MCNC: 16 MG/DL — SIGNIFICANT CHANGE UP (ref 7–23)
CALCIUM SERPL-MCNC: 9.3 MG/DL — SIGNIFICANT CHANGE UP (ref 8.4–10.5)
CHLORIDE SERPL-SCNC: 103 MMOL/L — SIGNIFICANT CHANGE UP (ref 96–108)
CO2 SERPL-SCNC: 24 MMOL/L — SIGNIFICANT CHANGE UP (ref 22–31)
COLOR SPEC: YELLOW — SIGNIFICANT CHANGE UP
CREAT SERPL-MCNC: 0.97 MG/DL — SIGNIFICANT CHANGE UP (ref 0.5–1.3)
DIFF PNL FLD: ABNORMAL
EOSINOPHIL # BLD AUTO: 0.03 K/UL — SIGNIFICANT CHANGE UP (ref 0–0.5)
EOSINOPHIL NFR BLD AUTO: 0.2 % — SIGNIFICANT CHANGE UP (ref 0–6)
EPI CELLS # UR: NEGATIVE — SIGNIFICANT CHANGE UP
GLUCOSE SERPL-MCNC: 152 MG/DL — HIGH (ref 70–99)
GLUCOSE UR QL: NEGATIVE MG/DL — SIGNIFICANT CHANGE UP
HCT VFR BLD CALC: 44.3 % — SIGNIFICANT CHANGE UP (ref 39–50)
HGB BLD-MCNC: 14.4 G/DL — SIGNIFICANT CHANGE UP (ref 13–17)
IMM GRANULOCYTES NFR BLD AUTO: 0.5 % — SIGNIFICANT CHANGE UP (ref 0–1.5)
INR BLD: 1.09 RATIO — SIGNIFICANT CHANGE UP (ref 0.88–1.16)
KETONES UR-MCNC: NEGATIVE — SIGNIFICANT CHANGE UP
LACTATE SERPL-SCNC: 1.3 MMOL/L — SIGNIFICANT CHANGE UP (ref 0.7–2)
LACTATE SERPL-SCNC: 2.6 MMOL/L — HIGH (ref 0.7–2)
LEUKOCYTE ESTERASE UR-ACNC: NEGATIVE — SIGNIFICANT CHANGE UP
LYMPHOCYTES # BLD AUTO: 1.85 K/UL — SIGNIFICANT CHANGE UP (ref 1–3.3)
LYMPHOCYTES # BLD AUTO: 11.3 % — LOW (ref 13–44)
MCHC RBC-ENTMCNC: 29 PG — SIGNIFICANT CHANGE UP (ref 27–34)
MCHC RBC-ENTMCNC: 32.5 GM/DL — SIGNIFICANT CHANGE UP (ref 32–36)
MCV RBC AUTO: 89.3 FL — SIGNIFICANT CHANGE UP (ref 80–100)
MONOCYTES # BLD AUTO: 1.2 K/UL — HIGH (ref 0–0.9)
MONOCYTES NFR BLD AUTO: 7.3 % — SIGNIFICANT CHANGE UP (ref 2–14)
NEUTROPHILS # BLD AUTO: 13.2 K/UL — HIGH (ref 1.8–7.4)
NEUTROPHILS NFR BLD AUTO: 80.5 % — HIGH (ref 43–77)
NITRITE UR-MCNC: NEGATIVE — SIGNIFICANT CHANGE UP
NRBC # BLD: 0 /100 WBCS — SIGNIFICANT CHANGE UP (ref 0–0)
PH UR: 5 — SIGNIFICANT CHANGE UP (ref 5–8)
PLATELET # BLD AUTO: 161 K/UL — SIGNIFICANT CHANGE UP (ref 150–400)
POTASSIUM SERPL-MCNC: 3.5 MMOL/L — SIGNIFICANT CHANGE UP (ref 3.5–5.3)
POTASSIUM SERPL-SCNC: 3.5 MMOL/L — SIGNIFICANT CHANGE UP (ref 3.5–5.3)
PROT SERPL-MCNC: 8.2 G/DL — SIGNIFICANT CHANGE UP (ref 6–8.3)
PROT UR-MCNC: 100 MG/DL
PROTHROM AB SERPL-ACNC: 13.1 SEC — SIGNIFICANT CHANGE UP (ref 10.6–13.6)
RAPID RVP RESULT: SIGNIFICANT CHANGE UP
RBC # BLD: 4.96 M/UL — SIGNIFICANT CHANGE UP (ref 4.2–5.8)
RBC # FLD: 14.4 % — SIGNIFICANT CHANGE UP (ref 10.3–14.5)
RBC CASTS # UR COMP ASSIST: ABNORMAL /HPF (ref 0–4)
SARS-COV-2 RNA SPEC QL NAA+PROBE: SIGNIFICANT CHANGE UP
SODIUM SERPL-SCNC: 137 MMOL/L — SIGNIFICANT CHANGE UP (ref 135–145)
SP GR SPEC: 1.02 — SIGNIFICANT CHANGE UP (ref 1.01–1.02)
UROBILINOGEN FLD QL: NEGATIVE MG/DL — SIGNIFICANT CHANGE UP
WBC # BLD: 16.39 K/UL — HIGH (ref 3.8–10.5)
WBC # FLD AUTO: 16.39 K/UL — HIGH (ref 3.8–10.5)
WBC UR QL: SIGNIFICANT CHANGE UP

## 2021-07-02 PROCEDURE — 71250 CT THORAX DX C-: CPT | Mod: 26

## 2021-07-02 PROCEDURE — 71045 X-RAY EXAM CHEST 1 VIEW: CPT | Mod: 26

## 2021-07-02 PROCEDURE — 99285 EMERGENCY DEPT VISIT HI MDM: CPT | Mod: CS

## 2021-07-02 PROCEDURE — 74176 CT ABD & PELVIS W/O CONTRAST: CPT | Mod: 26

## 2021-07-02 PROCEDURE — 93010 ELECTROCARDIOGRAM REPORT: CPT

## 2021-07-02 PROCEDURE — 99223 1ST HOSP IP/OBS HIGH 75: CPT | Mod: AI

## 2021-07-02 RX ORDER — ENOXAPARIN SODIUM 100 MG/ML
40 INJECTION SUBCUTANEOUS DAILY
Refills: 0 | Status: DISCONTINUED | OUTPATIENT
Start: 2021-07-02 | End: 2021-07-06

## 2021-07-02 RX ORDER — SODIUM CHLORIDE 9 MG/ML
2200 INJECTION INTRAMUSCULAR; INTRAVENOUS; SUBCUTANEOUS ONCE
Refills: 0 | Status: COMPLETED | OUTPATIENT
Start: 2021-07-02 | End: 2021-07-02

## 2021-07-02 RX ORDER — PIPERACILLIN AND TAZOBACTAM 4; .5 G/20ML; G/20ML
3.38 INJECTION, POWDER, LYOPHILIZED, FOR SOLUTION INTRAVENOUS EVERY 8 HOURS
Refills: 0 | Status: DISCONTINUED | OUTPATIENT
Start: 2021-07-02 | End: 2021-07-06

## 2021-07-02 RX ORDER — BUDESONIDE AND FORMOTEROL FUMARATE DIHYDRATE 160; 4.5 UG/1; UG/1
2 AEROSOL RESPIRATORY (INHALATION)
Refills: 0 | Status: DISCONTINUED | OUTPATIENT
Start: 2021-07-02 | End: 2021-07-06

## 2021-07-02 RX ORDER — MONTELUKAST 4 MG/1
10 TABLET, CHEWABLE ORAL AT BEDTIME
Refills: 0 | Status: DISCONTINUED | OUTPATIENT
Start: 2021-07-02 | End: 2021-07-06

## 2021-07-02 RX ORDER — VANCOMYCIN HCL 1 G
1000 VIAL (EA) INTRAVENOUS ONCE
Refills: 0 | Status: COMPLETED | OUTPATIENT
Start: 2021-07-02 | End: 2021-07-02

## 2021-07-02 RX ORDER — SODIUM CHLORIDE 9 MG/ML
1000 INJECTION, SOLUTION INTRAVENOUS
Refills: 0 | Status: DISCONTINUED | OUTPATIENT
Start: 2021-07-02 | End: 2021-07-06

## 2021-07-02 RX ORDER — PIPERACILLIN AND TAZOBACTAM 4; .5 G/20ML; G/20ML
3.38 INJECTION, POWDER, LYOPHILIZED, FOR SOLUTION INTRAVENOUS ONCE
Refills: 0 | Status: COMPLETED | OUTPATIENT
Start: 2021-07-02 | End: 2021-07-02

## 2021-07-02 RX ORDER — DILTIAZEM HCL 120 MG
120 CAPSULE, EXT RELEASE 24 HR ORAL DAILY
Refills: 0 | Status: DISCONTINUED | OUTPATIENT
Start: 2021-07-02 | End: 2021-07-02

## 2021-07-02 RX ORDER — ACETAMINOPHEN 500 MG
650 TABLET ORAL ONCE
Refills: 0 | Status: COMPLETED | OUTPATIENT
Start: 2021-07-02 | End: 2021-07-02

## 2021-07-02 RX ORDER — ATORVASTATIN CALCIUM 80 MG/1
10 TABLET, FILM COATED ORAL AT BEDTIME
Refills: 0 | Status: DISCONTINUED | OUTPATIENT
Start: 2021-07-02 | End: 2021-07-06

## 2021-07-02 RX ORDER — ATORVASTATIN CALCIUM 80 MG/1
10 TABLET, FILM COATED ORAL AT BEDTIME
Refills: 0 | Status: DISCONTINUED | OUTPATIENT
Start: 2021-07-02 | End: 2021-07-02

## 2021-07-02 RX ORDER — DILTIAZEM HCL 120 MG
120 CAPSULE, EXT RELEASE 24 HR ORAL AT BEDTIME
Refills: 0 | Status: DISCONTINUED | OUTPATIENT
Start: 2021-07-02 | End: 2021-07-06

## 2021-07-02 RX ADMIN — PIPERACILLIN AND TAZOBACTAM 200 GRAM(S): 4; .5 INJECTION, POWDER, LYOPHILIZED, FOR SOLUTION INTRAVENOUS at 20:45

## 2021-07-02 RX ADMIN — SODIUM CHLORIDE 2200 MILLILITER(S): 9 INJECTION INTRAMUSCULAR; INTRAVENOUS; SUBCUTANEOUS at 21:08

## 2021-07-02 RX ADMIN — Medication 650 MILLIGRAM(S): at 23:29

## 2021-07-02 RX ADMIN — SODIUM CHLORIDE 2200 MILLILITER(S): 9 INJECTION INTRAMUSCULAR; INTRAVENOUS; SUBCUTANEOUS at 22:09

## 2021-07-02 RX ADMIN — Medication 250 MILLIGRAM(S): at 21:10

## 2021-07-02 RX ADMIN — Medication 650 MILLIGRAM(S): at 21:08

## 2021-07-02 NOTE — H&P ADULT - NSICDXPASTMEDICALHX_GEN_ALL_CORE_FT
PAST MEDICAL HISTORY:  Asthma mild    BPH (Benign Prostatic Hyperplasia)     Elevated triglycerides with high cholesterol     Fatty liver     Foot fracture, left " 90's    Impaired glucose tolerance on diet control    DALIA on CPAP setting of 4    Other nonspecific abnormal finding of lung field     Renal calculi ' 2005:  passed    TB (tuberculosis) age 20:  Treated with meds X 1year

## 2021-07-02 NOTE — H&P ADULT - NSHPREVIEWOFSYSTEMS_GEN_ALL_CORE
REVIEW OF SYSTEMS:  CONSTITUTIONAL:    +chills, +weight loss, +fatigue  EYES:    no eye pain or visual disturbances or discharge  ENMT:     no difficulty hearing or tinnitus or vertigo, no sinus or throat pain  NECK:    no pain or stiffness  RESPIRATORY:    no cough or wheezing or hemoptysis, no shortness of breath  CARDIOVASCULAR:    no chest pain or palpitations or dizziness or leg swelling  GASTROINTESTINAL:    +nausea, no abdominal or epigastric pain. no vomiting or hematemesis, no diarrhea or constipation. no melena or hematochezia.  GENITOURINARY:    no dysuria or frequency or hematuria or incontinence  NEUROLOGICAL:    no headaches or memory loss or loss of strength or numbness or tremors  SKIN:    no itching or burning or rashes or lesions   LYMPH NODES:    no enlarged glands  ENDOCRINE:    no heat or cold intolerance, no hair loss, no polydipsia or polyuria  MUSCULOSKELETAL:    no joint pain or swelling, no muscle or back or extremity pain  PSYCHIATRIC:    no depression or anxiety or mood swings or difficulty sleeping  HEME/LYMPH:    no easy bruising or bleeding gums  ALLERGY AND IMMUNOLOGIC:    no hives or eczema

## 2021-07-02 NOTE — H&P ADULT - NSHPPHYSICALEXAM_GEN_ALL_CORE
PHYSICAL EXAM:  Vital Signs Last 24 Hrs  T(C): 38.3 (02 Jul 2021 21:55), Max: 40.2 (02 Jul 2021 19:47)  T(F): 100.9 (02 Jul 2021 21:55), Max: 104.3 (02 Jul 2021 19:47)  HR: 98 (02 Jul 2021 22:54) (98 - 120)  BP: 109/55 (02 Jul 2021 22:54) (109/55 - 132/76)  BP(mean): --  RR: 18 (02 Jul 2021 22:54) (18 - 20)  SpO2: 96% (02 Jul 2021 22:54) (96% - 96%)    GENERAL:     well-appearing male in NAD  HEAD:     atraumatic  EYES:     EOMI, conjunctiva and sclera clear  RESPIRATORY:     clear to auscultation bilaterally, no rales or rhonchi or wheezing or rubs  CARDIOVASCULAR:     s1 and occasional split s2, regular rate and rhythm, no murmurs or rubs or gallops, 2+ peripheral pulses  GASTROINTESTINAL:     soft, nontender, nondistended, no hepatosplenomegaly palpated, bowel sounds present  EXTREMITIES:     no clubbing or cyanosis or edema  MUSCULOSKELETAL:     no joint pain or swelling or deformities  NERVOUS SYSTEM:     motor strength intact with 5/5 B/L upper and lower extremities, no gross sensory deficits  SKIN:     no rashes or lesions  PSYCH:     appropriate, alert and orientated x3, good concentration

## 2021-07-02 NOTE — H&P ADULT - NSICDXPASTSURGICALHX_GEN_ALL_CORE_FT
PAST SURGICAL HISTORY:  Foot fracture, left ' 90's:  ORIF    H/O left inguinal hernia repair     History of prostate surgery greenlight laser 2013    Status post cataract extraction bilateral

## 2021-07-02 NOTE — ED ADULT TRIAGE NOTE - HEIGHT IN CM
Call placed for expedited appeal. After multiple calls was able to be connected to a representative. Information given and appeal was submitted. Appeal reference number [id number]. Call back number provided. I will receive a call within 72 hours if more information or a formal appeal is needed.     DAVIDSON Almonte   [phone]

## 2021-07-02 NOTE — ED PROVIDER NOTE - RESPIRATORY, MLM
Breath sounds clear and equal bilaterally.  No respiratory distress, speaking in full sentences, no accessory muscle use

## 2021-07-02 NOTE — H&P ADULT - NSHPLABSRESULTS_GEN_ALL_CORE
LABS:                        14.4   16.39<H> )-----------( 161      ( 2021 20:23 )             44.3     137    |  103    |  16     ----------------------------<  152<H>    2021 20:23  3.5     |  24     |  0.97         Ca 9.3           2021 20:23        TPro  8.2    /  Alb  3.4    /  TBili  0.9    /  DBili  x      /  AST  21     /  ALT  46     /  AlkPhos  62     2021 20:23    PT/INR - ( 2021 20:23 )   PT: 13.1 ;   INR: 1.09          PTT - ( 2021 20:23 )  PTT:28.5     Urinalysis Basic - ( 2021 20:57 )    Color: Yellow / Appearance: Clear / S.020 / pH: x  Gluc: x / Ketone: Negative  / Bili: Negative / Urobili: Negative mg/dL   Blood: x / Protein: 100 mg/dL / Nitrite: Negative   Leuk Esterase: Negative / RBC: 3-5 /HPF / WBC 3-5   Sq Epi: x / Non Sq Epi: Negative / Bacteria: Occasional    Troponin trend:    Lactate Trend  - @ 21:49 Lactate:1.3   - @ 20:49 Lactate:2.6     EKG (interpreted by me) - sinus tachycardia but no acute abnormalities  Radiology:  CXR (interpreted by me) - no acute infiltrates noted, +implanted cardiac monitor present  CT C/A/P - pending

## 2021-07-02 NOTE — ED PROVIDER NOTE - CLINICAL SUMMARY MEDICAL DECISION MAKING FREE TEXT BOX
Medications refilled per request with 3 refills.   76 year old male p/w SOB, chills, and weakness x 1 day.  Noted to be febrile in ED.  Sepsis work up, cultures, abx, CXR, UA, admit

## 2021-07-02 NOTE — ED PROVIDER NOTE - PMH
Asthma  mild  BPH (Benign Prostatic Hyperplasia)    Elevated triglycerides with high cholesterol    Fatty liver    Foot fracture, left  " 90's  Impaired glucose tolerance  on diet control  Malignant neoplasm of lower lobe of right lung    DALIA on CPAP  setting of 4  Other nonspecific abnormal finding of lung field    Renal calculi  ' 2005:  passed  TB (tuberculosis)  age 20:  Treated with meds X 1year

## 2021-07-02 NOTE — H&P ADULT - ASSESSMENT
Student 76M with hx of lung CA s/p RLL resection in 2018, atrial flutter s/p loop recorder on diltiazem but no blood thinners, BPH, DALIA on CPAP, fatty liver/cirrhosis, recent H.pylori infection (treated for 2 weeks, finished a week ago), kidney and gallstones who presents with chills and fevers.      Problems:  Sepsis from unknown source   2.  Atrial flutter  3.  DALIA on CPAP  4.  Lung CA    Sepsis from unknown source - met sepsis criteria with fevers + tachycardia + leukocytosis + suspected source of infection (though unidentified at the moment)  - admit to medicine  - f/u CT   76M with hx of lung CA s/p RLL resection in 2018, atrial flutter s/p loop recorder on diltiazem but no blood thinners, BPH, DALIA on CPAP, fatty liver/cirrhosis, recent H.pylori infection (treated for 2 weeks, finished a week ago), kidney and gallstones who presents with chills and fevers.      Problems:  1.  Sepsis from unknown source   2.  Atrial flutter  3.  DALIA on CPAP  4.  Lung CA    Sepsis from unknown source - met sepsis criteria with fevers + tachycardia + leukocytosis + suspected source of infection (though unidentified at the moment)  - admit to medicine  - f/u CT C/A/P to evaluate for a source  - for now, will continue zosyn 3.375gms q8h empirically  - dc vancomycin since no MRSA risk  - f/u blood and urine cultures  - check procalcitonin    Atrial flutter - currently in sinus, not on any anticoagulation  - has a [id number] score of 2 (based on age and sex) -> technically meets AC criteria but is not on any  - defer starting AC for now  - continue with diltiazem CD 120mg daily  - remote telemetry    DALIA on CPAP  - will be on remote telemetry  - continue with CPAP with 4 cmH20    Lung CA  - continue with advair or equivalent    Fatty liver   - continue with atorvastatin 10mg qHS    Preventive measures  - start with lovenox 40mg subq for DVT ppx   76M with hx of lung CA s/p RLL resection in 2018, atrial flutter s/p loop recorder on diltiazem but no blood thinners, BPH, DALIA on CPAP, fatty liver/cirrhosis, recent H.pylori infection (treated for 2 weeks, finished a week ago), kidney and gallstones who presents with chills and fevers.      Problems:  1.  Sepsis from unknown source   2.  Atrial flutter  3.  DALIA on CPAP  4.  Lung CA  5.  HLD  6.  BPH    Sepsis from unknown source - met sepsis criteria with fevers + tachycardia + leukocytosis + suspected source of infection (though unidentified at the moment)  - admit to medicine  - f/u CT C/A/P to evaluate for a source  - for now, will continue zosyn 3.375gms q8h empirically  - dc vancomycin since no MRSA risk  - f/u blood and urine cultures  - check procalcitonin    Atrial flutter - currently in sinus, not on any anticoagulation  - has a ESH3EB2-BEAf score of 2 (based on age and sex) -> technically meets AC criteria but is not on any  - defer starting AC for now  - continue with diltiazem CD 120mg daily  - remote telemetry    DALIA on CPAP  - will be on remote telemetry  - continue with CPAP with 4 cmH20    Lung CA  - conitnue with singular  - continue with breo (patient to take his own)    HLD   - continue with atorvastatin 10mg qHS    BPH  - continue home BPH med    Preventive measures  - start with lovenox 40mg subq for DVT ppx

## 2021-07-02 NOTE — ED PROVIDER NOTE - OBJECTIVE STATEMENT
76 year old male with a history of TB, asthma, DALIA, HLD, asthma presents with SOB, weakness, and chills.  Son and daughter-in-law who live with patient state that patient c/o fatigue and generalized weakness last night.  He seemed better today and was able to go to a routine dental visit in the afternoon.  After eating dinner tonight, patient developed chills and rigors.  When EMS arrived, patient had an O2 sat of 89% on RA.  Family states patient is on CPAP at night, no supplemental oxygen during the day.  He recently completed a 2-week antibiotic course of H. pylori.  No sick contacts.  Patient denies n/v/d/chest pain, headache.  He had a right lower lobe lobectomy for lung CA in 2018.  PMD Josr Jackman

## 2021-07-02 NOTE — H&P ADULT - HISTORY OF PRESENT ILLNESS
76M with hx of lung CA s/p RLL resection in 2018, atrial flutter s/p loop recorder on diltiazem,  76M with hx of lung CA s/p RLL resection in 2018, atrial flutter s/p loop recorder on diltiazem but no blood thinners, BPH, DALIA on CPAP, fatty liver/cirrhosis, recent H.pylori infection (treated for 2 weeks, finished a week ago), kidney and gallstones who presents with chills.  Patient's symptoms started yesterday with feeling tired and fatigued.  Today, the symptoms worsened and then during dinner time, patient started to shiver with chills.  Family members then called 911.  Associated with nausea but no vomiting.  No documented fevers.  No pain anywhere.  No SOB, abdominal pain, diarrhea, cough, dysuria, diaphoresis.  No recent travel or known sick contacts.  Finished COVID vaccination series in 3/2021.  Has been losing weight about 8lbs in 1-2 months (says it might be diet related 2/2 reducing carb intake after being told his sugars were "high normal.").     In the ED, patient's triage vitals were /76    RR 20, 96%  T98.2F (eventually climbed to 104.3F).  Patient's labs showed leukocytosis of 16.4 with a left shift and a lactate of 2.6 (went down to 1.3).  Patient was empirically started on vancomycin and zosyn and given 2.2L NS bolus.  Currently the patient feels improved and has no acute complaints at the moment.   76M with hx of lung CA s/p RLL resection in 2018, atrial flutter s/p loop recorder on diltiazem but no blood thinners, BPH, DALIA on CPAP, fatty liver/cirrhosis, HLD,. recent H.pylori infection (treated for 2 weeks, finished a week ago), kidney and gallstones who presents with chills.  Patient's symptoms started yesterday with feeling tired and fatigued.  Today, the symptoms worsened and then during dinner time, patient started to shiver with chills.  Family members then called 911.  Associated with nausea but no vomiting.  No documented fevers.  No pain anywhere.  No SOB, abdominal pain, diarrhea, cough, dysuria, diaphoresis.  No recent travel or known sick contacts.  Finished COVID vaccination series in 3/2021.  Has been losing weight about 8lbs in 1-2 months (says it might be diet related 2/2 reducing carb intake after being told his sugars were "high normal.").     In the ED, patient's triage vitals were /76    RR 20, 96%  T98.2F (eventually climbed to 104.3F).  Patient's labs showed leukocytosis of 16.4 with a left shift and a lactate of 2.6 (went down to 1.3).  Patient was empirically started on vancomycin and zosyn and given 2.2L NS bolus.  Currently the patient feels improved and has no acute complaints at the moment.

## 2021-07-02 NOTE — ED ADULT NURSE NOTE - PMH
Asthma  mild  BPH (Benign Prostatic Hyperplasia)    Elevated triglycerides with high cholesterol    Fatty liver    Foot fracture, left  " 90's  Impaired glucose tolerance  on diet control  DALIA on CPAP  setting of 4  Other nonspecific abnormal finding of lung field    Renal calculi  ' 2005:  passed  TB (tuberculosis)  age 20:  Treated with meds X 1year

## 2021-07-02 NOTE — ED ADULT TRIAGE NOTE - HEART RATE (BEATS/MIN)
[address] Counseling: I discussed with the patient the risks of ixekizumab including but not limited to immunosuppression, serious infections, worsening of inflammatory bowel disease and drug reactions.  The patient understands that monitoring is required including a PPD at baseline and must alert us or the primary physician if symptoms of infection or other concerning signs are noted.

## 2021-07-03 LAB
ALBUMIN SERPL ELPH-MCNC: 2.7 G/DL — LOW (ref 3.3–5)
ALP SERPL-CCNC: 47 U/L — SIGNIFICANT CHANGE UP (ref 30–120)
ALT FLD-CCNC: 33 U/L DA — SIGNIFICANT CHANGE UP (ref 10–60)
ANION GAP SERPL CALC-SCNC: 6 MMOL/L — SIGNIFICANT CHANGE UP (ref 5–17)
AST SERPL-CCNC: 22 U/L — SIGNIFICANT CHANGE UP (ref 10–40)
BASOPHILS # BLD AUTO: 0.02 K/UL — SIGNIFICANT CHANGE UP (ref 0–0.2)
BASOPHILS NFR BLD AUTO: 0.2 % — SIGNIFICANT CHANGE UP (ref 0–2)
BILIRUB SERPL-MCNC: 1 MG/DL — SIGNIFICANT CHANGE UP (ref 0.2–1.2)
BUN SERPL-MCNC: 14 MG/DL — SIGNIFICANT CHANGE UP (ref 7–23)
CALCIUM SERPL-MCNC: 8 MG/DL — LOW (ref 8.4–10.5)
CHLORIDE SERPL-SCNC: 111 MMOL/L — HIGH (ref 96–108)
CO2 SERPL-SCNC: 25 MMOL/L — SIGNIFICANT CHANGE UP (ref 22–31)
CREAT SERPL-MCNC: 0.7 MG/DL — SIGNIFICANT CHANGE UP (ref 0.5–1.3)
EOSINOPHIL # BLD AUTO: 0.08 K/UL — SIGNIFICANT CHANGE UP (ref 0–0.5)
EOSINOPHIL NFR BLD AUTO: 0.7 % — SIGNIFICANT CHANGE UP (ref 0–6)
GLUCOSE SERPL-MCNC: 105 MG/DL — HIGH (ref 70–99)
HCT VFR BLD CALC: 39.9 % — SIGNIFICANT CHANGE UP (ref 39–50)
HCV AB S/CO SERPL IA: 0.49 S/CO — SIGNIFICANT CHANGE UP (ref 0–0.99)
HCV AB SERPL-IMP: SIGNIFICANT CHANGE UP
HGB BLD-MCNC: 12.9 G/DL — LOW (ref 13–17)
IMM GRANULOCYTES NFR BLD AUTO: 0.3 % — SIGNIFICANT CHANGE UP (ref 0–1.5)
LYMPHOCYTES # BLD AUTO: 1.78 K/UL — SIGNIFICANT CHANGE UP (ref 1–3.3)
LYMPHOCYTES # BLD AUTO: 15.5 % — SIGNIFICANT CHANGE UP (ref 13–44)
MAGNESIUM SERPL-MCNC: 2.2 MG/DL — SIGNIFICANT CHANGE UP (ref 1.6–2.6)
MCHC RBC-ENTMCNC: 29.1 PG — SIGNIFICANT CHANGE UP (ref 27–34)
MCHC RBC-ENTMCNC: 32.3 GM/DL — SIGNIFICANT CHANGE UP (ref 32–36)
MCV RBC AUTO: 90.1 FL — SIGNIFICANT CHANGE UP (ref 80–100)
MONOCYTES # BLD AUTO: 1.09 K/UL — HIGH (ref 0–0.9)
MONOCYTES NFR BLD AUTO: 9.5 % — SIGNIFICANT CHANGE UP (ref 2–14)
NEUTROPHILS # BLD AUTO: 8.48 K/UL — HIGH (ref 1.8–7.4)
NEUTROPHILS NFR BLD AUTO: 73.8 % — SIGNIFICANT CHANGE UP (ref 43–77)
NRBC # BLD: 0 /100 WBCS — SIGNIFICANT CHANGE UP (ref 0–0)
PHOSPHATE SERPL-MCNC: 2.5 MG/DL — SIGNIFICANT CHANGE UP (ref 2.5–4.5)
PLATELET # BLD AUTO: 124 K/UL — LOW (ref 150–400)
POTASSIUM SERPL-MCNC: 3.4 MMOL/L — LOW (ref 3.5–5.3)
POTASSIUM SERPL-SCNC: 3.4 MMOL/L — LOW (ref 3.5–5.3)
PROCALCITONIN SERPL-MCNC: 0.28 NG/ML — HIGH (ref 0.02–0.1)
PROT SERPL-MCNC: 6.8 G/DL — SIGNIFICANT CHANGE UP (ref 6–8.3)
RBC # BLD: 4.43 M/UL — SIGNIFICANT CHANGE UP (ref 4.2–5.8)
RBC # FLD: 14.5 % — SIGNIFICANT CHANGE UP (ref 10.3–14.5)
SODIUM SERPL-SCNC: 142 MMOL/L — SIGNIFICANT CHANGE UP (ref 135–145)
WBC # BLD: 11.49 K/UL — HIGH (ref 3.8–10.5)
WBC # FLD AUTO: 11.49 K/UL — HIGH (ref 3.8–10.5)

## 2021-07-03 PROCEDURE — 99233 SBSQ HOSP IP/OBS HIGH 50: CPT

## 2021-07-03 PROCEDURE — 93306 TTE W/DOPPLER COMPLETE: CPT | Mod: 26

## 2021-07-03 RX ORDER — POTASSIUM CHLORIDE 20 MEQ
40 PACKET (EA) ORAL ONCE
Refills: 0 | Status: COMPLETED | OUTPATIENT
Start: 2021-07-03 | End: 2021-07-03

## 2021-07-03 RX ADMIN — ENOXAPARIN SODIUM 40 MILLIGRAM(S): 100 INJECTION SUBCUTANEOUS at 11:18

## 2021-07-03 RX ADMIN — Medication 1 TABLET(S): at 11:18

## 2021-07-03 RX ADMIN — MONTELUKAST 10 MILLIGRAM(S): 4 TABLET, CHEWABLE ORAL at 00:28

## 2021-07-03 RX ADMIN — PIPERACILLIN AND TAZOBACTAM 25 GRAM(S): 4; .5 INJECTION, POWDER, LYOPHILIZED, FOR SOLUTION INTRAVENOUS at 13:08

## 2021-07-03 RX ADMIN — Medication 40 MILLIEQUIVALENT(S): at 14:38

## 2021-07-03 RX ADMIN — SODIUM CHLORIDE 75 MILLILITER(S): 9 INJECTION, SOLUTION INTRAVENOUS at 00:29

## 2021-07-03 RX ADMIN — PIPERACILLIN AND TAZOBACTAM 25 GRAM(S): 4; .5 INJECTION, POWDER, LYOPHILIZED, FOR SOLUTION INTRAVENOUS at 21:19

## 2021-07-03 RX ADMIN — BUDESONIDE AND FORMOTEROL FUMARATE DIHYDRATE 2 PUFF(S): 160; 4.5 AEROSOL RESPIRATORY (INHALATION) at 11:30

## 2021-07-03 RX ADMIN — BUDESONIDE AND FORMOTEROL FUMARATE DIHYDRATE 2 PUFF(S): 160; 4.5 AEROSOL RESPIRATORY (INHALATION) at 17:56

## 2021-07-03 RX ADMIN — ATORVASTATIN CALCIUM 10 MILLIGRAM(S): 80 TABLET, FILM COATED ORAL at 00:28

## 2021-07-03 RX ADMIN — SODIUM CHLORIDE 75 MILLILITER(S): 9 INJECTION, SOLUTION INTRAVENOUS at 13:08

## 2021-07-03 RX ADMIN — Medication 120 MILLIGRAM(S): at 21:19

## 2021-07-03 RX ADMIN — PIPERACILLIN AND TAZOBACTAM 25 GRAM(S): 4; .5 INJECTION, POWDER, LYOPHILIZED, FOR SOLUTION INTRAVENOUS at 06:14

## 2021-07-03 RX ADMIN — ATORVASTATIN CALCIUM 10 MILLIGRAM(S): 80 TABLET, FILM COATED ORAL at 21:19

## 2021-07-03 RX ADMIN — MONTELUKAST 10 MILLIGRAM(S): 4 TABLET, CHEWABLE ORAL at 21:19

## 2021-07-03 NOTE — DIETITIAN INITIAL EVALUATION ADULT. - OTHER INFO
89 yo Female  with hx of dementia, liver cirrhosis, c-spine fracture,  Depression  ,Hypercholesteremia  ,Hypertension  ,Osteoporosis  ,Primary biliary cirrhosis    SAH (subarachnoid hemorrhage)  s/p fall 2012 ,Seizure ,hx of  hysterectomy sent from [facility] for eval of anemia noted on blood work. Pt unable to provide any history due to dementia. Denies any symptoms when asked. PCP- Eran Parker . Found to have anemia 8/26 ,no signs of GIB ,seen by GI cons ,anemia workup is sent Palliative care consult requested ,to discuss advance directives and complete MOLST   Pt with no edema or PI at present 76M with hx of lung CA s/p RLL resection in 2018, atrial flutter s/p loop recorder on diltiazem but no blood thinners, BPH, DALIA on CPAP, fatty liver/cirrhosis, recent H.pylori infection (treated for 2 weeks, finished a week ago), kidney and gallstones who presents with chills and fevers.      Problems:  1.  Sepsis from unknown source   2.  Atrial flutter  3.  DALIA on CPAP  4.  Lung CA  5.  HLD  6.  BPH     Pt with no edema or PI at present 76M with hx of lung CA s/p RLL resection in 2018, atrial flutter s/p loop recorder on diltiazem but no blood thinners, BPH, DALIA on CPAP, fatty liver/cirrhosis, recent H.pylori infection (treated for 2 weeks, finished a week ago), kidney and gallstones who presents with chills and fevers.  Problems:1.  Sepsis from unknown source 2.  Atrial flutter3.  DALIA on CPAP 4.  Lung CA 5.  HLD 6.  BPH   Pt with no edema or PI at present  Pt denies constipation or diarrhea, no problems chewing or swallowing , just general decline in appetite will have glucerna supplement ordered

## 2021-07-03 NOTE — CONSULT NOTE ADULT - ASSESSMENT
75y/o seen at [facility] telemetry  History high cholesterol, A-Flutter, kidney stones, gall-stones, BPH, ?sleep apnea, fatty liver  2018 s/p right lower lobe resection for lung cancer  S/P implantable loop recorder by Dr. Ritchie at [facility] about 2 years ago  S/P prostate surgery    Admitted for fatigue, weakness and chills  In ER temperature spike to 104.3   WBC in ER-16.39  Telemetry strip about 12:36 am with possible A-Flutter with slow 2:1 block  Presently monitor-NSR  No cardiac complaints at present  Potassium-3.4    Impression  Possible sepsis  Paroxysmal A-Flutter    Plan:  - Continue present therapy  - Continue Diltiazem  - Patient on Zosyn  - Echocardiogram  - Eliquis should be started until patient can follow-up with Dr. Ritchie and have the implantable Loop recorder interrogated  - Potassium supps and follow labs

## 2021-07-03 NOTE — DIETITIAN INITIAL EVALUATION ADULT. - PHYSCIAL ASSESSMENT
well nourished/debilitated no overt signs of maln  likely meets criteria for mild maln r/t unintended wt loss as above and reduced po intake <75% of est needs for 1 mo

## 2021-07-03 NOTE — PROGRESS NOTE ADULT - SUBJECTIVE AND OBJECTIVE BOX
Patient is a 76y old  Male who presents with a chief complaint of fever (2021 10:49)      INTERVAL HPI/OVERNIGHT EVENTS:    no overnight events.  feeling better today.  Denies any SOB or CP.     MEDICATIONS  (STANDING):  atorvastatin 10 milliGRAM(s) Oral at bedtime  budesonide 160 MICROgram(s)/formoterol 4.5 MICROgram(s) Inhaler 2 Puff(s) Inhalation two times a day  diltiazem    milliGRAM(s) Oral at bedtime  enoxaparin Injectable 40 milliGRAM(s) SubCutaneous daily  lactated ringers. 1000 milliLiter(s) (75 mL/Hr) IV Continuous <Continuous>  montelukast 10 milliGRAM(s) Oral at bedtime  multivitamin 1 Tablet(s) Oral daily  piperacillin/tazobactam IVPB.. 3.375 Gram(s) IV Intermittent every 8 hours    MEDICATIONS  (PRN):      Allergies    Cherries: Cough (Other)  dust (Sneezing)  No Known Drug Allergies  shellfish (Other)  strawberry (Other)    Intolerances          Vital Signs Last 24 Hrs  T(C): 36.4 (2021 10:23), Max: 40.2 (2021 19:47)  T(F): 97.6 (2021 10:23), Max: 104.3 (2021 19:47)  HR: 75 (2021 10:23) (62 - 120)  BP: 108/68 (2021 10:23) (105/70 - 132/76)  BP(mean): --  RR: 18 (2021 10:23) (16 - 20)  SpO2: 96% (2021 10:23) (94% - 97%)    PHYSICAL EXAM:  GENERAL: NAD, well-groomed, well-developed  HEAD:  Atraumatic, Normocephalic  EYES: EOMI, PERRLA, conjunctiva and sclera clear  ENMT: Moist mucous membranes, No lesions; No tonsillar erythema, exudates, or enlargement  NECK: Supple, No JVD, Normal thyroid  NERVOUS SYSTEM:  Alert & Oriented X3, Good concentration; All 4 extremities mobile, no gross sensory deficits.   CHEST/LUNG: Clear to auscultation bilaterally; No rales, rhonchi, wheezing, or rubs  HEART: Regular rate and rhythm; No murmurs, rubs, or gallops  ABDOMEN: Soft, Nontender, Nondistended; Bowel sounds present  EXTREMITIES:  2+ Peripheral Pulses, No clubbing, cyanosis, or edema  LYMPH: No lymphadenopathy noted  SKIN: No rashes or lesions    LABS:                        12.9   11.49 )-----------( 124      ( 2021 07:17 )             39.9     2021 07:17    142    |  111    |  14     ----------------------------<  105    3.4     |  25     |  0.70     Ca    8.0        2021 07:17  Phos  2.5       2021 07:17  Mg     2.2       2021 07:17    TPro  6.8    /  Alb  2.7    /  TBili  1.0    /  DBili  x      /  AST  22     /  ALT  33     /  AlkPhos  47     2021 07:17    PT/INR - ( 2021 20:23 )   PT: 13.1 sec;   INR: 1.09 ratio         PTT - ( 2021 20:23 )  PTT:28.5 sec  Urinalysis Basic - ( 2021 20:57 )    Color: Yellow / Appearance: Clear / S.020 / pH: x  Gluc: x / Ketone: Negative  / Bili: Negative / Urobili: Negative mg/dL   Blood: x / Protein: 100 mg/dL / Nitrite: Negative   Leuk Esterase: Negative / RBC: 3-5 /HPF / WBC 3-5   Sq Epi: x / Non Sq Epi: Negative / Bacteria: Occasional      CAPILLARY BLOOD GLUCOSE          RADIOLOGY & ADDITIONAL TESTS:    Imaging Personally Reviewed:  [ ] YES     Consultant(s) Notes Reviewed:      Care Discussed with Consultants/Other Providers:    Advanced Directives: [ ] DNR  [ ] No feeding tube  [ ] MOLST in chart  [ ] MOLST completed today  [ ] Unknown

## 2021-07-03 NOTE — CONSULT NOTE ADULT - SUBJECTIVE AND OBJECTIVE BOX
[facility], Division of Infectious Diseases  PABLITO Frazier S. Shah, Y. Patel  [phone]    ANN BARRIENTOS  76y, Male  [id number]    HPI--  HPI:  76M with hx of lung CA s/p RLL resection in 2018, atrial flutter s/p loop recorder on diltiazem but no blood thinners, BPH, DALIA on CPAP, fatty liver/cirrhosis, HLD,. recent H.pylori infection (treated for 2 weeks, finished a week ago), kidney and gallstones who presents with chills.  Patient's symptoms started yesterday with feeling tired and fatigued.  Today, the symptoms worsened and then during dinner time, patient started to shiver with chills.  Family members then called 911.  Associated with nausea but no vomiting.  No documented fevers.  No pain anywhere.  No SOB, abdominal pain, diarrhea, cough, dysuria, diaphoresis.  No recent travel or known sick contacts.  Finished COVID vaccination series in 3/2021.  Has been losing weight about 8lbs in 1-2 months (says it might be diet related 2/2 reducing carb intake after being told his sugars were "high normal.").     In the ED, patient's triage vitals were /76    RR 20, 96%  T98.2F (eventually climbed to 104.3F).  Patient's labs showed leukocytosis of 16.4 with a left shift and a lactate of 2.6 (went down to 1.3).  Patient was empirically started on vancomycin and zosyn and given 2.2L NS bolus.  Currently the patient feels improved and has no acute complaints at the moment.   (2021 22:58)        Active Medications--  atorvastatin 10 milliGRAM(s) Oral at bedtime  budesonide 160 MICROgram(s)/formoterol 4.5 MICROgram(s) Inhaler 2 Puff(s) Inhalation two times a day  diltiazem    milliGRAM(s) Oral at bedtime  enoxaparin Injectable 40 milliGRAM(s) SubCutaneous daily  lactated ringers. 1000 milliLiter(s) IV Continuous <Continuous>  montelukast 10 milliGRAM(s) Oral at bedtime  multivitamin 1 Tablet(s) Oral daily  piperacillin/tazobactam IVPB.. 3.375 Gram(s) IV Intermittent every 8 hours    Antimicrobials:   piperacillin/tazobactam IVPB.. 3.375 Gram(s) IV Intermittent every 8 hours    Immunologic:     ROS:  CONSTITUTIONAL: No fevers or chills. No weakness or headache. No weight changes.  EYES/ENT: No visual or hearing changes. No sore throat or throat pain .  NECK: No pain or stiffness  RESPIRATORY: No cough, wheezing, or hemoptysis. No shortness of breath  CARDIOVASCULAR: No chest pain or palpitations  GASTROINTESTINAL: No abdominal pain. No nausea or vomiting. No diarrhea or constipation.  GENITOURINARY: No dysuria, frequency or hematuria  NEUROLOGICAL: No numbness or weakness  SKIN: No itching or rashes  PSYCHIATRIC: Pleasant. Appropriate affect    Allergies: Cherries: Cough (Other)  dust (Sneezing)  No Known Drug Allergies  shellfish (Other)  strawberry (Other)    PMH -- Foot fracture, left    Elevated triglycerides with high cholesterol    Asthma    BPH (Benign Prostatic Hyperplasia)    Renal calculi    Fatty liver    TB (tuberculosis)    DALIA on CPAP    Other nonspecific abnormal finding of lung field    Impaired glucose tolerance    Malignant neoplasm of lower lobe of right lung      PSH -- Foot fracture, left    Status post cataract extraction    H/O left inguinal hernia repair    History of prostate surgery      FH -- No pertinent family history in first degree relatives      Social History --  EtOH: denies   Tobacco: denies   Drug Use: denies     Travel/Environmental/Occupational History:    Physical Exam--  Vital Signs Last 24 Hrs  T(F): 97.6 (2021 10:23), Max: 104.3 (2021 19:47)  HR: 75 (2021 10:23) (62 - 120)  BP: 108/68 (2021 10:23) (105/70 - 132/76)  RR: 18 (2021 10:23) (16 - 20)  SpO2: 96% (2021 10:23) (94% - 97%)  General: nontoxic-appearing, no acute distress  HEENT: NC/AT, EOMI, anicteric, conjunctiva pink and moist, oropharynx clear, dentition fair  Neck: Not rigid. No sense of mass. No LAD  Lungs: Clear bilaterally without rales, wheezing or rhonchi  Heart: Regular rate and rhythm. No murmur, rub or gallop.  Abdomen: Soft. Nondistended. Nontender. Bowel sounds present. No organomegaly.  Back: No spinal tenderness. No costovertebral angle tenderness.  Extremities: No cyanosis or clubbing. No edema.   Skin: Warm. Dry. Good turgor. No rash. No vasculitic stigmata.    Laboratory & Imaging Data--  CBC:                       12.9   11.49 )-----------( 124      ( 2021 07:17 )             39.9     CMP:     142  |  111<H>  |  14  ----------------------------<  105<H>  3.4<L>   |  25  |  0.70    Ca    8.0<L>      2021 07:17  Phos  2.5     07-  Mg     2.2     07-    TPro  6.8  /  Alb  2.7<L>  /  TBili  1.0  /  DBili  x   /  AST  22  /  ALT  33  /  AlkPhos  47  07-03    LIVER FUNCTIONS - ( 2021 07:17 )  Alb: 2.7 g/dL / Pro: 6.8 g/dL / ALK PHOS: 47 U/L / ALT: 33 U/L DA / AST: 22 U/L / GGT: x           Urinalysis Basic - ( 2021 20:57 )    Color: Yellow / Appearance: Clear / S.020 / pH: x  Gluc: x / Ketone: Negative  / Bili: Negative / Urobili: Negative mg/dL   Blood: x / Protein: 100 mg/dL / Nitrite: Negative   Leuk Esterase: Negative / RBC: 3-5 /HPF / WBC 3-5   Sq Epi: x / Non Sq Epi: Negative / Bacteria: Occasional        Microbiology: reviewed      Radiology: reviewed  < from: CT Chest No Cont (21 @ 23:17) >    EXAM:  CT ABDOMEN AND PELVIS                          EXAM:  CT CHEST                                  PROCEDURE DATE:  2021          INTERPRETATION:  CLINICAL INFORMATION: Fever. Difficulty breathing.    COMPARISON: CT scan of the abdomen and pelvis from 2019 and CT scan of the chest from 3/6/2018    CONTRAST/COMPLICATIONS:  IV Contrast: NONE  Oral Contrast: NONE  Complications: None reported at time of study completion    PROCEDURE:  CT of the Chest, Abdomen and Pelvis was performed.  Sagittal and coronal reformats were performed.    FINDINGS:  CHEST:  LUNGS AND LARGE AIRWAYS: Patent central airways. Scarring in the left upper lobe and superior segment of the left lower lobe. Mild scarring at the right lung base. Focal consolidation or discrete mass seen. Sutures in the right infrahilar region. Correlate with patient's surgical history.  PLEURA: No pleural effusion.  VESSELS: Within normal limits.  HEART: Heart size is normal. No pericardial effusion.  MEDIASTINUM AND AGUILAR:No lymphadenopathy.  CHEST WALL AND LOWER NECK: Within normal limits.    ABDOMEN AND PELVIS:  LIVER: Small hepatic cyst in segment 2 and segment 5.  BILE DUCTS: Normal caliber.  GALLBLADDER: Within normal limits.  SPLEEN: Within normal limits.  PANCREAS: Within normal limits.  ADRENALS: Within normal limits.  KIDNEYS/URETERS: 6 mm nonobstructing stone in the upper pole of the right kidney. Left renal cysts which are grossly stable. Punctate nonobstructing stone in the lower pole the left kidney.    BLADDER: Within normal limits.  REPRODUCTIVE ORGANS: Prominent prostate gland.    BOWEL: No bowel obstruction. Appendix within normal limits.  PERITONEUM: No ascites.  VESSELS: Within normal limits.  RETROPERITONEUM/LYMPH NODES: No lymphadenopathy.  ABDOMINAL WALL: Small umbilical hernia containing fat.  BONES: Degenerative changes of bone. Old right seventh and eighth rib fractures.    IMPRESSION:  No acute pathology visualized.                DIONY HOOD MD; Attending Radiologist  This document has been electronically signed. Jul  3 2021 11:04AM    < end of copied text >     [facility], Division of Infectious Diseases  PABLITO Frazier S. Shah, Y. Patel  [phone]    ANN BARRIENTOS  76y, Male  [id number]    HPI--  HPI:  76M with hx of lung CA s/p RLL resection in 2018, atrial flutter s/p loop recorder on diltiazem but no blood thinners, BPH, DALIA on CPAP, fatty liver/cirrhosis, HLD,. recent H.pylori infection (treated for 2 weeks, finished a week ago), kidney and gallstones who presents with chills.  Patient's symptoms started yesterday with feeling tired and fatigued.  Today, the symptoms worsened and then during dinner time, patient started to shiver with chills.  Family members then called 911.  Associated with nausea but no vomiting.  No documented fevers.  No pain anywhere.  No SOB, abdominal pain, diarrhea, cough, dysuria, diaphoresis.  No recent travel or known sick contacts.  Finished COVID vaccination series in 3/2021.  Has been losing weight about 8lbs in 1-2 months (says it might be diet related 2/2 reducing carb intake after being told his sugars were "high normal.").     In the ED, patient's triage vitals were /76    RR 20, 96%  T98.2F (eventually climbed to 104.3F).  Patient's labs showed leukocytosis of 16.4 with a left shift and a lactate of 2.6 (went down to 1.3).  Patient was empirically started on vancomycin and zosyn and given 2.2L NS bolus.  Currently the patient feels improved and has no acute complaints at the moment.   (2021 22:58)    Pt seen and examined at bedside this afternoon.   Pt w/o complaints at this time  Denies f/c/n/v/d currently. Denies URI sx.   No recent travel.         Active Medications--  atorvastatin 10 milliGRAM(s) Oral at bedtime  budesonide 160 MICROgram(s)/formoterol 4.5 MICROgram(s) Inhaler 2 Puff(s) Inhalation two times a day  diltiazem    milliGRAM(s) Oral at bedtime  enoxaparin Injectable 40 milliGRAM(s) SubCutaneous daily  lactated ringers. 1000 milliLiter(s) IV Continuous <Continuous>  montelukast 10 milliGRAM(s) Oral at bedtime  multivitamin 1 Tablet(s) Oral daily  piperacillin/tazobactam IVPB.. 3.375 Gram(s) IV Intermittent every 8 hours    Antimicrobials:   piperacillin/tazobactam IVPB.. 3.375 Gram(s) IV Intermittent every 8 hours    Immunologic:     ROS:  CONSTITUTIONAL: No fevers or chills. No weakness or headache. No weight changes.  EYES/ENT: No visual or hearing changes. No sore throat or throat pain .  NECK: No pain or stiffness  RESPIRATORY: No cough, wheezing, or hemoptysis. No shortness of breath  CARDIOVASCULAR: No chest pain or palpitations  GASTROINTESTINAL: No abdominal pain. No nausea or vomiting. No diarrhea or constipation.  GENITOURINARY: No dysuria, frequency or hematuria  NEUROLOGICAL: No numbness or weakness  SKIN: No itching or rashes  PSYCHIATRIC: Pleasant. Appropriate affect    Allergies: Cherries: Cough (Other)  dust (Sneezing)  No Known Drug Allergies  shellfish (Other)  strawberry (Other)    PMH -- Foot fracture, left    Elevated triglycerides with high cholesterol    Asthma    BPH (Benign Prostatic Hyperplasia)    Renal calculi    Fatty liver    TB (tuberculosis)    DALIA on CPAP    Other nonspecific abnormal finding of lung field    Impaired glucose tolerance    Malignant neoplasm of lower lobe of right lung      PSH -- Foot fracture, left    Status post cataract extraction    H/O left inguinal hernia repair    History of prostate surgery      FH -- No pertinent family history in first degree relatives      Social History --  EtOH: denies   Tobacco: denies   Drug Use: denies     Travel/Environmental/Occupational History:    Physical Exam--  Vital Signs Last 24 Hrs  T(F): 97.6 (2021 10:23), Max: 104.3 (2021 19:47)  HR: 75 (2021 10:23) (62 - 120)  BP: 108/68 (2021 10:23) (105/70 - 132/76)  RR: 18 (2021 10:23) (16 - 20)  SpO2: 96% (2021 10:23) (94% - 97%)  General: nontoxic-appearing, no acute distress  HEENT: NC/AT, EOMI, anicteric, conjunctiva pink and moist, oropharynx clear, dentition fair  Neck: Not rigid. No sense of mass. No LAD  Lungs: Clear bilaterally without rales, wheezing or rhonchi  Heart: Regular rate and rhythm. No murmur, rub or gallop.  Abdomen: Soft. Nondistended. Nontender. Bowel sounds present. No organomegaly.  Back: No spinal tenderness. No costovertebral angle tenderness.  Extremities: No cyanosis or clubbing. No edema.   Skin: Warm. Dry. Good turgor. No rash. No vasculitic stigmata.    Laboratory & Imaging Data--  CBC:                       12.9   11.49 )-----------( 124      ( 2021 07:17 )             39.9     CMP:     142  |  111<H>  |  14  ----------------------------<  105<H>  3.4<L>   |  25  |  0.70    Ca    8.0<L>      2021 07:17  Phos  2.5     07-03  Mg     2.2     07-03    TPro  6.8  /  Alb  2.7<L>  /  TBili  1.0  /  DBili  x   /  AST  22  /  ALT  33  /  AlkPhos  47  07-03    LIVER FUNCTIONS - ( 2021 07:17 )  Alb: 2.7 g/dL / Pro: 6.8 g/dL / ALK PHOS: 47 U/L / ALT: 33 U/L DA / AST: 22 U/L / GGT: x           Urinalysis Basic - ( 2021 20:57 )    Color: Yellow / Appearance: Clear / S.020 / pH: x  Gluc: x / Ketone: Negative  / Bili: Negative / Urobili: Negative mg/dL   Blood: x / Protein: 100 mg/dL / Nitrite: Negative   Leuk Esterase: Negative / RBC: 3-5 /HPF / WBC 3-5   Sq Epi: x / Non Sq Epi: Negative / Bacteria: Occasional        Microbiology: reviewed      Radiology: reviewed  < from: CT Chest No Cont (21 @ 23:17) >    EXAM:  CT ABDOMEN AND PELVIS                          EXAM:  CT CHEST                                  PROCEDURE DATE:  2021          INTERPRETATION:  CLINICAL INFORMATION: Fever. Difficulty breathing.    COMPARISON: CT scan of the abdomen and pelvis from 2019 and CT scan of the chest from 3/6/2018    CONTRAST/COMPLICATIONS:  IV Contrast: NONE  Oral Contrast: NONE  Complications: None reported at time of study completion    PROCEDURE:  CT of the Chest, Abdomen and Pelvis was performed.  Sagittal and coronal reformats were performed.    FINDINGS:  CHEST:  LUNGS AND LARGE AIRWAYS: Patent central airways. Scarring in the left upper lobe and superior segment of the left lower lobe. Mild scarring at the right lung base. Focal consolidation or discrete mass seen. Sutures in the right infrahilar region. Correlate with patient's surgical history.  PLEURA: No pleural effusion.  VESSELS: Within normal limits.  HEART: Heart size is normal. No pericardial effusion.  MEDIASTINUM AND AGUILAR:No lymphadenopathy.  CHEST WALL AND LOWER NECK: Within normal limits.    ABDOMEN AND PELVIS:  LIVER: Small hepatic cyst in segment 2 and segment 5.  BILE DUCTS: Normal caliber.  GALLBLADDER: Within normal limits.  SPLEEN: Within normal limits.  PANCREAS: Within normal limits.  ADRENALS: Within normal limits.  KIDNEYS/URETERS: 6 mm nonobstructing stone in the upper pole of the right kidney. Left renal cysts which are grossly stable. Punctate nonobstructing stone in the lower pole the left kidney.    BLADDER: Within normal limits.  REPRODUCTIVE ORGANS: Prominent prostate gland.    BOWEL: No bowel obstruction. Appendix within normal limits.  PERITONEUM: No ascites.  VESSELS: Within normal limits.  RETROPERITONEUM/LYMPH NODES: No lymphadenopathy.  ABDOMINAL WALL: Small umbilical hernia containing fat.  BONES: Degenerative changes of bone. Old right seventh and eighth rib fractures.    IMPRESSION:  No acute pathology visualized.                DIONY HOOD MD; Attending Radiologist  This document has been electronically signed. Jul  3 2021 11:04AM    < end of copied text >

## 2021-07-03 NOTE — CONSULT NOTE ADULT - SUBJECTIVE AND OBJECTIVE BOX
CARDIOLOGY CONSULT NOTE    Patient is a 76y Male with a known history of :    HPI:  76M with hx of lung CA s/p RLL resection in 2018, atrial flutter s/p loop recorder on diltiazem but no blood thinners, BPH, DALIA on CPAP, fatty liver/cirrhosis, HLD,. recent H.pylori infection (treated for 2 weeks, finished a week ago), kidney and gallstones who presents with chills.  Patient's symptoms started yesterday with feeling tired and fatigued.  Today, the symptoms worsened and then during dinner time, patient started to shiver with chills.  Family members then called 911.  Associated with nausea but no vomiting.  No documented fevers.  No pain anywhere.  No SOB, abdominal pain, diarrhea, cough, dysuria, diaphoresis.  No recent travel or known sick contacts.  Finished COVID vaccination series in 3/2021.  Has been losing weight about 8lbs in 1-2 months (says it might be diet related 2/2 reducing carb intake after being told his sugars were "high normal.").     In the ED, patient's triage vitals were /76    RR 20, 96%  T98.2F (eventually climbed to 104.3F).  Patient's labs showed leukocytosis of 16.4 with a left shift and a lactate of 2.6 (went down to 1.3).  Patient was empirically started on vancomycin and zosyn and given 2.2L NS bolus.  Currently the patient feels improved and has no acute complaints at the moment.   (02 Jul 2021 22:58)      REVIEW OF SYSTEMS:    CONSTITUTIONAL: No fever, weight loss, or fatigue  EYES: No eye pain, visual disturbances, or discharge  ENMT:  No difficulty hearing, tinnitus, vertigo; No sinus or throat pain  NECK: No pain or stiffness  BREASTS: No pain, masses, or nipple discharge  RESPIRATORY: No cough, wheezing, chills or hemoptysis; No shortness of breath  CARDIOVASCULAR: No chest pain, palpitations, dizziness, or leg swelling  GASTROINTESTINAL: No abdominal or epigastric pain. No nausea, vomiting, or hematemesis; No diarrhea or constipation. No melena or hematochezia.  GENITOURINARY: No dysuria, frequency, hematuria, or incontinence  NEUROLOGICAL: No headaches, memory loss, loss of strength, numbness, or tremors  SKIN: No itching, burning, rashes, or lesions   LYMPH NODES: No enlarged glands  ENDOCRINE: No heat or cold intolerance; No hair loss  MUSCULOSKELETAL: No joint pain or swelling; No muscle, back, or extremity pain  PSYCHIATRIC: No depression, anxiety, mood swings, or difficulty sleeping  HEME/LYMPH: No easy bruising, or bleeding gums  ALLERGY AND IMMUNOLOGIC: No hives or eczema    MEDICATIONS  (STANDING):  atorvastatin 10 milliGRAM(s) Oral at bedtime  budesonide 160 MICROgram(s)/formoterol 4.5 MICROgram(s) Inhaler 2 Puff(s) Inhalation two times a day  diltiazem    milliGRAM(s) Oral at bedtime  enoxaparin Injectable 40 milliGRAM(s) SubCutaneous daily  lactated ringers. 1000 milliLiter(s) (75 mL/Hr) IV Continuous <Continuous>  montelukast 10 milliGRAM(s) Oral at bedtime  multivitamin 1 Tablet(s) Oral daily  piperacillin/tazobactam IVPB.. 3.375 Gram(s) IV Intermittent every 8 hours    MEDICATIONS  (PRN):      ALLERGIES: Cherries: Cough (Other)  dust (Sneezing)  No Known Drug Allergies  shellfish (Other)  strawberry (Other)      FAMILY HISTORY:  No pertinent family history in first degree relatives        Social History:  Alochol:   Smoking:   Drug Use:   Marital Status:     I&O's Detail    02 Jul 2021 07:01  -  03 Jul 2021 07:00  --------------------------------------------------------  IN:    Lactated Ringers: 600 mL    Oral Fluid: 480 mL  Total IN: 1080 mL    OUT:  Total OUT: 0 mL    Total NET: 1080 mL      03 Jul 2021 07:01  -  03 Jul 2021 13:29  --------------------------------------------------------  IN:    IV PiggyBack: 50 mL    Lactated Ringers: 525 mL  Total IN: 575 mL    OUT:  Total OUT: 0 mL    Total NET: 575 mL          PHYSICAL EXAMINATION:  -----------------------------  T(C): 36.4 (07-03-21 @ 10:23), Max: 40.2 (07-02-21 @ 19:47)  HR: 75 (07-03-21 @ 10:23) (62 - 120)  BP: 108/68 (07-03-21 @ 10:23) (105/70 - 132/76)  RR: 18 (07-03-21 @ 10:23) (16 - 20)  SpO2: 96% (07-03-21 @ 10:23) (94% - 97%)  Wt(kg): --    07-02 @ 07:01  -  07-03 @ 07:00  --------------------------------------------------------  IN:    Lactated Ringers: 600 mL    Oral Fluid: 480 mL  Total IN: 1080 mL    OUT:  Total OUT: 0 mL    Total NET: 1080 mL      07-03 @ 07:01  -  07-03 @ 13:29  --------------------------------------------------------  IN:    IV PiggyBack: 50 mL    Lactated Ringers: 525 mL  Total IN: 575 mL    OUT:  Total OUT: 0 mL    Total NET: 575 mL        Height (cm): 167.6 (07-02 @ 19:36)  Weight (kg): 72.575 (07-02 @ 19:36)  BMI (kg/m2): 25.8 (07-02 @ 19:36)  BSA (m2): 1.82 (07-02 @ 19:36)    Constitutional: well developed, normal appearance, well groomed, well nourished, no deformities and no acute distress.   Eyes: the conjunctiva exhibited no abnormalities and the eyelids demonstrated no xanthelasmas.   HEENT: normal oral mucosa, no oral pallor and no oral cyanosis.   Neck: normal jugular venous A waves present, normal jugular venous V waves present and no jugular venous ibarra A waves.   Pulmonary: no respiratory distress, normal respiratory rhythm and effort, no accessory muscle use and lungs were clear to auscultation bilaterally.   Cardiovascular: heart rate and rhythm were normal, normal S1 and S2 and no murmur, gallop, rub, heave or thrill are present.   Musculoskeletal: the gait could not be assessed.   Extremities: no clubbing of the fingernails, no localized cyanosis, no petechial hemorrhages and no ischemic changes.   Skin: normal skin color and pigmentation, no rash, no venous stasis, no skin lesions, no skin ulcer and no xanthoma was observed.   Psychiatric: oriented to person, place, and time, the affect was normal, the mood was normal and not feeling anxious.     LABS:   --------  07-03    142  |  111<H>  |  14  ----------------------------<  105<H>  3.4<L>   |  25  |  0.70    Ca    8.0<L>      03 Jul 2021 07:17  Phos  2.5     07-03  Mg     2.2     07-03    TPro  6.8  /  Alb  2.7<L>  /  TBili  1.0  /  DBili  x   /  AST  22  /  ALT  33  /  AlkPhos  47  07-03                         12.9   11.49 )-----------( 124      ( 03 Jul 2021 07:17 )             39.9     PT/INR - ( 02 Jul 2021 20:23 )   PT: 13.1 sec;   INR: 1.09 ratio         PTT - ( 02 Jul 2021 20:23 )  PTT:28.5 sec              RADIOLOGY:  -----------------        ECG: Admitting tracing-sinus tachycardia at 120/minute

## 2021-07-03 NOTE — CONSULT NOTE ADULT - ASSESSMENT
Pt is a 76M w/ PMHx of lung CA s/p RLL resection in 2018, atrial flutter s/p loop recorder on diltiazem but no blood thinners, BPH, DALIA on CPAP, fatty liver/cirrhosis, HLD,. recent H.pylori infection (treated for 2 weeks, finished a week ago), kidney and gallstones who presents with chills.    In the ED, patient's triage vitals were /76    RR 20, 96%  T98.2F (eventually climbed to 104.3F).  Patient's labs showed leukocytosis of 16.4 with a left shift and a lactate of 2.6 (went down to 1.3).  Patient was empirically started on vancomycin and zosyn and given 2.2L NS bolus.   ID c/s for further evaluation of sepsis.    Sepsis 2/2...  Fevers  Leukocytosis  Lactic Acidosis  -infectious w/u pending  -imaging pending  -trend temps/WBC  -supportive care      **FULL CONSULT NOTE TO FOLLOW PENDING EVALUATION**    Infectious Diseases will continue to follow. Please call with any questions.   Tatiana Rogers M.D.  [facility], Division of Infectious Diseases [phone]  For over the weekend and after hours, please call [phone]   Pt is a 76M w/ PMHx of lung CA s/p RLL resection in 2018, atrial flutter s/p loop recorder on diltiazem but no blood thinners, BPH, DALIA on CPAP, fatty liver/cirrhosis, HLD, recent H.pylori infection (treated for 2 weeks, finished a week ago), kidney and gallstones who presents with chills.    In the ED, patient's triage vitals were /76    RR 20, 96%  T98.2F (eventually climbed to 104.3F).  Patient's labs showed leukocytosis of 16.4 with a left shift and a lactate of 2.6 (went down to 1.3).  Patient was empirically started on vancomycin and zosyn and given 2.2L NS bolus.   ID c/s for further evaluation of sepsis.    Sepsis 2/2 unclear etiology  Fevers-resolved  Leukocytosis-downtrending  Lactic Acidosis-resolved  -infectious w/u pending. repeat if febrile again  --UCx pending  --BCx pending  -imaging reviewed. CXR and CT C/A/P negative for foci of infection  -trend temps/WBC  --fevers resolved. leukocytosis downtrending. lactate normalized.  -supportive care  -d/c vancomycin, low suspicion for MRSA etiology of infection  -c/w zosyn for now pending cx.   Will narrow therapy and duration pending above w/u    Infectious Diseases will continue to follow. Please call with any questions.   Tatiana Rogers M.D.  [facility], Division of Infectious Diseases [phone]  For over the weekend and after hours, please call [phone]

## 2021-07-03 NOTE — DIETITIAN INITIAL EVALUATION ADULT. - ORAL INTAKE PTA/DIET HISTORY
usual bkfst : fresh fruit, oatmeal, egg and water usual lunch and supper are similar - bread, fish and a vegetable. He reports he is a diet controlled diabetic and avoids sugar

## 2021-07-04 LAB
ALBUMIN SERPL ELPH-MCNC: 2.8 G/DL — LOW (ref 3.3–5)
ALP SERPL-CCNC: 54 U/L — SIGNIFICANT CHANGE UP (ref 30–120)
ALT FLD-CCNC: 36 U/L DA — SIGNIFICANT CHANGE UP (ref 10–60)
ANION GAP SERPL CALC-SCNC: 9 MMOL/L — SIGNIFICANT CHANGE UP (ref 5–17)
AST SERPL-CCNC: 25 U/L — SIGNIFICANT CHANGE UP (ref 10–40)
BILIRUB SERPL-MCNC: 0.8 MG/DL — SIGNIFICANT CHANGE UP (ref 0.2–1.2)
BUN SERPL-MCNC: 12 MG/DL — SIGNIFICANT CHANGE UP (ref 7–23)
CALCIUM SERPL-MCNC: 8.5 MG/DL — SIGNIFICANT CHANGE UP (ref 8.4–10.5)
CHLORIDE SERPL-SCNC: 106 MMOL/L — SIGNIFICANT CHANGE UP (ref 96–108)
CO2 SERPL-SCNC: 28 MMOL/L — SIGNIFICANT CHANGE UP (ref 22–31)
COVID-19 SPIKE DOMAIN AB INTERP: POSITIVE
COVID-19 SPIKE DOMAIN ANTIBODY RESULT: >250 U/ML — HIGH
CREAT SERPL-MCNC: 0.88 MG/DL — SIGNIFICANT CHANGE UP (ref 0.5–1.3)
CULTURE RESULTS: NO GROWTH — SIGNIFICANT CHANGE UP
GLUCOSE SERPL-MCNC: 103 MG/DL — HIGH (ref 70–99)
HCT VFR BLD CALC: 42.2 % — SIGNIFICANT CHANGE UP (ref 39–50)
HGB BLD-MCNC: 13.7 G/DL — SIGNIFICANT CHANGE UP (ref 13–17)
MCHC RBC-ENTMCNC: 29.7 PG — SIGNIFICANT CHANGE UP (ref 27–34)
MCHC RBC-ENTMCNC: 32.5 GM/DL — SIGNIFICANT CHANGE UP (ref 32–36)
MCV RBC AUTO: 91.5 FL — SIGNIFICANT CHANGE UP (ref 80–100)
NRBC # BLD: 0 /100 WBCS — SIGNIFICANT CHANGE UP (ref 0–0)
PLATELET # BLD AUTO: 153 K/UL — SIGNIFICANT CHANGE UP (ref 150–400)
POTASSIUM SERPL-MCNC: 3.4 MMOL/L — LOW (ref 3.5–5.3)
POTASSIUM SERPL-SCNC: 3.4 MMOL/L — LOW (ref 3.5–5.3)
PROT SERPL-MCNC: 7.5 G/DL — SIGNIFICANT CHANGE UP (ref 6–8.3)
RBC # BLD: 4.61 M/UL — SIGNIFICANT CHANGE UP (ref 4.2–5.8)
RBC # FLD: 14.5 % — SIGNIFICANT CHANGE UP (ref 10.3–14.5)
SARS-COV-2 IGG+IGM SERPL QL IA: >250 U/ML — HIGH
SARS-COV-2 IGG+IGM SERPL QL IA: POSITIVE
SODIUM SERPL-SCNC: 143 MMOL/L — SIGNIFICANT CHANGE UP (ref 135–145)
SPECIMEN SOURCE: SIGNIFICANT CHANGE UP
WBC # BLD: 9.43 K/UL — SIGNIFICANT CHANGE UP (ref 3.8–10.5)
WBC # FLD AUTO: 9.43 K/UL — SIGNIFICANT CHANGE UP (ref 3.8–10.5)

## 2021-07-04 PROCEDURE — 99233 SBSQ HOSP IP/OBS HIGH 50: CPT

## 2021-07-04 PROCEDURE — 93970 EXTREMITY STUDY: CPT | Mod: 26

## 2021-07-04 RX ORDER — POTASSIUM CHLORIDE 20 MEQ
40 PACKET (EA) ORAL EVERY 4 HOURS
Refills: 0 | Status: COMPLETED | OUTPATIENT
Start: 2021-07-04 | End: 2021-07-04

## 2021-07-04 RX ADMIN — ENOXAPARIN SODIUM 40 MILLIGRAM(S): 100 INJECTION SUBCUTANEOUS at 11:52

## 2021-07-04 RX ADMIN — Medication 40 MILLIEQUIVALENT(S): at 18:23

## 2021-07-04 RX ADMIN — BUDESONIDE AND FORMOTEROL FUMARATE DIHYDRATE 2 PUFF(S): 160; 4.5 AEROSOL RESPIRATORY (INHALATION) at 18:22

## 2021-07-04 RX ADMIN — PIPERACILLIN AND TAZOBACTAM 25 GRAM(S): 4; .5 INJECTION, POWDER, LYOPHILIZED, FOR SOLUTION INTRAVENOUS at 21:16

## 2021-07-04 RX ADMIN — PIPERACILLIN AND TAZOBACTAM 25 GRAM(S): 4; .5 INJECTION, POWDER, LYOPHILIZED, FOR SOLUTION INTRAVENOUS at 05:57

## 2021-07-04 RX ADMIN — Medication 120 MILLIGRAM(S): at 21:16

## 2021-07-04 RX ADMIN — BUDESONIDE AND FORMOTEROL FUMARATE DIHYDRATE 2 PUFF(S): 160; 4.5 AEROSOL RESPIRATORY (INHALATION) at 06:40

## 2021-07-04 RX ADMIN — Medication 1 TABLET(S): at 11:52

## 2021-07-04 RX ADMIN — ATORVASTATIN CALCIUM 10 MILLIGRAM(S): 80 TABLET, FILM COATED ORAL at 21:16

## 2021-07-04 RX ADMIN — MONTELUKAST 10 MILLIGRAM(S): 4 TABLET, CHEWABLE ORAL at 21:16

## 2021-07-04 RX ADMIN — PIPERACILLIN AND TAZOBACTAM 25 GRAM(S): 4; .5 INJECTION, POWDER, LYOPHILIZED, FOR SOLUTION INTRAVENOUS at 13:25

## 2021-07-04 RX ADMIN — Medication 40 MILLIEQUIVALENT(S): at 12:45

## 2021-07-04 NOTE — PROGRESS NOTE ADULT - NUTRITIONAL ASSESSMENT
This patient has been assessed with a concern for Malnutrition and has been determined to have a diagnosis/diagnoses of Mild protein-calorie malnutrition.    This patient is being managed with:   Diet DASH/TLC-  Sodium & Cholesterol Restricted  Entered: Jul 2 2021 10:53PM    The following pending diet order is being considered for treatment of Mild protein-calorie malnutrition:  Diet Consistent Carbohydrate w/Evening Snack-  DASH/TLC {Sodium & Cholesterol Restricted}  Supplement Feeding Modality:  Oral  Glucerna Shake Cans or Servings Per Day:  1       Frequency:  Two Times a day  Entered: Jul  3 2021  3:04PM

## 2021-07-04 NOTE — PROGRESS NOTE ADULT - SUBJECTIVE AND OBJECTIVE BOX
Patient is a 76y Male with a known history of :    HPI:  76M with hx of lung CA s/p RLL resection in 2018, atrial flutter s/p loop recorder on diltiazem but no blood thinners, BPH, DALIA on CPAP, fatty liver/cirrhosis, HLD,. recent H.pylori infection (treated for 2 weeks, finished a week ago), kidney and gallstones who presents with chills.  Patient's symptoms started yesterday with feeling tired and fatigued.  Today, the symptoms worsened and then during dinner time, patient started to shiver with chills.  Family members then called 911.  Associated with nausea but no vomiting.  No documented fevers.  No pain anywhere.  No SOB, abdominal pain, diarrhea, cough, dysuria, diaphoresis.  No recent travel or known sick contacts.  Finished COVID vaccination series in 3/2021.  Has been losing weight about 8lbs in 1-2 months (says it might be diet related 2/2 reducing carb intake after being told his sugars were "high normal.").     In the ED, patient's triage vitals were /76    RR 20, 96%  T98.2F (eventually climbed to 104.3F).  Patient's labs showed leukocytosis of 16.4 with a left shift and a lactate of 2.6 (went down to 1.3).  Patient was empirically started on vancomycin and zosyn and given 2.2L NS bolus.  Currently the patient feels improved and has no acute complaints at the moment.   (02 Jul 2021 22:58)      REVIEW OF SYSTEMS:    CONSTITUTIONAL: No fever, weight loss, or fatigue  EYES: No eye pain, visual disturbances, or discharge  ENMT:  No difficulty hearing, tinnitus, vertigo; No sinus or throat pain  NECK: No pain or stiffness  BREASTS: No pain, masses, or nipple discharge  RESPIRATORY: No cough, wheezing, chills or hemoptysis; No shortness of breath  CARDIOVASCULAR: No chest pain, palpitations, dizziness, or leg swelling  GASTROINTESTINAL: No abdominal or epigastric pain. No nausea, vomiting, or hematemesis; No diarrhea or constipation. No melena or hematochezia.  GENITOURINARY: No dysuria, frequency, hematuria, or incontinence  NEUROLOGICAL: No headaches, memory loss, loss of strength, numbness, or tremors  SKIN: No itching, burning, rashes, or lesions   LYMPH NODES: No enlarged glands  ENDOCRINE: No heat or cold intolerance; No hair loss  MUSCULOSKELETAL: No joint pain or swelling; No muscle, back, or extremity pain  PSYCHIATRIC: No depression, anxiety, mood swings, or difficulty sleeping  HEME/LYMPH: No easy bruising, or bleeding gums  ALLERGY AND IMMUNOLOGIC: No hives or eczema    MEDICATIONS  (STANDING):  atorvastatin 10 milliGRAM(s) Oral at bedtime  budesonide 160 MICROgram(s)/formoterol 4.5 MICROgram(s) Inhaler 2 Puff(s) Inhalation two times a day  diltiazem    milliGRAM(s) Oral at bedtime  enoxaparin Injectable 40 milliGRAM(s) SubCutaneous daily  lactated ringers. 1000 milliLiter(s) (75 mL/Hr) IV Continuous <Continuous>  montelukast 10 milliGRAM(s) Oral at bedtime  multivitamin 1 Tablet(s) Oral daily  piperacillin/tazobactam IVPB.. 3.375 Gram(s) IV Intermittent every 8 hours    MEDICATIONS  (PRN):      ALLERGIES: Cherries: Cough (Other)  dust (Sneezing)  No Known Drug Allergies  shellfish (Other)  strawberry (Other)      FAMILY HISTORY:  No pertinent family history in first degree relatives        Social history:  Alochol:   Smoking:   Drug Use:   Marital Status:     PHYSICAL EXAMINATION:  -----------------------------  T(C): 36.9 (07-04-21 @ 10:35), Max: 36.9 (07-04-21 @ 10:35)  HR: 72 (07-04-21 @ 10:35) (72 - 88)  BP: 104/65 (07-04-21 @ 10:35) (101/65 - 122/74)  RR: 18 (07-04-21 @ 10:35) (16 - 18)  SpO2: 95% (07-04-21 @ 10:35) (95% - 96%)  Wt(kg): --    07-03 @ 07:01  -  07-04 @ 07:00  --------------------------------------------------------  IN:    IV PiggyBack: 200 mL    Lactated Ringers: 1725 mL  Total IN: 1925 mL    OUT:    Voided (mL): 1250 mL  Total OUT: 1250 mL    Total NET: 675 mL      07-04 @ 07:01  -  07-04 @ 11:19  --------------------------------------------------------  IN:    Oral Fluid: 600 mL  Total IN: 600 mL    OUT:  Total OUT: 0 mL    Total NET: 600 mL            Constitutional: well developed, normal appearance, well groomed, well nourished, no deformities and no acute distress.   Eyes: the conjunctiva exhibited no abnormalities and the eyelids demonstrated no xanthelasmas.   HEENT: normal oral mucosa, no oral pallor and no oral cyanosis.   Neck: normal jugular venous A waves present, normal jugular venous V waves present and no jugular venous ibarra A waves.   Pulmonary: no respiratory distress, normal respiratory rhythm and effort, no accessory muscle use and lungs were clear to auscultation bilaterally.   Cardiovascular: heart rate and rhythm were normal, normal S1 and S2 and no murmur, gallop, rub, heave or thrill are present.   Musculoskeletal: the gait could not be assessed.   Extremities: no clubbing of the fingernails, no localized cyanosis, no petechial hemorrhages and no ischemic changes.   Skin: normal skin color and pigmentation, no rash, no venous stasis, no skin lesions, no skin ulcer and no xanthoma was observed.   Psychiatric: oriented to person, place, and time, the affect was normal, the mood was normal and not feeling anxious.     LABS:   --------  07-03    142  |  111<H>  |  14  ----------------------------<  105<H>  3.4<L>   |  25  |  0.70    Ca    8.0<L>      03 Jul 2021 07:17  Phos  2.5     07-03  Mg     2.2     07-03    TPro  6.8  /  Alb  2.7<L>  /  TBili  1.0  /  DBili  x   /  AST  22  /  ALT  33  /  AlkPhos  47  07-03                         12.9   11.49 )-----------( 124      ( 03 Jul 2021 07:17 )             39.9     PT/INR - ( 02 Jul 2021 20:23 )   PT: 13.1 sec;   INR: 1.09 ratio         PTT - ( 02 Jul 2021 20:23 )  PTT:28.5 sec        Culture Results:   No growth (07-03 @ 13:04)    07-03 @ 13:04    Organism --   Gram Stain Blood -- Gram Stain --  Specimen Source .Urine Clean Catch (Midstream)  Culture-Blood --        Radiology:    < from: US Transthoracic Echocardiogram w/Doppler Complete (07.03.21 @ 14:35) >    EXAM:  US TTE W DOPPLER COMPLETE                                  PROCEDURE DATE:  07/03/2021          INTERPRETATION:  Ordering Physician: ELLYN NEVAREZ [id number]    Indication: Fever    Technician: SS    Study Quality: Satisfactory    Height: 5 feet 6 inches  Weight: 158 pounds  Blood Pressure: 108/68    MEASUREMENTS  IVS: 1.0  PWT: 1.0  LA: 3.7  Aortic Root: 2.8  LVIDd: 4.3  LVIDs: 3.2    LVEF: 60%    FINDINGS  Left Ventricle: Left ventricle is of normal size and wall thickness overall systolic function is normal  Right Ventricle: Right ventricle is of normal size and  Left Atrium: Left atrium is normal  Right Atrium: Right atrium is normal  Mitral Valve: Mitral valve has adequate leaflet excursion there is no evidence of mitral stenosisor mitral regurgitation  Aortic Valve: Aortic valve is normal in morphology and function moderate aortic insufficiency  Tricuspid Valve: Tricuspid valve reveals mild tricuspid regurgitation with mild elevation of right-sided pressures  Pulmonic Valve: Pulmonic valve is normal in leaflet excursion there is mild pulmonic insufficiency  Pericardium/Pleura: No evidence of pericardial effusion      IMPRESSION:  1. Normal left ventricular systolic function  2. mild tricuspid regurgitation with mild elevation of right-sided pressures  3. mild pulmonic insufficiency  4. moderate aortic insufficiency              YAHIR GALLARDO MD; Attending Cardiologist  This document has been electronically signed. Jul  3 2021  7:00PM    < end of copied text >

## 2021-07-04 NOTE — PROGRESS NOTE ADULT - SUBJECTIVE AND OBJECTIVE BOX
[facility], Division of Infectious Diseases  PABLITO Frazier Y. Patel, S. Shah  [phone]    Name: ANN BARRIENTOS  Age: 76y  Gender: Male  MRN: [id number]    Interval History:  Patient seen and examined at bedside this morning  No acute overnight events.   Notes reviewed    Antibiotics:  piperacillin/tazobactam IVPB.. 3.375 Gram(s) IV Intermittent every 8 hours      Medications:  atorvastatin 10 milliGRAM(s) Oral at bedtime  budesonide 160 MICROgram(s)/formoterol 4.5 MICROgram(s) Inhaler 2 Puff(s) Inhalation two times a day  diltiazem    milliGRAM(s) Oral at bedtime  enoxaparin Injectable 40 milliGRAM(s) SubCutaneous daily  lactated ringers. 1000 milliLiter(s) IV Continuous <Continuous>  montelukast 10 milliGRAM(s) Oral at bedtime  multivitamin 1 Tablet(s) Oral daily  piperacillin/tazobactam IVPB.. 3.375 Gram(s) IV Intermittent every 8 hours      Review of Systems:  A 10-point review of systems was obtained.     Pertinent positives and negatives--  Constitutional: No fevers. No Chills. No Rigors.   Cardiovascular: No chest pain. No palpitations.  Respiratory: No shortness of breath. No cough.  Gastrointestinal: No nausea or vomiting. No diarrhea or constipation.   Psychiatric: Pleasant. Appropriate affect.    Review of systems otherwise negative except as previously noted.    Allergies: Cherries: Cough (Other)  dust (Sneezing)  No Known Drug Allergies  shellfish (Other)  strawberry (Other)    For details regarding the patient's past medical history, social history, family history, and other miscellaneous elements, please refer the initial infectious diseases consultation and/or the admitting history and physical examination for this admission.    Objective:  Vitals:   T(C): 36.3 (21 @ 05:00), Max: 36.8 (21 @ 21:51)  HR: 86 (21 @ 08:19) (72 - 88)  BP: 115/74 (21 @ 05:00) (101/65 - 122/74)  RR: 16 (21 @ 05:00) (16 - 18)  SpO2: 96% (21 @ 08:19) (95% - 96%)    Physical Examination:  General: no acute distress  HEENT: NC/AT, EOMI, anicteric, no oral lesions  Neck: supple, no palpable LAD  Cardio: S1, S2 heard, RRR, no murmurs  Resp: breath sounds heard bilaterally, no rales, wheezes or rhonchi  Abd: soft, NT, ND, + bowel sounds  Neuro: no obvious focal deficits  Ext: no edema or cyanosis  Skin: warm, dry, no visible rash      Laboratory Studies:  CBC:                       12.9   11.49 )-----------( 124      ( 2021 07:17 )             39.9     CMP: -    142  |  111<H>  |  14  ----------------------------<  105<H>  3.4<L>   |  25  |  0.70    Ca    8.0<L>      2021 07:17  Phos  2.5     07-03  Mg     2.2     07-03    TPro  6.8  /  Alb  2.7<L>  /  TBili  1.0  /  DBili  x   /  AST  22  /  ALT  33  /  AlkPhos  47  07-03    LIVER FUNCTIONS - ( 2021 07:17 )  Alb: 2.7 g/dL / Pro: 6.8 g/dL / ALK PHOS: 47 U/L / ALT: 33 U/L DA / AST: 22 U/L / GGT: x           Urinalysis Basic - ( 2021 20:57 )    Color: Yellow / Appearance: Clear / S.020 / pH: x  Gluc: x / Ketone: Negative  / Bili: Negative / Urobili: Negative mg/dL   Blood: x / Protein: 100 mg/dL / Nitrite: Negative   Leuk Esterase: Negative / RBC: 3-5 /HPF / WBC 3-5   Sq Epi: x / Non Sq Epi: Negative / Bacteria: Occasional        Microbiology: reviewed      Radiology: reviewed       [facility], Division of Infectious Diseases  PABLITO Frazier Y. Patel, S. Shah  [phone]    Name: ANN BARRIENTOS  Age: 76y  Gender: Male  MRN: [id number]    Interval History:  Patient seen and examined at bedside this morning  No acute overnight events. Afebrile overnight  Notes reviewed    Antibiotics:  piperacillin/tazobactam IVPB.. 3.375 Gram(s) IV Intermittent every 8 hours      Medications:  atorvastatin 10 milliGRAM(s) Oral at bedtime  budesonide 160 MICROgram(s)/formoterol 4.5 MICROgram(s) Inhaler 2 Puff(s) Inhalation two times a day  diltiazem    milliGRAM(s) Oral at bedtime  enoxaparin Injectable 40 milliGRAM(s) SubCutaneous daily  lactated ringers. 1000 milliLiter(s) IV Continuous <Continuous>  montelukast 10 milliGRAM(s) Oral at bedtime  multivitamin 1 Tablet(s) Oral daily  piperacillin/tazobactam IVPB.. 3.375 Gram(s) IV Intermittent every 8 hours      Review of Systems:  A 10-point review of systems was obtained.     Pertinent positives and negatives--  Constitutional: No fevers. No Chills. No Rigors.   Cardiovascular: No chest pain. No palpitations.  Respiratory: No shortness of breath. No cough.  Gastrointestinal: No nausea or vomiting. No diarrhea or constipation.   Psychiatric: Pleasant. Appropriate affect.    Review of systems otherwise negative except as previously noted.    Allergies: Cherries: Cough (Other)  dust (Sneezing)  No Known Drug Allergies  shellfish (Other)  strawberry (Other)    For details regarding the patient's past medical history, social history, family history, and other miscellaneous elements, please refer the initial infectious diseases consultation and/or the admitting history and physical examination for this admission.    Objective:  Vitals:   T(C): 36.3 (21 @ 05:00), Max: 36.8 (21 @ 21:51)  HR: 86 (21 @ 08:19) (72 - 88)  BP: 115/74 (21 @ 05:00) (101/65 - 122/74)  RR: 16 (21 @ 05:00) (16 - 18)  SpO2: 96% (21 @ 08:19) (95% - 96%)    Physical Examination:  General: no acute distress  HEENT: NC/AT, EOMI, anicteric, no oral lesions  Neck: supple, no palpable LAD  Cardio: S1, S2 heard, RRR, no murmurs  Resp: breath sounds heard bilaterally, no rales, wheezes or rhonchi  Abd: soft, NT, ND, + bowel sounds  Neuro: no obvious focal deficits  Ext: no edema or cyanosis  Skin: warm, dry, no visible rash      Laboratory Studies:  CBC:                       12.9   11.49 )-----------( 124      ( 2021 07:17 )             39.9     CMP:     142  |  111<H>  |  14  ----------------------------<  105<H>  3.4<L>   |  25  |  0.70    Ca    8.0<L>      2021 07:17  Phos  2.5     07-03  Mg     2.2     07-03    TPro  6.8  /  Alb  2.7<L>  /  TBili  1.0  /  DBili  x   /  AST  22  /  ALT  33  /  AlkPhos  47  07-03    LIVER FUNCTIONS - ( 2021 07:17 )  Alb: 2.7 g/dL / Pro: 6.8 g/dL / ALK PHOS: 47 U/L / ALT: 33 U/L DA / AST: 22 U/L / GGT: x           Urinalysis Basic - ( 2021 20:57 )    Color: Yellow / Appearance: Clear / S.020 / pH: x  Gluc: x / Ketone: Negative  / Bili: Negative / Urobili: Negative mg/dL   Blood: x / Protein: 100 mg/dL / Nitrite: Negative   Leuk Esterase: Negative / RBC: 3-5 /HPF / WBC 3-5   Sq Epi: x / Non Sq Epi: Negative / Bacteria: Occasional        Microbiology: reviewed      Radiology: reviewed  < from: US Transthoracic Echocardiogram w/Doppler Complete (21 @ 14:35) >    EXAM:  US TTE W DOPPLER COMPLETE                                  PROCEDURE DATE:  2021          INTERPRETATION:  Ordering Physician: ELLYN NEVAREZ [id number]    Indication: Fever    Technician: SS    Study Quality: Satisfactory    Height: 5 feet 6 inches  Weight: 158 pounds  Blood Pressure: 108/68    MEASUREMENTS  IVS: 1.0  PWT: 1.0  LA: 3.7  Aortic Root: 2.8  LVIDd: 4.3  LVIDs: 3.2    LVEF: 60%    FINDINGS  Left Ventricle: Left ventricle is of normal size and wall thickness overall systolic function is normal  Right Ventricle: Right ventricle is of normal size and  Left Atrium: Left atrium is normal  Right Atrium: Right atrium is normal  Mitral Valve: Mitral valve has adequate leaflet excursion there is no evidence of mitral stenosisor mitral regurgitation  Aortic Valve: Aortic valve is normal in morphology and function moderate aortic insufficiency  Tricuspid Valve: Tricuspid valve reveals mild tricuspid regurgitation with mild elevation of right-sided pressures  Pulmonic Valve: Pulmonic valve is normal in leaflet excursion there is mild pulmonic insufficiency  Pericardium/Pleura: No evidence of pericardial effusion      IMPRESSION:  1. Normal left ventricular systolic function  2. mild tricuspid regurgitation with mild elevation of right-sided pressures  3. mild pulmonic insufficiency  4. moderate aortic insufficiency              YAHIR GALLARDO MD; Attending Cardiologist  This document has been electronically signed. Jul  3 2021  7:00PM    < end of copied text >  < from: CT Abdomen and Pelvis No Cont (21 @ 23:17) >    EXAM:  CT ABDOMEN AND PELVIS                          EXAM:  CT CHEST                                  PROCEDURE DATE:  2021          INTERPRETATION:  CLINICAL INFORMATION: Fever. Difficulty breathing.    COMPARISON: CT scan of the abdomen and pelvis from 2019 and CT scan of the chest from 3/6/2018    CONTRAST/COMPLICATIONS:  IV Contrast: NONE  Oral Contrast: NONE  Complications: None reported at time of study completion    PROCEDURE:  CT of the Chest, Abdomen and Pelvis was performed.  Sagittal and coronal reformats were performed.    FINDINGS:  CHEST:  LUNGS AND LARGE AIRWAYS: Patent central airways. Scarring in the left upper lobe and superior segment of the left lower lobe. Mild scarring at the right lung base. Focal consolidation or discrete mass seen. Sutures in the right infrahilar region. Correlate with patient's surgical history.  PLEURA: No pleural effusion.  VESSELS: Within normal limits.  HEART: Heart size is normal. No pericardial effusion.  MEDIASTINUM AND AGUILAR:No lymphadenopathy.  CHEST WALL AND LOWER NECK: Within normal limits.    ABDOMEN AND PELVIS:  LIVER: Small hepatic cyst in segment 2 and segment 5.  BILE DUCTS: Normal caliber.  GALLBLADDER: Within normal limits.  SPLEEN: Within normal limits.  PANCREAS: Within normal limits.  ADRENALS: Within normal limits.  KIDNEYS/URETERS: 6 mm nonobstructing stone in the upper pole of the right kidney. Left renal cysts which are grossly stable. Punctate nonobstructing stone in the lower pole the left kidney.    BLADDER: Within normal limits.  REPRODUCTIVE ORGANS: Prominent prostate gland.    BOWEL: No bowel obstruction. Appendix within normal limits.  PERITONEUM: No ascites.  VESSELS: Within normal limits.  RETROPERITONEUM/LYMPH NODES: No lymphadenopathy.  ABDOMINAL WALL: Small umbilical hernia containing fat.  BONES: Degenerative changes of bone. Old right seventh and eighth rib fractures.    IMPRESSION:  No acute pathology visualized.                DIONY HOOD MD; Attending Radiologist  This document has been electronically signed. Jul  3 2021 11:04AM    < end of copied text >

## 2021-07-04 NOTE — PROGRESS NOTE ADULT - SUBJECTIVE AND OBJECTIVE BOX
Patient is a 76y old  Male who presents with a chief complaint of fever (2021 11:19)      INTERVAL HPI/OVERNIGHT EVENTS:    no overnight events.  This morning feeling pain on right thigh.  denies any SOB or CP.     MEDICATIONS  (STANDING):  atorvastatin 10 milliGRAM(s) Oral at bedtime  budesonide 160 MICROgram(s)/formoterol 4.5 MICROgram(s) Inhaler 2 Puff(s) Inhalation two times a day  diltiazem    milliGRAM(s) Oral at bedtime  enoxaparin Injectable 40 milliGRAM(s) SubCutaneous daily  lactated ringers. 1000 milliLiter(s) (75 mL/Hr) IV Continuous <Continuous>  montelukast 10 milliGRAM(s) Oral at bedtime  multivitamin 1 Tablet(s) Oral daily  piperacillin/tazobactam IVPB.. 3.375 Gram(s) IV Intermittent every 8 hours  potassium chloride    Tablet ER 40 milliEquivalent(s) Oral every 4 hours    MEDICATIONS  (PRN):      Allergies    Cherries: Cough (Other)  dust (Sneezing)  No Known Drug Allergies  shellfish (Other)  strawberry (Other)    Intolerances        Vital Signs Last 24 Hrs  T(C): 36.9 (2021 10:35), Max: 36.9 (2021 10:35)  T(F): 98.5 (2021 10:35), Max: 98.5 (2021 10:35)  HR: 72 (2021 10:35) (72 - 88)  BP: 104/65 (2021 10:35) (101/65 - 122/74)  BP(mean): --  RR: 18 (2021 10:35) (16 - 18)  SpO2: 95% (2021 10:35) (95% - 96%)    PHYSICAL EXAM:  GENERAL: NAD  HEAD:  Atraumatic, Normocephalic  EYES: EOMI, PERRLA, conjunctiva and sclera clear  ENMT: Moist mucous membranes, No lesions; No tonsillar erythema, exudates, or enlargement  NECK: Supple, No JVD, Normal thyroid  NERVOUS SYSTEM:  Alert & Oriented X3, Good concentration; All 4 extremities mobile, no gross sensory deficits.   CHEST/LUNG: Clear to auscultation bilaterally; No rales, rhonchi, wheezing, or rubs  HEART: Regular rate and rhythm; No murmurs, rubs, or gallops  ABDOMEN: Soft, Nontender, Nondistended; Bowel sounds present  EXTREMITIES:  2+ Peripheral Pulses, No clubbing, cyanosis, or edema  LYMPH: No lymphadenopathy noted  SKIN: No rashes or lesions    LABS:                        13.7   9.43  )-----------( 153      ( 2021 11:15 )             42.2     2021 11:15    143    |  106    |  12     ----------------------------<  103    3.4     |  28     |  0.88     Ca    8.5        2021 11:15    TPro  7.5    /  Alb  2.8    /  TBili  0.8    /  DBili  x      /  AST  25     /  ALT  36     /  AlkPhos  54     2021 11:15    PT/INR - ( 2021 20:23 )   PT: 13.1 sec;   INR: 1.09 ratio         PTT - ( 2021 20:23 )  PTT:28.5 sec  Urinalysis Basic - ( 2021 20:57 )    Color: Yellow / Appearance: Clear / S.020 / pH: x  Gluc: x / Ketone: Negative  / Bili: Negative / Urobili: Negative mg/dL   Blood: x / Protein: 100 mg/dL / Nitrite: Negative   Leuk Esterase: Negative / RBC: 3-5 /HPF / WBC 3-5   Sq Epi: x / Non Sq Epi: Negative / Bacteria: Occasional      CAPILLARY BLOOD GLUCOSE          RADIOLOGY & ADDITIONAL TESTS:    Imaging Personally Reviewed:  [ ] YES     Consultant(s) Notes Reviewed:      Care Discussed with Consultants/Other Providers:    Advanced Directives: [ ] DNR  [ ] No feeding tube  [ ] MOLST in chart  [ ] MOLST completed today  [ ] Unknown

## 2021-07-05 LAB
ALBUMIN SERPL ELPH-MCNC: 2.9 G/DL — LOW (ref 3.3–5)
ALP SERPL-CCNC: 58 U/L — SIGNIFICANT CHANGE UP (ref 30–120)
ALT FLD-CCNC: 33 U/L DA — SIGNIFICANT CHANGE UP (ref 10–60)
ANION GAP SERPL CALC-SCNC: 9 MMOL/L — SIGNIFICANT CHANGE UP (ref 5–17)
AST SERPL-CCNC: 16 U/L — SIGNIFICANT CHANGE UP (ref 10–40)
BILIRUB SERPL-MCNC: 0.4 MG/DL — SIGNIFICANT CHANGE UP (ref 0.2–1.2)
BUN SERPL-MCNC: 15 MG/DL — SIGNIFICANT CHANGE UP (ref 7–23)
CALCIUM SERPL-MCNC: 8.7 MG/DL — SIGNIFICANT CHANGE UP (ref 8.4–10.5)
CHLORIDE SERPL-SCNC: 106 MMOL/L — SIGNIFICANT CHANGE UP (ref 96–108)
CO2 SERPL-SCNC: 26 MMOL/L — SIGNIFICANT CHANGE UP (ref 22–31)
CREAT SERPL-MCNC: 0.91 MG/DL — SIGNIFICANT CHANGE UP (ref 0.5–1.3)
GLUCOSE SERPL-MCNC: 122 MG/DL — HIGH (ref 70–99)
HCT VFR BLD CALC: 43.2 % — SIGNIFICANT CHANGE UP (ref 39–50)
HGB BLD-MCNC: 13.9 G/DL — SIGNIFICANT CHANGE UP (ref 13–17)
MCHC RBC-ENTMCNC: 29.3 PG — SIGNIFICANT CHANGE UP (ref 27–34)
MCHC RBC-ENTMCNC: 32.2 GM/DL — SIGNIFICANT CHANGE UP (ref 32–36)
MCV RBC AUTO: 90.9 FL — SIGNIFICANT CHANGE UP (ref 80–100)
NRBC # BLD: 0 /100 WBCS — SIGNIFICANT CHANGE UP (ref 0–0)
PLATELET # BLD AUTO: 170 K/UL — SIGNIFICANT CHANGE UP (ref 150–400)
POTASSIUM SERPL-MCNC: 3.9 MMOL/L — SIGNIFICANT CHANGE UP (ref 3.5–5.3)
POTASSIUM SERPL-SCNC: 3.9 MMOL/L — SIGNIFICANT CHANGE UP (ref 3.5–5.3)
PROT SERPL-MCNC: 7.9 G/DL — SIGNIFICANT CHANGE UP (ref 6–8.3)
RBC # BLD: 4.75 M/UL — SIGNIFICANT CHANGE UP (ref 4.2–5.8)
RBC # FLD: 14.3 % — SIGNIFICANT CHANGE UP (ref 10.3–14.5)
SODIUM SERPL-SCNC: 141 MMOL/L — SIGNIFICANT CHANGE UP (ref 135–145)
WBC # BLD: 8.42 K/UL — SIGNIFICANT CHANGE UP (ref 3.8–10.5)
WBC # FLD AUTO: 8.42 K/UL — SIGNIFICANT CHANGE UP (ref 3.8–10.5)

## 2021-07-05 PROCEDURE — 99233 SBSQ HOSP IP/OBS HIGH 50: CPT

## 2021-07-05 RX ADMIN — ATORVASTATIN CALCIUM 10 MILLIGRAM(S): 80 TABLET, FILM COATED ORAL at 21:33

## 2021-07-05 RX ADMIN — MONTELUKAST 10 MILLIGRAM(S): 4 TABLET, CHEWABLE ORAL at 21:33

## 2021-07-05 RX ADMIN — ENOXAPARIN SODIUM 40 MILLIGRAM(S): 100 INJECTION SUBCUTANEOUS at 11:24

## 2021-07-05 RX ADMIN — PIPERACILLIN AND TAZOBACTAM 25 GRAM(S): 4; .5 INJECTION, POWDER, LYOPHILIZED, FOR SOLUTION INTRAVENOUS at 13:22

## 2021-07-05 RX ADMIN — BUDESONIDE AND FORMOTEROL FUMARATE DIHYDRATE 2 PUFF(S): 160; 4.5 AEROSOL RESPIRATORY (INHALATION) at 17:13

## 2021-07-05 RX ADMIN — PIPERACILLIN AND TAZOBACTAM 25 GRAM(S): 4; .5 INJECTION, POWDER, LYOPHILIZED, FOR SOLUTION INTRAVENOUS at 21:32

## 2021-07-05 RX ADMIN — Medication 1 TABLET(S): at 11:24

## 2021-07-05 RX ADMIN — Medication 120 MILLIGRAM(S): at 21:34

## 2021-07-05 RX ADMIN — PIPERACILLIN AND TAZOBACTAM 25 GRAM(S): 4; .5 INJECTION, POWDER, LYOPHILIZED, FOR SOLUTION INTRAVENOUS at 05:49

## 2021-07-05 RX ADMIN — BUDESONIDE AND FORMOTEROL FUMARATE DIHYDRATE 2 PUFF(S): 160; 4.5 AEROSOL RESPIRATORY (INHALATION) at 05:48

## 2021-07-05 NOTE — PROGRESS NOTE ADULT - SUBJECTIVE AND OBJECTIVE BOX
[facility], Division of Infectious Diseases  PABLITO Frazier Y. Patel, S. Shah  [phone]    Name: ANN BARRIENTOS  Age: 76y  Gender: Male  MRN: [id number]    Interval History:  Patient seen and examined at bedside this morning  No acute overnight events. Afebrile  Eating breakfast. thigh pain resolved  Notes reviewed    Antibiotics:  piperacillin/tazobactam IVPB.. 3.375 Gram(s) IV Intermittent every 8 hours      Medications:  atorvastatin 10 milliGRAM(s) Oral at bedtime  budesonide 160 MICROgram(s)/formoterol 4.5 MICROgram(s) Inhaler 2 Puff(s) Inhalation two times a day  diltiazem    milliGRAM(s) Oral at bedtime  enoxaparin Injectable 40 milliGRAM(s) SubCutaneous daily  lactated ringers. 1000 milliLiter(s) IV Continuous <Continuous>  montelukast 10 milliGRAM(s) Oral at bedtime  multivitamin 1 Tablet(s) Oral daily  piperacillin/tazobactam IVPB.. 3.375 Gram(s) IV Intermittent every 8 hours      Review of Systems:  A 10-point review of systems was obtained.     Pertinent positives and negatives--  Constitutional: No fevers. No Chills. No Rigors.   Cardiovascular: No chest pain. No palpitations.  Respiratory: No shortness of breath. No cough.  Gastrointestinal: No nausea or vomiting. No diarrhea or constipation.   Psychiatric: Pleasant. Appropriate affect.    Review of systems otherwise negative except as previously noted.    Allergies: Cherries: Cough (Other)  dust (Sneezing)  No Known Drug Allergies  shellfish (Other)  strawberry (Other)    For details regarding the patient's past medical history, social history, family history, and other miscellaneous elements, please refer the initial infectious diseases consultation and/or the admitting history and physical examination for this admission.    Objective:  Vitals:   T(C): 36.3 (07-05-21 @ 05:42), Max: 36.9 (07-04-21 @ 10:35)  HR: 66 (07-05-21 @ 05:42) (66 - 81)  BP: 122/81 (07-05-21 @ 05:42) (104/65 - 131/83)  RR: 18 (07-05-21 @ 05:42) (18 - 18)  SpO2: 98% (07-05-21 @ 05:42) (94% - 100%)    Physical Examination:  General: no acute distress  HEENT: NC/AT, EOMI, anicteric, no oral lesions  Neck: supple, no palpable LAD  Cardio: S1, S2 heard, RRR, no murmurs  Resp: decreased b/l breath sounds  Abd: soft, NT, ND, + bowel sounds  Neuro: no obvious focal deficits  Ext: no edema or cyanosis  Skin: warm, dry, no visible rash      Laboratory Studies:  CBC:                       13.9   8.42  )-----------( 170      ( 05 Jul 2021 07:26 )             43.2     CMP: 07-05    141  |  106  |  15  ----------------------------<  122<H>  3.9   |  26  |  0.91    Ca    8.7      05 Jul 2021 07:26    TPro  7.9  /  Alb  2.9<L>  /  TBili  0.4  /  DBili  x   /  AST  16  /  ALT  33  /  AlkPhos  58  07-05    LIVER FUNCTIONS - ( 05 Jul 2021 07:26 )  Alb: 2.9 g/dL / Pro: 7.9 g/dL / ALK PHOS: 58 U/L / ALT: 33 U/L DA / AST: 16 U/L / GGT: x               Microbiology: reviewed    Culture - Urine (collected 07-03-21 @ 13:04)  Source: .Urine Clean Catch (Midstream)  Final Report (07-04-21 @ 11:17):    No growth    Culture - Blood (collected 07-03-21 @ 13:04)  Source: .Blood Blood-Peripheral  Preliminary Report (07-04-21 @ 14:01):    No growth to date.    Culture - Blood (collected 07-03-21 @ 13:04)  Source: .Blood Blood-Peripheral  Preliminary Report (07-04-21 @ 14:01):    No growth to date.        Radiology: reviewed  < from: US Duplex Venous Lower Ext Complete, Bilateral (07.04.21 @ 15:02) >    EXAM:  US DPLX LWR EXT VEINS COMPL BI                                  PROCEDURE DATE:  07/04/2021          INTERPRETATION:  CLINICAL INFORMATION: Leg pain.    COMPARISON: None available.    TECHNIQUE: Duplex sonography of the BILATERAL LOWER extremity veins with color and spectral Doppler, with and without compression.    FINDINGS:    RIGHT:  Normal compressibility of the RIGHT common femoral, femoral and popliteal veins.  Doppler examination shows normal spontaneous and phasic flow.  No RIGHTcalf vein thrombosis is detected.    LEFT:  Normal compressibility of the LEFT common femoral, femoral and popliteal veins.  Doppler examination shows normal spontaneous and phasic flow.  No LEFT calf vein thrombosis is detected.    IMPRESSION:  No evidence of deep venous thrombosis in either lower extremity.                  AGAPITO SAUNDERS MD; Attending Radiologist  This document has been electronically signed. Jul  4 2021  3:12PM    < end of copied text >

## 2021-07-05 NOTE — PROGRESS NOTE ADULT - SUBJECTIVE AND OBJECTIVE BOX
Patient is a 76y old  Male who presents with a chief complaint of fever (05 Jul 2021 08:45)      INTERVAL HPI/OVERNIGHT EVENTS:    no overnight events    MEDICATIONS  (STANDING):  atorvastatin 10 milliGRAM(s) Oral at bedtime  budesonide 160 MICROgram(s)/formoterol 4.5 MICROgram(s) Inhaler 2 Puff(s) Inhalation two times a day  diltiazem    milliGRAM(s) Oral at bedtime  enoxaparin Injectable 40 milliGRAM(s) SubCutaneous daily  lactated ringers. 1000 milliLiter(s) (75 mL/Hr) IV Continuous <Continuous>  montelukast 10 milliGRAM(s) Oral at bedtime  multivitamin 1 Tablet(s) Oral daily  piperacillin/tazobactam IVPB.. 3.375 Gram(s) IV Intermittent every 8 hours    MEDICATIONS  (PRN):      Allergies    Cherries: Cough (Other)  dust (Sneezing)  No Known Drug Allergies  shellfish (Other)  strawberry (Other)    Intolerances      Vital Signs Last 24 Hrs  T(C): 36.3 (05 Jul 2021 05:42), Max: 36.9 (04 Jul 2021 10:35)  T(F): 97.4 (05 Jul 2021 05:42), Max: 98.5 (04 Jul 2021 10:35)  HR: 66 (05 Jul 2021 05:42) (66 - 81)  BP: 122/81 (05 Jul 2021 05:42) (104/65 - 131/83)  BP(mean): --  RR: 18 (05 Jul 2021 05:42) (18 - 18)  SpO2: 98% (05 Jul 2021 05:42) (94% - 100%)    PHYSICAL EXAM:  GENERAL: NAD  HEAD:  Atraumatic, Normocephalic  EYES: EOMI, PERRLA, conjunctiva and sclera clear  ENMT: Moist mucous membranes, No lesions; No tonsillar erythema, exudates, or enlargement  NECK: Supple, No JVD, Normal thyroid  NERVOUS SYSTEM:  Alert & Oriented X3, Good concentration; All 4 extremities mobile, no gross sensory deficits.   CHEST/LUNG: Clear to auscultation bilaterally; No rales, rhonchi, wheezing, or rubs  HEART: Regular rate and rhythm; No murmurs, rubs, or gallops  ABDOMEN: Soft, Nontender, Nondistended; Bowel sounds present  EXTREMITIES:  2+ Peripheral Pulses, No clubbing, cyanosis, or edema  LYMPH: No lymphadenopathy noted  SKIN: No rashes or lesions    LABS:                        13.9   8.42  )-----------( 170      ( 05 Jul 2021 07:26 )             43.2     05 Jul 2021 07:26    141    |  106    |  15     ----------------------------<  122    3.9     |  26     |  0.91     Ca    8.7        05 Jul 2021 07:26    TPro  7.9    /  Alb  2.9    /  TBili  0.4    /  DBili  x      /  AST  16     /  ALT  33     /  AlkPhos  58     05 Jul 2021 07:26        CAPILLARY BLOOD GLUCOSE          RADIOLOGY & ADDITIONAL TESTS:    Imaging Personally Reviewed:  [ ] YES     Consultant(s) Notes Reviewed:      Care Discussed with Consultants/Other Providers:    Advanced Directives: [ ] DNR  [ ] No feeding tube  [ ] MOLST in chart  [ ] MOLST completed today  [ ] Unknown

## 2021-07-05 NOTE — PROGRESS NOTE ADULT - SUBJECTIVE AND OBJECTIVE BOX
Chief Complaint: Fatigue, weakness    Interval Events: No events overnight.    Review of Systems:  General: No fevers, chills, weight loss or gain  Skin: No rashes, color changes  Cardiovascular: No chest pain, orthopnea  Respiratory: No shortness of breath, cough  Gastrointestinal: No nausea, abdominal pain  Genitourinary: No incontinence, pain with urination  Musculoskeletal: No pain, swelling, decreased range of motion  Neurological: No headache, weakness  Psychiatric: No depression, anxiety  Endocrine: No weight loss or gain, increased thirst  All other systems are comprehensively negative.    Physical Exam:  Vitals:        Vital Signs Last 24 Hrs  T(C): 36.3 (05 Jul 2021 05:42), Max: 36.9 (04 Jul 2021 10:35)  T(F): 97.4 (05 Jul 2021 05:42), Max: 98.5 (04 Jul 2021 10:35)  HR: 66 (05 Jul 2021 05:42) (66 - 81)  BP: 122/81 (05 Jul 2021 05:42) (104/65 - 131/83)  BP(mean): --  RR: 18 (05 Jul 2021 05:42) (18 - 18)  SpO2: 98% (05 Jul 2021 05:42) (94% - 100%)  General: NAD  HEENT: MMM  Neck: No JVD, no carotid bruit  Lungs: CTAB  CV: RRR, nl S1/S2, no M/R/G  Abdomen: S/NT/ND, +BS  Extremities: No LE edema, no cyanosis  Neuro: AAOx3, non-focal  Skin: No rash    Labs:                        13.9   8.42  )-----------( 170      ( 05 Jul 2021 07:26 )             43.2     07-05    141  |  106  |  15  ----------------------------<  122<H>  3.9   |  26  |  0.91    Ca    8.7      05 Jul 2021 07:26    TPro  7.9  /  Alb  2.9<L>  /  TBili  0.4  /  DBili  x   /  AST  16  /  ALT  33  /  AlkPhos  58  07-05            Telemetry: Sinus rhythm

## 2021-07-06 ENCOUNTER — TRANSCRIPTION ENCOUNTER (OUTPATIENT)
Age: 76
End: 2021-07-06

## 2021-07-06 VITALS
TEMPERATURE: 98 F | RESPIRATION RATE: 18 BRPM | OXYGEN SATURATION: 96 % | DIASTOLIC BLOOD PRESSURE: 64 MMHG | HEART RATE: 80 BPM | SYSTOLIC BLOOD PRESSURE: 105 MMHG

## 2021-07-06 PROCEDURE — 87040 BLOOD CULTURE FOR BACTERIA: CPT

## 2021-07-06 PROCEDURE — 85730 THROMBOPLASTIN TIME PARTIAL: CPT

## 2021-07-06 PROCEDURE — 94640 AIRWAY INHALATION TREATMENT: CPT

## 2021-07-06 PROCEDURE — 96361 HYDRATE IV INFUSION ADD-ON: CPT

## 2021-07-06 PROCEDURE — 85025 COMPLETE CBC W/AUTO DIFF WBC: CPT

## 2021-07-06 PROCEDURE — 94760 N-INVAS EAR/PLS OXIMETRY 1: CPT

## 2021-07-06 PROCEDURE — 97161 PT EVAL LOW COMPLEX 20 MIN: CPT

## 2021-07-06 PROCEDURE — 93306 TTE W/DOPPLER COMPLETE: CPT

## 2021-07-06 PROCEDURE — 96374 THER/PROPH/DIAG INJ IV PUSH: CPT

## 2021-07-06 PROCEDURE — 86769 SARS-COV-2 COVID-19 ANTIBODY: CPT

## 2021-07-06 PROCEDURE — 85027 COMPLETE CBC AUTOMATED: CPT

## 2021-07-06 PROCEDURE — 86803 HEPATITIS C AB TEST: CPT

## 2021-07-06 PROCEDURE — 99239 HOSP IP/OBS DSCHRG MGMT >30: CPT

## 2021-07-06 PROCEDURE — 36415 COLL VENOUS BLD VENIPUNCTURE: CPT

## 2021-07-06 PROCEDURE — 87086 URINE CULTURE/COLONY COUNT: CPT

## 2021-07-06 PROCEDURE — 85610 PROTHROMBIN TIME: CPT

## 2021-07-06 PROCEDURE — 71045 X-RAY EXAM CHEST 1 VIEW: CPT

## 2021-07-06 PROCEDURE — 93970 EXTREMITY STUDY: CPT

## 2021-07-06 PROCEDURE — 99285 EMERGENCY DEPT VISIT HI MDM: CPT

## 2021-07-06 PROCEDURE — 71250 CT THORAX DX C-: CPT

## 2021-07-06 PROCEDURE — 83605 ASSAY OF LACTIC ACID: CPT

## 2021-07-06 PROCEDURE — 80053 COMPREHEN METABOLIC PANEL: CPT

## 2021-07-06 PROCEDURE — 84145 PROCALCITONIN (PCT): CPT

## 2021-07-06 PROCEDURE — 83735 ASSAY OF MAGNESIUM: CPT

## 2021-07-06 PROCEDURE — 74176 CT ABD & PELVIS W/O CONTRAST: CPT

## 2021-07-06 PROCEDURE — 84100 ASSAY OF PHOSPHORUS: CPT

## 2021-07-06 PROCEDURE — 93005 ELECTROCARDIOGRAM TRACING: CPT

## 2021-07-06 PROCEDURE — 94660 CPAP INITIATION&MGMT: CPT

## 2021-07-06 PROCEDURE — 0225U NFCT DS DNA&RNA 21 SARSCOV2: CPT

## 2021-07-06 PROCEDURE — 81001 URINALYSIS AUTO W/SCOPE: CPT

## 2021-07-06 RX ADMIN — PIPERACILLIN AND TAZOBACTAM 25 GRAM(S): 4; .5 INJECTION, POWDER, LYOPHILIZED, FOR SOLUTION INTRAVENOUS at 05:55

## 2021-07-06 RX ADMIN — Medication 1 TABLET(S): at 12:06

## 2021-07-06 RX ADMIN — BUDESONIDE AND FORMOTEROL FUMARATE DIHYDRATE 2 PUFF(S): 160; 4.5 AEROSOL RESPIRATORY (INHALATION) at 12:06

## 2021-07-06 RX ADMIN — ENOXAPARIN SODIUM 40 MILLIGRAM(S): 100 INJECTION SUBCUTANEOUS at 12:06

## 2021-07-06 NOTE — PROGRESS NOTE ADULT - PROVIDER SPECIALTY LIST ADULT
Cardiology
Hospitalist
Cardiology
Cardiology
Hospitalist
Infectious Disease
Hospitalist

## 2021-07-06 NOTE — PHYSICAL THERAPY INITIAL EVALUATION ADULT - PERTINENT HX OF CURRENT PROBLEM, REHAB EVAL
pt is a 77 y/o male, admitted from home with fatigue, chills, and nausea. Admitted for possible sepsis.

## 2021-07-06 NOTE — DISCHARGE NOTE NURSING/CASE MANAGEMENT/SOCIAL WORK - PATIENT PORTAL LINK FT
You can access the FollowMyHealth Patient Portal offered by [facility] by registering at the following website: [email or web address]. By joining [employer]’s FollowMyHealth portal, you will also be able to view your health information using other applications (apps) compatible with our system.

## 2021-07-06 NOTE — DISCHARGE NOTE PROVIDER - CARE PROVIDER_API CALL
Alvaro Chaudhry)  Cardiovascular Disease; Internal Medicine  [address]  [place] [address]  Phone: [phone]  Fax: [phone]  Follow Up Time:

## 2021-07-06 NOTE — PROGRESS NOTE ADULT - ASSESSMENT
Pt is a 76M w/ PMHx of lung CA s/p RLL resection in 2018, atrial flutter s/p loop recorder on diltiazem but no blood thinners, BPH, DALIA on CPAP, fatty liver/cirrhosis, HLD, recent H.pylori infection (treated for 2 weeks, finished a week ago), kidney and gallstones who presents with chills.    In the ED, patient's triage vitals were /76    RR 20, 96%  T98.2F (eventually climbed to 104.3F).  Patient's labs showed leukocytosis of 16.4 with a left shift and a lactate of 2.6 (went down to 1.3).  Patient was empirically started on vancomycin and zosyn and given 2.2L NS bolus.   ID c/s for further evaluation of sepsis.    Sepsis 2/2 unclear etiology--?pulmonary given SOB/cough  Sepsis resolved  -infectious w/u reviewed--BCx/UCx NGTD  -imaging reviewed. CXR and CT C/A/P negative for foci of infection. DVT study negative  -trend temps/WBC  -supportive care  -c/w zosyn.  to complete total 5 day course until 7/6; last day today  
The patient is a 76 year old male with a history of HL, BPH, lung cancer s/p resection, atrial flutter, ILR who presents with fevers in the setting of sepsis of unclear etiology.    Plan:  - Continue diltiazem  mg daily  - Atrial flutter - ?brief in hospital. Currently in sinus rhythm. Will defer use of full dose anticoagulation at the current time. If he has a sustained recurrence, then can be started on apixaban at that time.  - Echo with normal LV systolic function, moderate AR, mild pulm HTN  - On zosyn  - ID follow-up
76M with hx of lung CA s/p RLL resection in 2018, atrial flutter s/p loop recorder on diltiazem but no blood thinners, BPH, DALIA on CPAP, fatty liver/cirrhosis, recent H.pylori infection (treated for 2 weeks, finished a week ago), kidney and gallstones who presents with chills and fevers.      Problems:  1.  Sepsis from unknown source   2.  Atrial flutter  3.  DALIA on CPAP  4.  Lung CA  5.  HLD  6.  BPH    Sepsis from unknown source - met sepsis criteria with fevers + tachycardia + leukocytosis + suspected source of infection (though unidentified at the moment)  - admit to medicine  - f/u CT C/A/P to evaluate for a source  - for now, will continue zosyn 3.375gms q8h empirically  - dc vancomycin since no MRSA risk  - f/u blood and urine cultures  - check procalcitonin  - ID Dr. Burkett [facility]    Hypokalemia  - supplement, repeat in AM.     Atrial flutter - currently in sinus, not on any anticoagulation  - has a [id number]-CKVf score of 2 (based on age and sex) -> technically meets AC criteria but is not on any  - defer starting AC for now  - continue with diltiazem CD 120mg daily  - remote telemetry  - Dr. Chaudhry consulted    DALIA on CPAP  - will be on remote telemetry  - continue with CPAP with 4 cmH20    Lung CA  - continue with singular  - continue with breo (patient to take his own)    HLD   - continue with atorvastatin 10mg qHS    BPH  - continue home BPH med    Preventive measures  - continue lovenox 40mg subq for DVT ppx  
Pt is a 76M w/ PMHx of lung CA s/p RLL resection in 2018, atrial flutter s/p loop recorder on diltiazem but no blood thinners, BPH, DALIA on CPAP, fatty liver/cirrhosis, HLD, recent H.pylori infection (treated for 2 weeks, finished a week ago), kidney and gallstones who presents with chills.    In the ED, patient's triage vitals were /76    RR 20, 96%  T98.2F (eventually climbed to 104.3F).  Patient's labs showed leukocytosis of 16.4 with a left shift and a lactate of 2.6 (went down to 1.3).  Patient was empirically started on vancomycin and zosyn and given 2.2L NS bolus.   ID c/s for further evaluation of sepsis.    Sepsis 2/2 unclear etiology--?pulmonary given SOB/cough  Sepsis resolved  -infectious w/u reviewed--BCx/UCx NGTD  -imaging reviewed. CXR and CT C/A/P negative for foci of infection. DVT study negative  -trend temps/WBC  -supportive care  -c/w zosyn. Will treat presumed pulmonary source given pt's presentation.  When ready for d/c can switch to cefuroxime 500mg BID to complete total 5 day course until 7/6; last day tomorrow    Infectious Diseases will continue to follow. Please call with any questions.   Tatiana Rogers M.D.  [facility], Division of Infectious Diseases [phone]  For over the weekend and after hours, please call [phone]  
76M with hx of lung CA s/p RLL resection in 2018, atrial flutter s/p loop recorder on diltiazem but no blood thinners, BPH, DALIA on CPAP, fatty liver/cirrhosis, recent H.pylori infection (treated for 2 weeks, finished a week ago), kidney and gallstones who presents with chills and fevers.      Problems:  1.  Sepsis from unknown source   2.  Atrial flutter  3.  DALIA on CPAP  4.  Lung CA  5.  HLD  6.  BPH    Sepsis from unknown source - met sepsis criteria with fevers + tachycardia + leukocytosis + suspected source of infection (though unidentified at the moment)  - admit to medicine  - f/u CT C/A/P to evaluate for a source  - for now, will continue zosyn 3.375gms q8h empirically  - dc vancomycin since no MRSA risk  - f/u blood and urine cultures  - check procalcitonin  - ID Dr. Burkett [facility]    Hypokalemia  - supplement, repeat in AM.     Atrial flutter - currently in sinus, not on any anticoagulation  - has a [id number] score of 2 (based on age and sex) -> technically meets AC criteria but is not on any  - defer starting AC for now  - continue with diltiazem CD 120mg daily  - remote telemetry  - Dr. Chaudhry consulted, recs appreciated  - Eliquis should be started until patient can follow-up with Dr. Ritchie and have the implantable Loop recorder interrogated    DALIA on CPAP  - will be on remote telemetry  - continue with CPAP with 4 cmH20    Lung CA  - continue with singular  - continue with breo (patient to take his own)    HLD   - continue with atorvastatin 10mg qHS    BPH  - continue home BPH med    Preventive measures  - continue lovenox 40mg subq for DVT ppx  
75y/o seen at [facility] telemetry  History high cholesterol, A-Flutter, kidney stones, gall-stones, BPH, ?sleep apnea, fatty liver  2018 s/p right lower lobe resection for lung cancer  S/P implantable loop recorder by Dr. Ritchie at [facility] about 2 years ago  S/P prostate surgery    Admitted for fatigue, weakness and chills  In ER temperature spike to 104.3   WBC in ER-16.39  Telemetry strip about 12:36 am with possible A-Flutter with slow 2:1 block  Presently monitor-NSR  No cardiac complaints at present  Potassium-3.4    7/4/21  Patient lying flat, comfortably  Awake and alert  No complaints  Monitor-NSR    Impression  Possible sepsis  Paroxysmal A-Flutter    Plan:  - Continue present therapy  - Continue Diltiazem  - Patient on Zosyn  - Echocardiogram-see above report  - Eliquis should be started until patient can follow-up with Dr. Ritchie and have the implantable Loop recorder interrogated  - Potassium supps and follow labs
The patient is a 76 year old male with a history of HL, BPH, lung cancer s/p resection, atrial flutter, ILR who presents with fevers in the setting of sepsis of unclear etiology.    Plan:  - Continue diltiazem  mg daily  - Atrial flutter - ?brief in hospital. Currently in sinus rhythm. Will defer use of full dose anticoagulation at the current time. If he has a sustained recurrence, then can be started on apixaban at that time.  - Echo with normal LV systolic function, moderate AR, mild pulm HTN  - On zosyn  - ID follow-up
Pt is a 76M w/ PMHx of lung CA s/p RLL resection in 2018, atrial flutter s/p loop recorder on diltiazem but no blood thinners, BPH, DALIA on CPAP, fatty liver/cirrhosis, HLD, recent H.pylori infection (treated for 2 weeks, finished a week ago), kidney and gallstones who presents with chills.    In the ED, patient's triage vitals were /76    RR 20, 96%  T98.2F (eventually climbed to 104.3F).  Patient's labs showed leukocytosis of 16.4 with a left shift and a lactate of 2.6 (went down to 1.3).  Patient was empirically started on vancomycin and zosyn and given 2.2L NS bolus.   ID c/s for further evaluation of sepsis.    Sepsis 2/2 unclear etiology--?pulmonary given SOB/cough  Sepsis resolved  -infectious w/u reviewed--BCx/UCx NGTD  -imaging reviewed. CXR and CT C/A/P negative for foci of infection  -trend temps/WBC  -supportive care  -c/w zosyn. Will treat presumed pulmonary source given pt's presentation.  When ready for d/c can switch to cefuroxime 500mg BID to complete total 5 day course until 7/6    Infectious Diseases will continue to follow. Please call with any questions.   Tatiana Rogers M.D.  [facility], Division of Infectious Diseases [phone]  For over the weekend and after hours, please call [phone]  
76M with hx of lung CA s/p RLL resection in 2018, atrial flutter s/p loop recorder on diltiazem but no blood thinners, BPH, DALIA on CPAP, fatty liver/cirrhosis, recent H.pylori infection (treated for 2 weeks, finished a week ago), kidney and gallstones who presents with chills and fevers.      Problems:  1.  Sepsis from unknown source   2.  Atrial flutter  3.  DALIA on CPAP  4.  Lung CA  5.  HLD  6.  BPH    Sepsis from unknown source - met sepsis criteria with fevers + tachycardia + leukocytosis + suspected source of infection (though unidentified at the moment)  - admit to medicine  - f/u CT C/A/P to evaluate for a source  - for now, will continue zosyn 3.375gms q8h empirically  - dc vancomycin since no MRSA risk  - f/u blood and urine cultures  - check procalcitonin  - ID Dr. Simpson [facility] health recs appreciated  - last day of abx tomorrow.  - PT eval ordered, possible DC today or tomorrow.       Hypokalemia  - resolved     Atrial flutter - currently in sinus, not on any anticoagulation  - has a [id number] score of 2 (based on age and sex) -> technically meets AC criteria but is not on any  - defer starting AC for now  - continue with diltiazem CD 120mg daily  - remote telemetry  - Dr. Chaudhry consulted, recs appreciated  - Eliquis should be started until patient can follow-up with Dr. Ritchie and have the implantable Loop recorder interrogated    DALIA on CPAP  - will be on remote telemetry  - continue with CPAP with 4 cmH20    Lung CA  - continue with singular  - continue with breo (patient to take his own)    HLD   - continue with atorvastatin 10mg qHS    BPH  - continue home BPH med    Preventive measures  - continue lovenox 40mg subq for DVT ppx

## 2021-07-06 NOTE — DISCHARGE NOTE PROVIDER - NSDCMRMEDTOKEN_GEN_ALL_CORE_FT
Advair Diskus 250 mcg-50 mcg inhalation powder: 1 puff(s) inhaled 2 times a day  atorvastatin 10 mg oral tablet: 1 tab(s) orally once a day, pm  DilTIAZem Hydrochloride  mg/24 hours oral capsule, extended release: 1 cap(s) orally once a day  fluticasone 50 mcg inhalation powder: 1 puff(s) inhaled 2 times a day  loratadine 10 mg oral tablet: 1 tab(s) orally once a day, pm  multivitamin: 1 tab(s) orally once a day,   Omega 3 acid: 1  orally 2 times a day,   Vitamin D 3: 1000 unit(s) orally once a day,

## 2021-07-06 NOTE — PROGRESS NOTE ADULT - SUBJECTIVE AND OBJECTIVE BOX
Chief Complaint: Fatigue, weakness    Interval Events: No events overnight.    Review of Systems:  General: No fevers, chills, weight loss or gain  Skin: No rashes, color changes  Cardiovascular: No chest pain, orthopnea  Respiratory: No shortness of breath, cough  Gastrointestinal: No nausea, abdominal pain  Genitourinary: No incontinence, pain with urination  Musculoskeletal: No pain, swelling, decreased range of motion  Neurological: No headache, weakness  Psychiatric: No depression, anxiety  Endocrine: No weight loss or gain, increased thirst  All other systems are comprehensively negative.    Physical Exam:  Vital Signs Last 24 Hrs  T(C): 36.6 (06 Jul 2021 09:35), Max: 36.8 (05 Jul 2021 13:32)  T(F): 97.9 (06 Jul 2021 09:35), Max: 98.2 (05 Jul 2021 13:32)  HR: 74 (06 Jul 2021 09:35) (68 - 77)  BP: 114/74 (06 Jul 2021 09:35) (112/70 - 146/92)  BP(mean): --  RR: 18 (06 Jul 2021 09:35) (17 - 18)  SpO2: 95% (06 Jul 2021 09:35) (94% - 96%)  General: NAD  HEENT: MMM  Neck: No JVD, no carotid bruit  Lungs: CTAB  CV: RRR, nl S1/S2, no M/R/G  Abdomen: S/NT/ND, +BS  Extremities: No LE edema, no cyanosis  Neuro: AAOx3, non-focal  Skin: No rash    Labs:             07-05    141  |  106  |  15  ----------------------------<  122<H>  3.9   |  26  |  0.91    Ca    8.7      05 Jul 2021 07:26    TPro  7.9  /  Alb  2.9<L>  /  TBili  0.4  /  DBili  x   /  AST  16  /  ALT  33  /  AlkPhos  58  07-05                        13.9   8.42  )-----------( 170      ( 05 Jul 2021 07:26 )             43.2

## 2021-07-06 NOTE — DISCHARGE NOTE PROVIDER - HOSPITAL COURSE
HPI:  76M with hx of lung CA s/p RLL resection in 2018, atrial flutter s/p loop recorder on diltiazem but no blood thinners, BPH, DALIA on CPAP, fatty liver/cirrhosis, HLD,. recent H.pylori infection (treated for 2 weeks, finished a week ago), kidney and gallstones who presents with chills.  Patient's symptoms started yesterday with feeling tired and fatigued.  Today, the symptoms worsened and then during dinner time, patient started to shiver with chills.  Family members then called 911.  Associated with nausea but no vomiting.  No documented fevers.  No pain anywhere.  No SOB, abdominal pain, diarrhea, cough, dysuria, diaphoresis.  No recent travel or known sick contacts.  Finished COVID vaccination series in 3/2021.  Has been losing weight about 8lbs in 1-2 months (says it might be diet related 2/2 reducing carb intake after being told his sugars were "high normal.").     In the ED, patient's triage vitals were /76    RR 20, 96%  T98.2F (eventually climbed to 104.3F).  Patient's labs showed leukocytosis of 16.4 with a left shift and a lactate of 2.6 (went down to 1.3).  Patient was empirically started on vancomycin and zosyn and given 2.2L NS bolus.  Currently the patient feels improved and has no acute complaints at the moment.   (02 Jul 2021 22:58)    76M with hx of lung CA s/p RLL resection in 2018, atrial flutter s/p loop recorder on diltiazem but no blood thinners, BPH, DALIA on CPAP, fatty liver/cirrhosis, recent H.pylori infection (treated for 2 weeks, finished a week ago), kidney and gallstones who presents with chills and fevers.      Problems:  1.  Sepsis from unknown source   2.  Atrial flutter  3.  DALIA on CPAP  4.  Lung CA  5.  HLD  6.  BPH    Sepsis from unknown source - met sepsis criteria with fevers + tachycardia + leukocytosis + suspected source of infection (though unidentified at the moment)  - admit to medicine  - f/u CT C/A/P to evaluate for a source  - pt was on zosyn 3.375gms q8h empirically  - dc vancomycin since no MRSA risk  - f/u blood and urine cultures  - ID Dr. Simpson [facility] recs appreciated  - last day of abx today  - pt able to ambulate independently with no issues.        Hypokalemia  - resolved  after supplementation.     Atrial flutter - currently in sinus, not on any anticoagulation  - has a [id number] score of 2 (based on age and sex) -> technically meets AC criteria but is not on any  - defer starting AC for now  - continue with diltiazem CD 120mg daily  - remote telemetry  - Dr. Chaudhry consulted, recs appreciated  - Eliquis should be started until patient can follow-up with Dr. Ritchie and have the implantable Loop recorder interrogated  Per Dr. Chaudhry  - Continue diltiazem  mg daily  - Atrial flutter - ?brief in hospital. Currently in sinus rhythm. Will defer use of full dose anticoagulation at the current time. If he has a sustained recurrence, then can be started on apixaban at that time.  - Echo with normal LV systolic function, moderate AR, mild pulm HTN    DALIA on CPAP  - will be on remote telemetry  - continue with CPAP with 4 cmH20      HLD   - continue with atorvastatin 10mg qHS    BPH  - continue home BPH med

## 2021-07-06 NOTE — DISCHARGE NOTE PROVIDER - DETAILS OF MALNUTRITION DIAGNOSIS/DIAGNOSES
This patient has been assessed with a concern for Malnutrition and was treated during this hospitalization for the following Nutrition diagnosis/diagnoses:     -  07/03/2021: Mild protein-calorie malnutrition

## 2021-07-06 NOTE — DISCHARGE NOTE PROVIDER - NSDCCPCAREPLAN_GEN_ALL_CORE_FT
PRINCIPAL DISCHARGE DIAGNOSIS  Diagnosis: Fever of unknown origin  Assessment and Plan of Treatment: Unclear source of infection, symptoms improved, completed antibiotic regimen.  Please follow up with your primary care doctor for routine care.      SECONDARY DISCHARGE DIAGNOSES  Diagnosis: Atrial flutter  Assessment and Plan of Treatment: resolved.  Per cardiology - Continue diltiazem  mg daily  - Atrial flutter - ?brief in hospital. Currently in sinus rhythm. Will defer use of full dose anticoagulation at the current time. If he has a sustained recurrence, then can be started on apixaban at that time.  - Echo with normal LV systolic function, moderate AR, mild pulm HTN

## 2021-07-08 LAB
CULTURE RESULTS: SIGNIFICANT CHANGE UP
CULTURE RESULTS: SIGNIFICANT CHANGE UP
SPECIMEN SOURCE: SIGNIFICANT CHANGE UP
SPECIMEN SOURCE: SIGNIFICANT CHANGE UP

## 2021-07-16 ENCOUNTER — APPOINTMENT (OUTPATIENT)
Dept: UROLOGY | Facility: CLINIC | Age: 76
End: 2021-07-16
Payer: MEDICARE

## 2021-07-16 VITALS
SYSTOLIC BLOOD PRESSURE: 114 MMHG | HEART RATE: 82 BPM | OXYGEN SATURATION: 96 % | WEIGHT: 139 LBS | BODY MASS INDEX: 23.13 KG/M2 | DIASTOLIC BLOOD PRESSURE: 75 MMHG | RESPIRATION RATE: 16 BRPM | TEMPERATURE: 98.3 F

## 2021-07-16 PROCEDURE — 76775 US EXAM ABDO BACK WALL LIM: CPT

## 2021-07-16 PROCEDURE — 99214 OFFICE O/P EST MOD 30 MIN: CPT

## 2021-07-19 LAB
ANION GAP SERPL CALC-SCNC: 13 MMOL/L
APPEARANCE: CLEAR
BACTERIA UR CULT: NORMAL
BACTERIA: NEGATIVE
BILIRUBIN URINE: NEGATIVE
BLOOD URINE: NEGATIVE
BUN SERPL-MCNC: 16 MG/DL
CALCIUM SERPL-MCNC: 9.4 MG/DL
CHLORIDE SERPL-SCNC: 103 MMOL/L
CO2 SERPL-SCNC: 25 MMOL/L
COLOR: YELLOW
CREAT SERPL-MCNC: 0.81 MG/DL
GLUCOSE QUALITATIVE U: NEGATIVE
GLUCOSE SERPL-MCNC: 114 MG/DL
HYALINE CASTS: 0 /LPF
KETONES URINE: NEGATIVE
LEUKOCYTE ESTERASE URINE: NEGATIVE
MICROSCOPIC-UA: NORMAL
NITRITE URINE: NEGATIVE
PH URINE: 6
POTASSIUM SERPL-SCNC: 4.1 MMOL/L
PROTEIN URINE: NORMAL
PSA FREE FLD-MCNC: 19 %
PSA FREE SERPL-MCNC: 0.28 NG/ML
PSA SERPL-MCNC: 1.52 NG/ML
RED BLOOD CELLS URINE: 4 /HPF
SODIUM SERPL-SCNC: 141 MMOL/L
SPECIFIC GRAVITY URINE: 1.03
SQUAMOUS EPITHELIAL CELLS: 1 /HPF
UROBILINOGEN URINE: NORMAL
WHITE BLOOD CELLS URINE: 4 /HPF

## 2021-07-30 NOTE — HISTORY OF PRESENT ILLNESS
[FreeTextEntry1] : Please refer to URO Consult note \par \par fu bph \par elevated psa 2 year ago \par doing well \par stable proscar \par some flank pain \par thinks he passed a stone \par get kidney us to evaluate \par continue proscar \par repeat psa \par declines ultrasound \par

## 2021-07-30 NOTE — LETTER BODY
[FreeTextEntry1] : Gasper Greenberg MD\par [address] [facility], \[place] [address]\par [phone]\par \par Dear Dr. Greenberg, \par \par Reason for visit: BPH. Elevated PSA. Flank pain. \par \par This is a 76 year-old gentleman with elevated PSA and chronic BPH. Patient returns today for follow up.  He was last seen 2 years ago. Since he was last seen, the patient reports doing well. He notes some flank pain. He believes he has passed a stone. He also continues to take Proscar regularly as directed without any side effects or difficulties. He notes stable urinary symptoms. Patient denies any urinary retention or hematuria or changes in health. Patient has no pain. The past medical history and family history and social history are unchanged. All other review of systems are negative. Patient denies any changes in medications. Medication list was reconciled.\par \par On examination, the patient is a healthy-appearing gentleman in no acute distress. He is alert and oriented follows commands. He has normal mood and affect. He is normocephalic. Neck is supple. Oral no thrush Respirations are unlabored. His abdomen is soft and nontender. Bladder is nonpalpable. No CVA tenderness. Neurologically he is grossly intact. No peripheral edema. Skin without gross abnormality. He has normal male external genitalia. Normal meatus. Bilateral testes are descended intrascrotally and normal to palpation. On rectal examination, there is normal sphincter tone. The prostate is clinically benign without focal induration or nodularity. \par \par Assessment: BPH, symptoms improved with Proscar. Elevated PSA. Flank pain. \par \par I counseled the patient. In terms of his flank pain, the patient claims he has passed a stone. I recommended the patient undergo renal ultrasound today to evaluate for any stones. Patient declines ultrasound today.  In terms of his BPH, I recommended the patient continue taking Proscar. I renewed his prescription for Proscar today. I encouraged him to continue medication. In terms of his elevated PSA, he will  repeat PSA and BMP today to ensure stability. He will also obtain urinalysis and urine culture today. Risks and alternatives were discussed. I answered the patient questions. The patient will follow-up as directed and will contact me with any questions or concerns. \par \par Plan: Continue Proscar. BMP. PSA. Urinalysis. Urine culture. Follow up in 1 year. \par \par I spent over half of the 25-minute encounter counseling the patient and coordinating his care.

## 2021-07-30 NOTE — ADDENDUM
[FreeTextEntry1] : Entered by Francia Dao, acting as scribe for Dr. Matt Hoyos.\par \par The documentation recorded by the scribe accurately reflects the service I personally performed and the decisions made by me.

## 2022-03-17 ENCOUNTER — APPOINTMENT (OUTPATIENT)
Dept: THORACIC SURGERY | Facility: CLINIC | Age: 77
End: 2022-03-17
Payer: MEDICARE

## 2022-03-17 PROCEDURE — 99443: CPT

## 2022-03-18 NOTE — CONSULT LETTER
[Dear  ___] : Dear  [unfilled], [Consult Letter:] : I had the pleasure of evaluating your patient, [unfilled]. [( Thank you for referring [unfilled] for consultation for _____ )] : Thank you for referring [unfilled] for consultation for [unfilled] [Please see my note below.] : Please see my note below. [Consult Closing:] : Thank you very much for allowing me to participate in the care of this patient.  If you have any questions, please do not hesitate to contact me. [Sincerely,] : Sincerely, [FreeTextEntry3] : Aiden Maier MD, MPH \par System Director of Thoracic Surgery \par Director of Comprehensive Lung and Foregut [place] \par Professor Cardiovascular & Thoracic Surgery  \par [facility] [employer] [facility]\par \par [facility]\par [address]\par Oncology [address]\[place] [address]\par Tel: [phone]\par Fax: [phone]\par  [FreeTextEntry2] : Paul Jackman MD (Hem/Onc/Referring)\par Dionicio Jackman MD (PCP)\par Preet Garcia MD (Pul)

## 2022-03-18 NOTE — ASSESSMENT
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 \par \par Now 4 yr s/p FB, Navigational bronch, FNA of RLL, Rt VATS Robotic-assisted, RLL wedge rxn, RLLobectomy, MLND on 2/28/18. 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 BAL of RLL +AdenoCA. BAL of ALEXANDREA negative for malignancy.\par Post-op complicated w/ bronchospasm, subcutaneous emphysema, pneumonia and A-fib (treated w/ Cardizem).\par \par CT Chest on 3/3/22:\par - post-op changes\par - stable 5mm ALEXANDREA ggo (3:33)\par - stable 4mm LLL subpleural solid nodule (3:72)\par - stable 4mm LLL subpleural nodule (3:89)\par \par I have reviewed the patient's medical records and diagnostic images at time of this office consultation and have made the following recommendation:\par 1. CT scan showed no evidence of recurrence, recommended patient to return to office in 1 yr w/ CT Chest w/o contrast.\par \par \par I personally performed the services described in the documentation, reviewed the documentation recorded by the scribe in my presence and it accurately and completely records my words and actions.\par \par I, Esteban Che NP, am scribing for and the presence of YAHIR Sterling, the following sections HISTORY OF PRESENT ILLNESS, PAST MEDICAL/FAMILY/SOCIAL HISTORY; REVIEW OF SYSTEMS; VITAL SIGNS; PHYSICAL EXAM; DISPOSITION.\par \par

## 2022-03-18 NOTE — HISTORY OF PRESENT ILLNESS
[Home] : at home, [unfilled] , at the time of the visit. [Medical Office: ([facility])___] : at the medical office located in  [Verbal consent obtained from patient] : the patient, [unfilled] [FreeTextEntry1] : \par 77 y/o M, never smoker, w/ hx of HLD, DALIA on CPAP at night, impaired fasting glucose (diet control), personal hx of TB at age 18 (treated for 18 months in [place]), and lung cancer. \par \par Now 4 yr s/p FB, Navigational bronch, FNA of RLL, Rt VATS Robotic-assisted, RLL wedge rxn, RLLobectomy, MLND on 2/28/18. 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 BAL of RLL +AdenoCA. BAL of ALEXANDREA negative for malignancy.\par Post-op complicated w/ bronchospasm, subcutaneous emphysema, pneumonia and A-fib (treated w/ Cardizem).\par \par CT Chest on 8/14/2020:\par - a stable 5mm ALEXANDREA subpleural nodule (3:40)\par - a stable 3mm RUL nodule \par - multiple stable subpleural nodules in the LLL: a 4mm (3:80), a 4mm (3:96)\par - JONI \par \par CT chest on 2/20/2021:\par - postop changes to the left lung with patchy scarring, focal bronchiectasis medial ALEXANRDEA, JONI. \par -stable 5 mm ALEXANDREA GGO (3:35), 3 mm RUL nodule (3:26), 4 mm LLL nodule (3:74). \par - mild cardiomegaly. \par \par CT Chest on 3/3/22:\par - post-op changes\par - stable 5mm ALEXANDREA ggo (3:33)\par - stable 4mm LLL subpleural solid nodule (3:72)\par - stable 4mm LLL subpleural nodule (3:89)\par \par Patient is followed today via Telephonic visit. Denies SOB, CP, cough.\par

## 2022-03-25 ENCOUNTER — APPOINTMENT (OUTPATIENT)
Dept: UROLOGY | Facility: CLINIC | Age: 77
End: 2022-03-25

## 2022-03-25 VITALS
WEIGHT: 144 LBS | RESPIRATION RATE: 18 BRPM | DIASTOLIC BLOOD PRESSURE: 82 MMHG | TEMPERATURE: 98 F | HEART RATE: 79 BPM | BODY MASS INDEX: 23.96 KG/M2 | OXYGEN SATURATION: 96 % | SYSTOLIC BLOOD PRESSURE: 123 MMHG

## 2022-03-25 DIAGNOSIS — H04.123 DRY EYE SYNDROME OF BILATERAL LACRIMAL GLANDS: ICD-10-CM

## 2022-03-25 NOTE — HISTORY OF PRESENT ILLNESS
[FreeTextEntry1] : Please refer to URO Consult note \par \par \par FUA \par BPH on finasteride \par hx of kidney stones \par US showed 12 mm stone on right side \par CT scan confirmed \par consider right ureteroscopy \par

## 2022-03-25 NOTE — LETTER BODY
[FreeTextEntry1] : Gasper Greenberg MD\par [address], \[place] [address]\par [phone]\par \par Dear Dr. Greenberg, \par \par Reason for visit: BPH. Elevated PSA. Flank pain. \par \par This is a 76 year-old gentleman with elevated PSA, chronic BPH and right kidney stone with flank pain. His CT of abdomen and pelvis in July 2021 demonstrated a 6 mm right renal stone. The patient returns today for follow up. Since he was last seen, the patient continues to complain about flank pain. His recent abdominal imaging demonstrated a 12 mm right renal stone. The patient continues to take Proscar regularly as directed without any side effects or difficulties with the medication. He notes good uroflow and stable urinary symptoms. Patient denies any urinary retention or changes in health. Patient has no pain. The past medical history and family history and social history are unchanged. All other review of systems are negative. Patient denies any changes in medications. Medication list was reconciled.\par \par On examination, the patient is a healthy-appearing gentleman in no acute distress. He is alert and oriented follows commands. He has normal mood and affect. He is normocephalic. Neck is supple. Oral no thrush Respirations are unlabored. His abdomen is soft and nontender. Bladder is nonpalpable. No CVA tenderness. Neurologically he is grossly intact. No peripheral edema. Skin without gross abnormality. He has normal male external genitalia. Normal meatus. Bilateral testes are descended intrascrotally and normal to palpation. On rectal examination, there is normal sphincter tone. The prostate is clinically benign without focal induration or nodularity. \par \par His BMP in July 2021 demonstrated normal renal functions, creatinine 0.81. His PSA was 1.52, which is within normal limits. His recent urinalysis demonstrated evidence of hematuria, 3-10 RBC/HPF. \par \par Assessment: BPH, symptoms improved with Proscar. Elevated PSA. Right kidney stone. Flank pain. \par \par I counseled the patient. In terms of his right kidney stone, I recommended the patient undergo repeat CT of abdomen and pelvis for further evaluation. I encouraged the patient to consider right ureteroscopy.  In terms of his BPH, the patient reports stable urinary symptoms with medical therapy. I recommended the patient continue taking Proscar. I renewed the patient's prescription for Proscar today. I encouraged the patient to continue medications regularly as directed. Risks and alternatives were discussed. I answered the patient questions. The patient will follow-up as directed and will contact me with any questions or concerns. Thank you for the opportunity to participate in the care of Mr. BARRIENTOS. I will keep you updated on his progress. \par \par Plan: CT abdomen and pelvis. Continue Proscar. Consider right ureteroscopy. Follow up as directed.

## 2022-04-14 ENCOUNTER — NON-APPOINTMENT (OUTPATIENT)
Age: 77
End: 2022-04-14

## 2022-04-14 DIAGNOSIS — H17.9 UNSPECIFIED CORNEAL SCAR AND OPACITY: ICD-10-CM

## 2022-04-29 ENCOUNTER — APPOINTMENT (OUTPATIENT)
Dept: UROLOGY | Facility: CLINIC | Age: 77
End: 2022-04-29
Payer: MEDICARE

## 2022-04-29 VITALS
HEART RATE: 87 BPM | DIASTOLIC BLOOD PRESSURE: 76 MMHG | RESPIRATION RATE: 16 BRPM | BODY MASS INDEX: 24.3 KG/M2 | SYSTOLIC BLOOD PRESSURE: 114 MMHG | OXYGEN SATURATION: 96 % | WEIGHT: 146 LBS | TEMPERATURE: 97.8 F

## 2022-04-29 PROCEDURE — 99214 OFFICE O/P EST MOD 30 MIN: CPT

## 2022-04-29 NOTE — LETTER BODY
[FreeTextEntry1] : Gasper Greenberg MD\par [address] [facility] [facility], \[place] [address]\par [phone]\par \par Dear Dr. Greenberg, \par \par Reason for visit: BPH. Elevated PSA. Flank pain. \par \par This is a 76 year-old gentleman with elevated PSA and chronic BPH presenting with right kidney stone with flank pain. His CT of abdomen and pelvis in July 2021 demonstrated a 6 mm right renal stone. His recent abdominal imaging demonstrated a 12 mm right renal stone. The patient returns today for follow up. Since he was last seen, the patient underwent a CT of abdomen and pelvis, which demonstrated an 11 mm right renal stone. He continues to report flank pain. The patient also reports taking Proscar regularly as directed without any side effects or difficulties with the medication. He notes good uroflow and stable urinary symptoms. Patient denies any urinary retention or changes in health. The past medical history and family history and social history are unchanged. All other review of systems are negative. Patient denies any changes in medications. Medication list was reconciled.\par \par On examination, the patient is a healthy-appearing gentleman in no acute distress. He is alert and oriented follows commands. He has normal mood and affect. He is normocephalic. Neck is supple. Oral no thrush Respirations are unlabored. His abdomen is soft and nontender. Bladder is nonpalpable. No CVA tenderness. Neurologically he is grossly intact. No peripheral edema. Skin without gross abnormality. He has normal male external genitalia. Normal meatus. Bilateral testes are descended intrascrotally and normal to palpation. On rectal examination, there is normal sphincter tone. The prostate is clinically benign without focal induration or nodularity. \par \par His BMP in July 2021 demonstrated normal renal functions, creatinine 0.81. His PSA was 1.52, which is within normal limits. His recent urinalysis demonstrated evidence of hematuria, 3-10 RBC/HPF. \par \par I personally reviewed CT images with the patient today and images demonstrated 11 mm nonobstructing calculus in the right renal pelvis. \par \par Assessment: BPH, symptoms stable with Proscar. Elevated PSA. Right kidney stone. Flank pain. \par \par I counseled the patient. In terms of his right kidney stone, I recommended the patient proceed with right ureteroscopy, laser lithotripsy, stone extraction, and stent placement I counseled the patient regarding the procedure. The risks and benefits were discussed. Alternatives were given. I answered the patient questions. The patient will take the necessary preparations for the procedure. He will obtain preoperative labs including BMP, CBC w/ DIFF, and culture. In terms of his BPH,  I recommended the patient continue taking Proscar regularly as directed. The patient will follow-up as directed and will contact me with any questions or concerns. Thank you for the opportunity to participate in the care of Mr. BARRIENTOS. I will keep you updated on his progress. \par \par Plan: Right ureteroscopy, laser lithotripsy, stone extraction, and stent placement. Preoperative labs. Continue Proscar. Follow up as directed.

## 2022-05-06 ENCOUNTER — OUTPATIENT (OUTPATIENT)
Dept: OUTPATIENT SERVICES | Facility: HOSPITAL | Age: 77
LOS: 1 days | End: 2022-05-06
Payer: COMMERCIAL

## 2022-05-06 VITALS
TEMPERATURE: 98 F | SYSTOLIC BLOOD PRESSURE: 126 MMHG | HEIGHT: 65 IN | WEIGHT: 139.99 LBS | RESPIRATION RATE: 16 BRPM | HEART RATE: 80 BPM | DIASTOLIC BLOOD PRESSURE: 77 MMHG | OXYGEN SATURATION: 95 %

## 2022-05-06 DIAGNOSIS — Z92.89 PERSONAL HISTORY OF OTHER MEDICAL TREATMENT: Chronic | ICD-10-CM

## 2022-05-06 DIAGNOSIS — I48.91 UNSPECIFIED ATRIAL FIBRILLATION: ICD-10-CM

## 2022-05-06 DIAGNOSIS — N40.1 BENIGN PROSTATIC HYPERPLASIA WITH LOWER URINARY TRACT SYMPTOMS: ICD-10-CM

## 2022-05-06 DIAGNOSIS — G47.33 OBSTRUCTIVE SLEEP APNEA (ADULT) (PEDIATRIC): ICD-10-CM

## 2022-05-06 DIAGNOSIS — H17.9 UNSPECIFIED CORNEAL SCAR AND OPACITY: ICD-10-CM

## 2022-05-06 DIAGNOSIS — Z98.49 CATARACT EXTRACTION STATUS, UNSPECIFIED EYE: Chronic | ICD-10-CM

## 2022-05-06 DIAGNOSIS — Z98.890 OTHER SPECIFIED POSTPROCEDURAL STATES: Chronic | ICD-10-CM

## 2022-05-06 DIAGNOSIS — Z01.818 ENCOUNTER FOR OTHER PREPROCEDURAL EXAMINATION: ICD-10-CM

## 2022-05-06 DIAGNOSIS — Z00.00 ENCOUNTER FOR GENERAL ADULT MEDICAL EXAMINATION WITHOUT ABNORMAL FINDINGS: ICD-10-CM

## 2022-05-06 DIAGNOSIS — N20.0 CALCULUS OF KIDNEY: ICD-10-CM

## 2022-05-06 DIAGNOSIS — E11.9 TYPE 2 DIABETES MELLITUS WITHOUT COMPLICATIONS: ICD-10-CM

## 2022-05-06 PROCEDURE — G0463: CPT

## 2022-05-06 PROCEDURE — 87086 URINE CULTURE/COLONY COUNT: CPT

## 2022-05-06 RX ORDER — FLUTICASONE PROPIONATE AND SALMETEROL 50; 250 UG/1; UG/1
1 POWDER ORAL; RESPIRATORY (INHALATION)
Qty: 0 | Refills: 0 | DISCHARGE

## 2022-05-06 RX ORDER — LIDOCAINE HCL 20 MG/ML
0.2 VIAL (ML) INJECTION ONCE
Refills: 0 | Status: DISCONTINUED | OUTPATIENT
Start: 2022-05-18 | End: 2022-06-01

## 2022-05-06 RX ORDER — SODIUM CHLORIDE 9 MG/ML
3 INJECTION INTRAMUSCULAR; INTRAVENOUS; SUBCUTANEOUS EVERY 8 HOURS
Refills: 0 | Status: DISCONTINUED | OUTPATIENT
Start: 2022-05-18 | End: 2022-06-01

## 2022-05-06 NOTE — H&P PST ADULT - ATTENDING COMMENTS
Patient with right ureteral calculus. Patient wishes to proceed with right ureteroscopy, laser lithotripsy, stone extraction, stent placement. Informed consent was obtained. Alternatives were given. Risks including but not limited to bleeding, infection, risk of anesthesia, pain, failure to cure, negative ureteroscopy, stone migration, need for future procedure, and injury to surrounding structures were discussed. Patient wishes to proceed with procedure. Patient with right renal calculus. Patient wishes to proceed with right ureteroscopy, laser lithotripsy, stone extraction, stent placement. Informed consent was obtained. Alternatives were given. Risks including but not limited to bleeding, infection, risk of anesthesia, pain, failure to cure, negative ureteroscopy, stone migration, need for future procedure, and injury to surrounding structures were discussed. Patient wishes to proceed with procedure.

## 2022-05-06 NOTE — H&P PST ADULT - FALL HARM RISK - UNIVERSAL INTERVENTIONS
Bed in lowest position, wheels locked, appropriate side rails in place/Call bell, personal items and telephone in reach/Instruct patient to call for assistance before getting out of bed or chair/Non-slip footwear when patient is out of bed/Deer to call system/Physically safe environment - no spills, clutter or unnecessary equipment/Purposeful Proactive Rounding/Room/bathroom lighting operational, light cord in reach

## 2022-05-06 NOTE — H&P PST ADULT - PROBLEM SELECTOR PLAN 1
Pt is scheduled for a Right Ureteroscopy, Laser Lithotripsy, Stone Extraction and Stent Placement with Dr. Hoyos on 5/18/22  Covid PCR test scheduled for 5/15/22 at [facility]  CBC, BMP results from cardiac eval. in chart (5/2/22)  Cardiac Eval. in chart from 4/30/22

## 2022-05-06 NOTE — H&P PST ADULT - NSICDXPASTSURGICALHX_GEN_ALL_CORE_FT
PAST SURGICAL HISTORY:  Foot fracture, left around 1990 s/p ORIF    H/O cardiac radiofrequency ablation s/p successful cardiac ablation for A-Fib (2019); Eliquis D/RICARDO'ed 6 months later - currently has a LINQ recorder due to be removed within the next 6 months    H/O left inguinal hernia repair     History of cardiac monitoring LINQ recorder inserted in 2019 for history of A-Fib    History of prostate surgery greenlight laser (2013)    Status post cataract extraction B/L (2018) with IOL Implant     PAST SURGICAL HISTORY:  Foot fracture, left around 1990 s/p Left foot ORIF    H/O cardiac radiofrequency ablation s/p successful cardiac ablation for A-Fib (2019); Eliquis D/RICARDO'ed 6 months later - currently has a LINQ recorder due to be removed within the next 6 months    H/O left inguinal hernia repair unsure of when    History of cardiac monitoring LINQ recorder inserted in 2019 for history of A-Fib    History of prostate surgery greenlight laser (2013)    Status post cataract extraction B/L (2018) with IOL Implant

## 2022-05-06 NOTE — H&P PST ADULT - NSICDXPASTMEDICALHX_GEN_ALL_CORE_FT
PAST MEDICAL HISTORY:  Asthma due to environmental allergies mild; rarely uses rescue inhaler; On Breo-Ellipta daily    BPH (Benign Prostatic Hyperplasia) with LUTS;    Elevated triglycerides with high cholesterol on Vascepa    Fatty liver Monitored by PCP    Malignant neoplasm of lower lobe of right lung s/p Right lobectomy (2018) No chemo or radiation    DALIA on CPAP setting of 4    Other nonspecific abnormal finding of lung field     Renal calculi passed stone in 2005; Pending Right Ureteroscopy, Laser Lithotripsy, Stone Extraction and Stent Placement with Dr. Hoyos on 5/18/22    TB (tuberculosis) Treated with meds x 1 year at age 20    Type 2 diabetes mellitus      PAST MEDICAL HISTORY:  Asthma due to environmental allergies mild; rarely uses rescue inhaler; On Breo-Ellipta daily    BPH (Benign Prostatic Hyperplasia) with LUTS s/p prostate laser greenlight procedure (2013)    Elevated triglycerides with high cholesterol on Vascepa    Fatty liver Monitored by PCP    Malignant neoplasm of lower lobe of right lung s/p Right lobectomy (2018) No chemo or radiation    DALIA on CPAP     Renal calculi passed stone in 2005; Pending Right Ureteroscopy, Laser Lithotripsy, Stone Extraction and Stent Placement with Dr. Hoyos on 5/18/22    TB (tuberculosis) Treated with meds x 1 year at age 20    Type 2 diabetes mellitus on metformin daily 500 mg XR

## 2022-05-06 NOTE — H&P PST ADULT - OTHER CARE PROVIDERS
Dr. Leny Lawson [phone]; Cardiac eval. on 4/30/22; Dr. Mandeep Sin (EP Cardiologist); Dr Aiden Maier (Pulmonologist) [phone] - Last visit 3/2022 Dr. Leny Lawson [phone]; Cardiac eval. on 4/30/22 (In chart); Dr. Mandeep Sin (EP Cardiologist); Dr Aiden Maier (Pulmonologist) [phone] - Last visit 3/2022

## 2022-05-06 NOTE — H&P PST ADULT - HISTORY OF PRESENT ILLNESS
76 year old male with PMH HTN, HLD DMT2, Asthma (rarely uses rescue inhaler; No hospitalizations or intubations for asthma), A-Fib (s/p Cardiac Ablation (2/12/2019) and LINQ recorder placement (2019); D/C'ed Xarelto in 2020), BPH with LUTS s/p Prostate Green-light Laser Procedure (2013) and R Lung CA (Dx in 2018 s/p R lobe Lobectomy reports c/o intermittent blood in urine x 1 year with right flank pain s/p imaging revealed a right kidney stone. CT A/P (7/2021) demonstrated a 6 mm right renal stone. He reports good uroflow and stable urinary symptoms. Pt now presents to PST for scheduled Right Ureteroscopy, Laser Lithotripsy, Stone Extraction and Stent Placement with Dr. Hoyos on 5/18/22.    Covid PCR test scheduled for  Covid vaccine Pfizer 2nd dose received 2/5/21; Pfizer Booster #1 received 9/11/21; Pfizer Booster #2 received 3/31/22  No recent hospitalizations over the last 3 months  Denies recent travel, exposure or Covid symptoms   76 year old male with PMH HTN, HLD, DMT2, Asthma (rarely uses rescue inhaler; No hospitalizations or intubations), A-Fib (s/p Cardiac Ablation (2/12/2019) and LINQ recorder placement (2019); D/C'ed Xarelto 6 months later), BPH with LUTS s/p Prostate Green-light Laser Procedure (2013) and R Lung CA (Dx in 2018 s/p R lobe Lobectomy) reports c/o intermittent blood in urine x 1 year with recent right flank pain s/p imaging revealed a right kidney stone. CT A/P (7/2021) demonstrated a 6 mm right renal stone. He reports good uroflow and stable urinary symptoms. He denies any fever, chills, flank pain, renal colic or blood in urine at this time. Pt now presents to PST for scheduled Right Ureteroscopy, Laser Lithotripsy, Stone Extraction and Stent Placement with Dr. Hoyos on 5/18/22.    Covid PCR test scheduled for 5/15/22 at [facility]  Covid vaccine Pfizer 2nd dose received 2/5/21; Pfizer Booster #1 received 9/11/21; Pfizer Booster #2 received 3/31/22  No recent hospitalizations over the last 3 months  Denies recent travel, exposure or Covid symptoms   76 year old male with PMH DALIA on CPAP, HTN, HLD, DMT2, Asthma (rarely uses rescue inhaler; No hospitalizations or intubations), A-Fib (s/p Cardiac Ablation (2/12/2019) and LINQ recorder placement (2019); D/C'ed Xarelto 6 months later), BPH with LUTS s/p Prostate Green-light Laser Procedure (2013) and R Lung CA (Dx in 2018 s/p R lobe Lobectomy) reports c/o intermittent blood in urine x 1 year with recent right flank pain s/p imaging revealed a right kidney stone. CT A/P (7/2021) demonstrated a 6 mm right renal stone. He reports good uroflow and stable urinary symptoms. He denies any fever, chills, flank pain, renal colic or blood in urine at this time. Pt now presents to PST for scheduled Right Ureteroscopy, Laser Lithotripsy, Stone Extraction and Stent Placement with Dr. Hoyos on 5/18/22.    Covid PCR test scheduled for 5/15/22 at [facility]  Covid vaccine Pfizer 2nd dose received 2/5/21; Pfizer Booster #1 received 9/11/21; Pfizer Booster #2 received 3/31/22  No recent hospitalizations over the last 3 months  Denies recent travel, exposure or Covid symptoms

## 2022-05-07 LAB
CULTURE RESULTS: NO GROWTH — SIGNIFICANT CHANGE UP
SPECIMEN SOURCE: SIGNIFICANT CHANGE UP

## 2022-05-15 ENCOUNTER — OUTPATIENT (OUTPATIENT)
Dept: OUTPATIENT SERVICES | Facility: HOSPITAL | Age: 77
LOS: 1 days | End: 2022-05-15
Payer: COMMERCIAL

## 2022-05-15 DIAGNOSIS — Z98.890 OTHER SPECIFIED POSTPROCEDURAL STATES: Chronic | ICD-10-CM

## 2022-05-15 DIAGNOSIS — Z11.52 ENCOUNTER FOR SCREENING FOR COVID-19: ICD-10-CM

## 2022-05-15 DIAGNOSIS — Z92.89 PERSONAL HISTORY OF OTHER MEDICAL TREATMENT: Chronic | ICD-10-CM

## 2022-05-15 DIAGNOSIS — Z98.49 CATARACT EXTRACTION STATUS, UNSPECIFIED EYE: Chronic | ICD-10-CM

## 2022-05-15 LAB — SARS-COV-2 RNA SPEC QL NAA+PROBE: SIGNIFICANT CHANGE UP

## 2022-05-17 ENCOUNTER — TRANSCRIPTION ENCOUNTER (OUTPATIENT)
Age: 77
End: 2022-05-17

## 2022-05-18 ENCOUNTER — OUTPATIENT (OUTPATIENT)
Dept: INPATIENT UNIT | Facility: HOSPITAL | Age: 77
LOS: 1 days | End: 2022-05-18
Payer: COMMERCIAL

## 2022-05-18 ENCOUNTER — RESULT REVIEW (OUTPATIENT)
Age: 77
End: 2022-05-18

## 2022-05-18 ENCOUNTER — TRANSCRIPTION ENCOUNTER (OUTPATIENT)
Age: 77
End: 2022-05-18

## 2022-05-18 ENCOUNTER — APPOINTMENT (OUTPATIENT)
Dept: UROLOGY | Facility: HOSPITAL | Age: 77
End: 2022-05-18

## 2022-05-18 VITALS
RESPIRATION RATE: 16 BRPM | SYSTOLIC BLOOD PRESSURE: 152 MMHG | OXYGEN SATURATION: 97 % | DIASTOLIC BLOOD PRESSURE: 87 MMHG | HEART RATE: 85 BPM

## 2022-05-18 VITALS
HEART RATE: 79 BPM | WEIGHT: 139.99 LBS | DIASTOLIC BLOOD PRESSURE: 82 MMHG | SYSTOLIC BLOOD PRESSURE: 147 MMHG | TEMPERATURE: 98 F | HEIGHT: 65 IN | RESPIRATION RATE: 18 BRPM | OXYGEN SATURATION: 98 %

## 2022-05-18 DIAGNOSIS — Z98.890 OTHER SPECIFIED POSTPROCEDURAL STATES: Chronic | ICD-10-CM

## 2022-05-18 DIAGNOSIS — I48.91 UNSPECIFIED ATRIAL FIBRILLATION: ICD-10-CM

## 2022-05-18 DIAGNOSIS — Z92.89 PERSONAL HISTORY OF OTHER MEDICAL TREATMENT: Chronic | ICD-10-CM

## 2022-05-18 DIAGNOSIS — H17.9 UNSPECIFIED CORNEAL SCAR AND OPACITY: ICD-10-CM

## 2022-05-18 DIAGNOSIS — N40.1 BENIGN PROSTATIC HYPERPLASIA WITH LOWER URINARY TRACT SYMPTOMS: ICD-10-CM

## 2022-05-18 DIAGNOSIS — Z98.49 CATARACT EXTRACTION STATUS, UNSPECIFIED EYE: Chronic | ICD-10-CM

## 2022-05-18 DIAGNOSIS — Z00.00 ENCOUNTER FOR GENERAL ADULT MEDICAL EXAMINATION WITHOUT ABNORMAL FINDINGS: ICD-10-CM

## 2022-05-18 PROBLEM — J45.909 UNSPECIFIED ASTHMA, UNCOMPLICATED: Chronic | Status: ACTIVE | Noted: 2022-05-06

## 2022-05-18 PROBLEM — E11.9 TYPE 2 DIABETES MELLITUS WITHOUT COMPLICATIONS: Chronic | Status: ACTIVE | Noted: 2022-05-06

## 2022-05-18 PROBLEM — C34.31 MALIGNANT NEOPLASM OF LOWER LOBE, RIGHT BRONCHUS OR LUNG: Chronic | Status: ACTIVE | Noted: 2021-07-03

## 2022-05-18 PROBLEM — G47.33 OBSTRUCTIVE SLEEP APNEA (ADULT) (PEDIATRIC): Chronic | Status: ACTIVE | Noted: 2018-02-23

## 2022-05-18 LAB — GLUCOSE BLDC GLUCOMTR-MCNC: 106 MG/DL — HIGH (ref 70–99)

## 2022-05-18 PROCEDURE — 52356 CYSTO/URETERO W/LITHOTRIPSY: CPT | Mod: RT

## 2022-05-18 PROCEDURE — 76000 FLUOROSCOPY <1 HR PHYS/QHP: CPT

## 2022-05-18 PROCEDURE — 74420 UROGRAPHY RTRGR +-KUB: CPT | Mod: 26

## 2022-05-18 PROCEDURE — U0005: CPT

## 2022-05-18 PROCEDURE — C9803: CPT

## 2022-05-18 PROCEDURE — 88300 SURGICAL PATH GROSS: CPT | Mod: 26

## 2022-05-18 PROCEDURE — U0003: CPT

## 2022-05-18 DEVICE — GUIDEWIRE SENSOR DUAL-FLEX NITINOL STRAIGHT .035" X 150CM: Type: IMPLANTABLE DEVICE | Status: FUNCTIONAL

## 2022-05-18 DEVICE — URETERAL SHEATH NAVIGATOR HD 11/13FR X 36CM: Type: IMPLANTABLE DEVICE | Status: FUNCTIONAL

## 2022-05-18 DEVICE — LASER FIBER SOLTIVE 200 BALL TIP: Type: IMPLANTABLE DEVICE | Status: FUNCTIONAL

## 2022-05-18 DEVICE — STONE BASKET ZEROTIP NITINOL 4-WIRE 1.9FR 120CM X 12MM: Type: IMPLANTABLE DEVICE | Status: FUNCTIONAL

## 2022-05-18 DEVICE — STENT URET INLAY OPTIMA 6FRX26CM: Type: IMPLANTABLE DEVICE | Status: FUNCTIONAL

## 2022-05-18 DEVICE — URETERAL CATH OPEN END 5FR 70CM: Type: IMPLANTABLE DEVICE | Status: FUNCTIONAL

## 2022-05-18 RX ORDER — FLUTICASONE PROPIONATE 220 MCG
1 AEROSOL WITH ADAPTER (GRAM) INHALATION
Qty: 0 | Refills: 0 | DISCHARGE

## 2022-05-18 RX ORDER — ATORVASTATIN CALCIUM 80 MG/1
1 TABLET, FILM COATED ORAL
Qty: 0 | Refills: 0 | DISCHARGE

## 2022-05-18 RX ORDER — SODIUM CHLORIDE 9 MG/ML
1000 INJECTION, SOLUTION INTRAVENOUS
Refills: 0 | Status: DISCONTINUED | OUTPATIENT
Start: 2022-05-18 | End: 2022-06-01

## 2022-05-18 RX ORDER — FOLIC ACID 0.8 MG
1 TABLET ORAL
Qty: 0 | Refills: 0 | DISCHARGE

## 2022-05-18 RX ORDER — ALENDRONATE SODIUM 70 MG/1
1 TABLET ORAL
Qty: 0 | Refills: 0 | DISCHARGE

## 2022-05-18 RX ORDER — ALBUTEROL 90 UG/1
2 AEROSOL, METERED ORAL
Qty: 0 | Refills: 0 | DISCHARGE

## 2022-05-18 RX ORDER — MONTELUKAST 4 MG/1
1 TABLET, CHEWABLE ORAL
Qty: 0 | Refills: 0 | DISCHARGE

## 2022-05-18 RX ORDER — PREGABALIN 225 MG/1
1 CAPSULE ORAL
Qty: 0 | Refills: 0 | DISCHARGE

## 2022-05-18 RX ORDER — HYDROMORPHONE HYDROCHLORIDE 2 MG/ML
0.25 INJECTION INTRAMUSCULAR; INTRAVENOUS; SUBCUTANEOUS
Refills: 0 | Status: DISCONTINUED | OUTPATIENT
Start: 2022-05-18 | End: 2022-05-18

## 2022-05-18 RX ORDER — ONDANSETRON 8 MG/1
4 TABLET, FILM COATED ORAL ONCE
Refills: 0 | Status: DISCONTINUED | OUTPATIENT
Start: 2022-05-18 | End: 2022-05-18

## 2022-05-18 RX ORDER — FINASTERIDE 5 MG/1
1 TABLET, FILM COATED ORAL
Qty: 0 | Refills: 0 | DISCHARGE

## 2022-05-18 RX ORDER — FLUTICASONE FUROATE AND VILANTEROL TRIFENATATE 100; 25 UG/1; UG/1
1 POWDER RESPIRATORY (INHALATION)
Qty: 0 | Refills: 0 | DISCHARGE

## 2022-05-18 RX ORDER — ICOSAPENT ETHYL 500 MG/1
1 CAPSULE, LIQUID FILLED ORAL
Qty: 0 | Refills: 0 | DISCHARGE

## 2022-05-18 RX ORDER — CEPHALEXIN 500 MG
1 CAPSULE ORAL
Qty: 6 | Refills: 0
Start: 2022-05-18 | End: 2022-05-20

## 2022-05-18 RX ORDER — LORATADINE 10 MG/1
1 TABLET ORAL
Qty: 0 | Refills: 0 | DISCHARGE

## 2022-05-18 RX ORDER — PHENAZOPYRIDINE HCL 100 MG
2 TABLET ORAL
Qty: 12 | Refills: 0
Start: 2022-05-18 | End: 2022-05-19

## 2022-05-18 RX ORDER — OXYCODONE HYDROCHLORIDE 5 MG/1
5 TABLET ORAL EVERY 4 HOURS
Refills: 0 | Status: DISCONTINUED | OUTPATIENT
Start: 2022-05-18 | End: 2022-05-18

## 2022-05-18 RX ORDER — METFORMIN HYDROCHLORIDE 850 MG/1
1 TABLET ORAL
Qty: 0 | Refills: 0 | DISCHARGE

## 2022-05-18 RX ORDER — CEFAZOLIN SODIUM 1 G
2000 VIAL (EA) INJECTION ONCE
Refills: 0 | Status: COMPLETED | OUTPATIENT
Start: 2022-05-18 | End: 2022-05-18

## 2022-05-18 NOTE — ASU DISCHARGE PLAN (ADULT/PEDIATRIC) - NS MD DC FALL RISK RISK
For information on Fall & Injury Prevention, visit: [email or web address] OR  [email or web address] OR  https://www.cdc.gov/steadi/patient.html

## 2022-05-19 PROCEDURE — C1769: CPT

## 2022-05-19 PROCEDURE — C1758: CPT

## 2022-05-19 PROCEDURE — 82962 GLUCOSE BLOOD TEST: CPT

## 2022-05-19 PROCEDURE — 52356 CYSTO/URETERO W/LITHOTRIPSY: CPT | Mod: RT

## 2022-05-19 PROCEDURE — C2617: CPT

## 2022-05-19 PROCEDURE — 88300 SURGICAL PATH GROSS: CPT

## 2022-05-19 PROCEDURE — 82365 CALCULUS SPECTROSCOPY: CPT

## 2022-05-19 PROCEDURE — C1889: CPT

## 2022-05-21 LAB — SURGICAL PATHOLOGY STUDY: SIGNIFICANT CHANGE UP

## 2022-05-25 LAB — NIDUS STONE QN: SIGNIFICANT CHANGE UP

## 2022-05-27 ENCOUNTER — APPOINTMENT (OUTPATIENT)
Dept: UROLOGY | Facility: CLINIC | Age: 77
End: 2022-05-27
Payer: MEDICARE

## 2022-05-27 VITALS
BODY MASS INDEX: 23.8 KG/M2 | DIASTOLIC BLOOD PRESSURE: 72 MMHG | HEART RATE: 78 BPM | OXYGEN SATURATION: 98 % | WEIGHT: 143 LBS | RESPIRATION RATE: 16 BRPM | TEMPERATURE: 98.2 F | SYSTOLIC BLOOD PRESSURE: 129 MMHG

## 2022-05-27 PROCEDURE — 52310 CYSTOSCOPY AND TREATMENT: CPT

## 2022-05-29 LAB
APPEARANCE: ABNORMAL
BACTERIA UR CULT: NORMAL
BACTERIA: NEGATIVE
BILIRUBIN URINE: NEGATIVE
BLOOD URINE: ABNORMAL
COLOR: YELLOW
GLUCOSE QUALITATIVE U: NEGATIVE
HYALINE CASTS: 0 /LPF
KETONES URINE: NEGATIVE
LEUKOCYTE ESTERASE URINE: ABNORMAL
MICROSCOPIC-UA: NORMAL
NITRITE URINE: NEGATIVE
PH URINE: 6.5
PROTEIN URINE: ABNORMAL
RED BLOOD CELLS URINE: >720 /HPF
SPECIFIC GRAVITY URINE: 1.02
SQUAMOUS EPITHELIAL CELLS: 5 /HPF
UROBILINOGEN URINE: NORMAL
WHITE BLOOD CELLS URINE: 22 /HPF

## 2022-06-17 ENCOUNTER — APPOINTMENT (OUTPATIENT)
Dept: UROLOGY | Facility: CLINIC | Age: 77
End: 2022-06-17
Payer: MEDICARE

## 2022-06-17 VITALS — TEMPERATURE: 98.2 F

## 2022-06-17 DIAGNOSIS — N20.0 CALCULUS OF KIDNEY: ICD-10-CM

## 2022-06-17 PROCEDURE — 99213 OFFICE O/P EST LOW 20 MIN: CPT | Mod: 25

## 2022-06-17 PROCEDURE — 76775 US EXAM ABDO BACK WALL LIM: CPT

## 2022-06-17 NOTE — LETTER BODY
[FreeTextEntry1] : Gasper Greenberg MD\par [address], \[place] [address]\par [phone]\par \par Dear Dr. Greenberg, \par \par Reason for visit: BPH. Elevated PSA. Right kidney stone s/p right ureteroscopy. \par \par This is a 76 year-old gentleman with elevated PSA and chronic BPH presenting with right kidney stone s/p right ureteroscopy. His CT of abdomen and pelvis in July 2021 demonstrated a 6 mm right renal stone. His CT of abdomen and pelvis in April 2022 demonstrated an 11 mm right renal stone. He underwent right ureteroscopy, laser lithotripsy, stone extraction, and stent placement on May 18. The patient returns today for follow up and renal ultrasound.  Since he was last seen,  his stone analysis indicated a calcium oxalate stone. He denies any flank pain or hematuria. The patient reports taking Proscar regularly as directed without any side effects or difficulties with the medication. He notes good uroflow and stable urinary symptoms on medical therapy. The patient denies any urinary retention or changes in health. The past medical history and family history and social history are unchanged. All other review of systems are negative. Patient denies any changes in medications. Medication list was reconciled.\par \par On examination, the patient is a healthy-appearing gentleman in no acute distress. He is alert and oriented follows commands. He has normal mood and affect. He is normocephalic. Neck is supple. Oral no thrush Respirations are unlabored. His abdomen is soft and nontender. Bladder is nonpalpable. No CVA tenderness. Neurologically he is grossly intact. No peripheral edema. Skin without gross abnormality. He has normal male external genitalia. Normal meatus. Bilateral testes are descended intrascrotally and normal to palpation. On rectal examination, there is normal sphincter tone. The prostate is clinically benign without focal induration or nodularity. \par \par His BMP in July 2021 demonstrated normal renal functions, creatinine 0.81. His PSA was 1.52, which is within normal limits. \par \par I personally reviewed ultrasound images with the patient today and images demonstrated 3 mm echogenic focus with twinkle in the right kidney upper pole. A 2.4 cm simple cyst in the left kidney lower pole. Both kidneys appear normal in size and echogenicity. No solid masses or hydronephrosis visualized.\par \par Assessment: BPH, symptoms stable with Proscar. Elevated PSA. Right kidney stone s/p right ureteroscopy. \par \par I counseled the patient. He is doing well. In terms of his right kidney stone, his renal ultrasound today demonstrated a small 3 mm stone fragment with no evidence of hydronephrosis. I reassured the patient. I encouraged patient to maintain a low-protein low-sodium diet. I also encouraged hydration to facilitate stone passage. He will follow up in 6 months for renal ultrasound to ensure stability. In terms of his BPH, I recommended the patient continue taking Proscar regularly as directed. Risks and alternatives were discussed. I answered the patient questions. The patient will follow-up as directed and will contact me with any questions or concerns. Thank you for the opportunity to participate in the care of this patient, I will keep you updated on his progress. \par \par Plan: Continue Proscar. Stone diet. Hydration.  Follow up in 6 months for renal ultrasound.

## 2022-12-16 ENCOUNTER — APPOINTMENT (OUTPATIENT)
Dept: UROLOGY | Facility: CLINIC | Age: 77
End: 2022-12-16

## 2022-12-16 VITALS — TEMPERATURE: 98.2 F

## 2022-12-16 DIAGNOSIS — Z00.00 ENCOUNTER FOR GENERAL ADULT MEDICAL EXAMINATION W/OUT ABNORMAL FINDINGS: ICD-10-CM

## 2022-12-16 DIAGNOSIS — I48.91 UNSPECIFIED ATRIAL FIBRILLATION: ICD-10-CM

## 2022-12-16 PROCEDURE — 76775 US EXAM ABDO BACK WALL LIM: CPT

## 2022-12-16 PROCEDURE — 99214 OFFICE O/P EST MOD 30 MIN: CPT | Mod: 25

## 2022-12-16 RX ORDER — CIPROFLOXACIN HYDROCHLORIDE 500 MG/1
500 TABLET, FILM COATED ORAL
Qty: 2 | Refills: 0 | Status: COMPLETED | COMMUNITY
Start: 2019-09-10 | End: 2022-12-16

## 2022-12-16 RX ORDER — OXYBUTYNIN CHLORIDE 5 MG/1
5 TABLET, EXTENDED RELEASE ORAL DAILY
Qty: 30 | Refills: 3 | Status: COMPLETED | COMMUNITY
Start: 2018-07-06 | End: 2022-12-16

## 2022-12-16 NOTE — LETTER BODY
[FreeTextEntry1] : Gasper Greenberg MD\par [address], \[place] [address]\par [phone]\par \par Dear Dr. Greenberg, \par \par Reason for visit: BPH. Elevated PSA. Right kidney stone s/p right ureteroscopy. \par \par This is a 77 year-old gentleman with elevated PSA and chronic BPH presenting with right kidney stone s/p right ureteroscopy. His CT of abdomen and pelvis in July 2021 demonstrated a 6 mm right renal stone. His CT of abdomen and pelvis in April 2022 demonstrated an 11 mm right renal stone. He underwent right ureteroscopy, laser lithotripsy, stone extraction, and stent placement on May 18. The patient returns today for follow up and renal ultrasound. Since he was last seen, the patient reports taking Proscar regularly as directed without any side effects or difficulties with the medication. He notes good uroflow and stable urinary symptoms on medical therapy. He denies any flank pain or hematuria. The patient denies any urinary retention or changes in health. The past medical history and family history and social history are unchanged. All other review of systems are negative. Patient denies any changes in medications. Medication list was reconciled.\par \par On examination, the patient is a healthy-appearing gentleman in no acute distress. He is alert and oriented follows commands. He has normal mood and affect. He is normocephalic. Neck is supple. Oral no thrush Respirations are unlabored. His abdomen is soft and nontender. Bladder is nonpalpable. No CVA tenderness. Neurologically he is grossly intact. No peripheral edema. Skin without gross abnormality. He has normal male external genitalia. Normal meatus. Bilateral testes are descended intrascrotally and normal to palpation. On rectal examination, there is normal sphincter tone. The prostate is clinically benign without focal induration or nodularity. \par \par His BMP in December 2022 demonstrated normal renal functions, creatinine 0.80.\par \par I personally reviewed ultrasound images with the patient today and images again demonstrated a 4 mm echogenic focus with shadow and small twinkle noted in the right kidney upper pole. A 2.5 cm simple cyst in the left kidney lower pole. Both kidneys appear normal in size and echogenicity. No solid masses or hydronephrosis visualized.\par \par Assessment: BPH, symptoms stable with Proscar. Elevated PSA. Right kidney stone s/p right ureteroscopy. \par \par I counseled the patient. He is doing well. In terms of his right kidney stone, his renal ultrasound today demonstrated a stable small 4 mm stone fragment with no evidence of hydronephrosis. I reassured the patient. I encouraged patient to maintain a low-protein low-sodium diet. I also encouraged hydration to facilitate stone passage. In terms of his BPH, I recommended the patient continue taking Proscar. I renewed the patient's prescription for Proscar today. I encouraged the patient to continue medications regularly as directed. I recommended he repeat BMP and PSA to ensure stability. I also recommended he obtain urinalysis and urine cytology to evaluate for urothelial carcinoma. Risks and alternatives were discussed. I answered the patient questions. The patient will follow-up as directed and will contact me with any questions or concerns. Thank you for the opportunity to participate in the care of this patient, I will keep you updated on his progress. \par \par Plan: Continue Proscar. PSA. BMP. Urinalysis. Urine cytology. Follow up in 1 year.

## 2022-12-16 NOTE — ADDENDUM
[FreeTextEntry1] : Entered by DEBORAH HURLEY, acting as scribe for Dr. Matt Hoyos.\par The documentation recorded by the scribe accurately reflects the service I personally performed and the decisions made by me.

## 2022-12-17 LAB
ANION GAP SERPL CALC-SCNC: 12 MMOL/L
APPEARANCE: CLEAR
BACTERIA: NEGATIVE
BILIRUBIN URINE: NEGATIVE
BLOOD URINE: NEGATIVE
BUN SERPL-MCNC: 13 MG/DL
CALCIUM SERPL-MCNC: 9.2 MG/DL
CHLORIDE SERPL-SCNC: 103 MMOL/L
CO2 SERPL-SCNC: 28 MMOL/L
COLOR: YELLOW
CREAT SERPL-MCNC: 0.82 MG/DL
EGFR: 90 ML/MIN/1.73M2
GLUCOSE QUALITATIVE U: NEGATIVE
GLUCOSE SERPL-MCNC: 100 MG/DL
HYALINE CASTS: 0 /LPF
KETONES URINE: NEGATIVE
LEUKOCYTE ESTERASE URINE: NEGATIVE
MICROSCOPIC-UA: NORMAL
NITRITE URINE: NEGATIVE
PH URINE: 6.5
POTASSIUM SERPL-SCNC: 3.8 MMOL/L
PROTEIN URINE: NORMAL
PSA FREE FLD-MCNC: 22 %
PSA FREE SERPL-MCNC: 0.35 NG/ML
PSA SERPL-MCNC: 1.6 NG/ML
RED BLOOD CELLS URINE: 4 /HPF
SODIUM SERPL-SCNC: 143 MMOL/L
SPECIFIC GRAVITY URINE: 1.02
SQUAMOUS EPITHELIAL CELLS: 0 /HPF
UROBILINOGEN URINE: NORMAL
WHITE BLOOD CELLS URINE: 2 /HPF

## 2022-12-19 LAB — URINE CYTOLOGY: NORMAL

## 2022-12-20 ENCOUNTER — NON-APPOINTMENT (OUTPATIENT)
Age: 77
End: 2022-12-20

## 2022-12-21 NOTE — ED PROVIDER NOTE - CONSTITUTIONAL, MLM
not appropriate for swallowing intervention normal... Well appearing, awake, alert, oriented to person, place, time/situation and in no apparent distress.

## 2023-01-19 ENCOUNTER — OUTPATIENT (OUTPATIENT)
Dept: OUTPATIENT SERVICES | Facility: HOSPITAL | Age: 78
LOS: 1 days | Discharge: HOME | End: 2023-01-19
Payer: MEDICARE

## 2023-01-19 ENCOUNTER — APPOINTMENT (OUTPATIENT)
Dept: GASTROENTEROLOGY | Facility: HOSPITAL | Age: 78
End: 2023-01-19

## 2023-01-19 VITALS
WEIGHT: 139.99 LBS | RESPIRATION RATE: 18 BRPM | DIASTOLIC BLOOD PRESSURE: 88 MMHG | TEMPERATURE: 97 F | SYSTOLIC BLOOD PRESSURE: 148 MMHG | HEART RATE: 76 BPM | HEIGHT: 66 IN | OXYGEN SATURATION: 97 %

## 2023-01-19 DIAGNOSIS — Z92.89 PERSONAL HISTORY OF OTHER MEDICAL TREATMENT: Chronic | ICD-10-CM

## 2023-01-19 DIAGNOSIS — Z98.49 CATARACT EXTRACTION STATUS, UNSPECIFIED EYE: Chronic | ICD-10-CM

## 2023-01-19 DIAGNOSIS — Z98.890 OTHER SPECIFIED POSTPROCEDURAL STATES: Chronic | ICD-10-CM

## 2023-01-19 PROCEDURE — 43264 ERCP REMOVE DUCT CALCULI: CPT

## 2023-01-19 PROCEDURE — C1889: CPT

## 2023-01-19 PROCEDURE — 74330 X-RAY BILE/PANC ENDOSCOPY: CPT

## 2023-01-19 PROCEDURE — C1769: CPT

## 2023-01-19 PROCEDURE — 43262 ENDO CHOLANGIOPANCREATOGRAPH: CPT | Mod: 59

## 2023-01-19 PROCEDURE — 43262 ENDO CHOLANGIOPANCREATOGRAPH: CPT

## 2023-01-19 PROCEDURE — 74328 X-RAY BILE DUCT ENDOSCOPY: CPT | Mod: 26

## 2023-01-19 DEVICE — BLLN EXTRCTR PRO RX-S 9-12MM: Type: IMPLANTABLE DEVICE | Status: FUNCTIONAL

## 2023-01-19 DEVICE — HYDRATOME 44: Type: IMPLANTABLE DEVICE | Status: FUNCTIONAL

## 2023-03-30 ENCOUNTER — APPOINTMENT (OUTPATIENT)
Dept: THORACIC SURGERY | Facility: CLINIC | Age: 78
End: 2023-03-30
Payer: MEDICARE

## 2023-03-30 DIAGNOSIS — Z85.118 ENCOUNTER FOR FOLLOW-UP EXAMINATION AFTER COMPLETED TREATMENT FOR MALIGNANT NEOPLASM: ICD-10-CM

## 2023-03-30 DIAGNOSIS — Z08 ENCOUNTER FOR FOLLOW-UP EXAMINATION AFTER COMPLETED TREATMENT FOR MALIGNANT NEOPLASM: ICD-10-CM

## 2023-03-30 PROCEDURE — 99443: CPT

## 2023-03-31 NOTE — CONSULT LETTER
[Dear  ___] : Dear  [unfilled], [Courtesy Letter:] : I had the pleasure of seeing your patient, [unfilled], in my office today. [( Thank you for referring [unfilled] for consultation for _____ )] : Thank you for referring [unfilled] for consultation for [unfilled] [Please see my note below.] : Please see my note below. [Consult Closing:] : Thank you very much for allowing me to participate in the care of this patient.  If you have any questions, please do not hesitate to contact me. [Sincerely,] : Sincerely, [FreeTextEntry2] : Paul Jackman MD (Hem/Onc/Referring)\par Dionicio Jackman MD (PCP)\par Preet Garcia MD (Pul)  [FreeTextEntry3] : Aiden Maier MD, MPH \par System Director of Thoracic Surgery \par Director of Comprehensive Lung and Foregut [place] \par Professor Cardiovascular & Thoracic Surgery  \par [facility] [employer] [facility]\par \par [facility]\par [address]\par Oncology [address]\[place] [address]\par Tel: [phone]\par Fax: [phone]\par

## 2023-03-31 NOTE — HISTORY OF PRESENT ILLNESS
[Home] : at home, [unfilled] , at the time of the visit. [Verbal consent obtained from patient] : the patient, [unfilled] [Medical Office: ([facility])___] : at the medical office located in  [FreeTextEntry1] : 78 y/o M, never smoker, w/ hx of HLD, DALIA on CPAP at night, impaired fasting glucose (diet control), personal hx of TB at age 18 (treated for 18 months in [place]), and lung cancer. \par \par Now 5 yr s/p FB, Navigational bronch, FNA of RLL, Rt VATS Robotic-assisted, RLL wedge rxn, RLLobectomy, MLND on 2/28/18. 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 BAL of RLL +AdenoCA. BAL of ALEXANDREA negative for malignancy.\par Post-op complicated w/ bronchospasm, subcutaneous emphysema, pneumonia and A-fib (treated w/ Cardizem).\par \par CT Chest on 8/14/2020:\par - a stable 5mm ALEXANDREA subpleural nodule (3:40)\par - a stable 3mm RUL nodule \par - multiple stable subpleural nodules in the LLL: a 4mm (3:80), a 4mm (3:96)\par - JONI \par \par CT chest on 2/20/2021:\par - postop changes to the left lung with patchy scarring, focal bronchiectasis medial ALEXANDREA, JONI. \par -stable 5 mm ALEXANDREA GGO (3:35), 3 mm RUL nodule (3:26), 4 mm LLL nodule (3:74). \par - mild cardiomegaly. \par \par CT Chest on 3/3/22:\par - post-op changes\par - stable 5mm ALEXANDREA ggo (3:33)\par - stable 4mm LLL subpleural solid nodule (3:72)\par - stable 4mm LLL subpleural nodule (3:89)\par \par CT Chest on 3/23/23:\par - Stable post op changes following prior RLLobectomy without evidence of recurrent or metastatic spread of disease within the thorax\par - Air within the gallbladder as well as pneumobilia, which are non specific findings\par \par Patient is followed today via Telehealth visit-1 yr f/u. He feels well, denies SOB, cough, hemoptysis.

## 2023-03-31 NOTE — ASSESSMENT
[FreeTextEntry1] : 76 y/o M, never smoker, w/ hx of HLD, DALIA on CPAP at night, impaired fasting glucose (diet control), personal hx of TB at age 18 (treated for 18 months in [place]), and lung cancer. \par \par Now 5 yr s/p FB, Navigational bronch, FNA of RLL, Rt VATS Robotic-assisted, RLL wedge rxn, RLLobectomy, MLND on 2/28/18. 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 BAL of RLL +AdenoCA. BAL of ALEXANDREA negative for malignancy.\par Post-op complicated w/ bronchospasm, subcutaneous emphysema, pneumonia and A-fib (treated w/ Cardizem).\par \par CT Chest on 3/3/22:\par - post-op changes\par - stable 5mm ALEXANDREA ggo (3:33)\par - stable 4mm LLL subpleural solid nodule (3:72)\par - stable 4mm LLL subpleural nodule (3:89)\par \par CT Chest on 3/23/23:\par - Stable post op changes following prior RLLobectomy without evidence of recurrent or metastatic spread of disease within the thorax\par - Air within the gallbladder as well as pneumobilia, which are non specific findings.\par \par I have reviewed the patient's medical records and diagnostic images at time of this office consultation and have made the following recommendation:\par 1. CT chest of 3/23/23 reviewed with pt today. \par 2. RTC in CT chest in 1 year. \par \par \par I, YAHIR Sterling, personally performed the evaluation and management (E/M) services for this established patient who follow up today with an existing condition.  That E/M includes conducting the examination, assessing all new/exacerbated/existing conditions, and establishing a plan of care. Today, my ACP, Ana Tristan NP, was here to observe my evaluation and management services for this existing condition to be followed going forward.\par \par

## 2023-05-16 ENCOUNTER — APPOINTMENT (OUTPATIENT)
Dept: HEPATOLOGY | Facility: CLINIC | Age: 78
End: 2023-05-16

## 2023-09-22 NOTE — DIETITIAN INITIAL EVALUATION ADULT. - NS AS NUTRI DX BIOCHEMICAL
Skin care plans:  1-Hydraguard to bilateral sacrum, buttock and heels BID and PRN  2-Elevate heels to offload pressure. 3-Ehob cushion in chair when out of bed. 4-Moisturize skin daily with skin nourishing cream.  5-Turn/reposition q2h or when medically stable for pressure re-distribution on skin.
Altered nutrition - related laboratory values (specify)/Hemoglobin A1C, Serum Glucose , Mag., and Ca.

## 2023-11-01 ENCOUNTER — NON-APPOINTMENT (OUTPATIENT)
Age: 78
End: 2023-11-01

## 2023-11-01 ENCOUNTER — EMERGENCY (EMERGENCY)
Facility: HOSPITAL | Age: 78
LOS: 1 days | Discharge: HOME | End: 2023-11-01
Attending: EMERGENCY MEDICINE | Admitting: EMERGENCY MEDICINE
Payer: COMMERCIAL

## 2023-11-01 VITALS
HEART RATE: 75 BPM | RESPIRATION RATE: 16 BRPM | DIASTOLIC BLOOD PRESSURE: 85 MMHG | WEIGHT: 143.3 LBS | TEMPERATURE: 98 F | OXYGEN SATURATION: 96 % | HEIGHT: 65 IN | SYSTOLIC BLOOD PRESSURE: 127 MMHG

## 2023-11-01 VITALS
SYSTOLIC BLOOD PRESSURE: 120 MMHG | TEMPERATURE: 98 F | OXYGEN SATURATION: 98 % | HEART RATE: 74 BPM | DIASTOLIC BLOOD PRESSURE: 70 MMHG | RESPIRATION RATE: 16 BRPM

## 2023-11-01 DIAGNOSIS — Z98.890 OTHER SPECIFIED POSTPROCEDURAL STATES: Chronic | ICD-10-CM

## 2023-11-01 DIAGNOSIS — Z92.89 PERSONAL HISTORY OF OTHER MEDICAL TREATMENT: Chronic | ICD-10-CM

## 2023-11-01 DIAGNOSIS — Z98.49 CATARACT EXTRACTION STATUS, UNSPECIFIED EYE: Chronic | ICD-10-CM

## 2023-11-01 LAB
ALBUMIN SERPL ELPH-MCNC: 3.8 G/DL — SIGNIFICANT CHANGE UP (ref 3.3–5)
ALBUMIN SERPL ELPH-MCNC: 3.8 G/DL — SIGNIFICANT CHANGE UP (ref 3.3–5)
ALP SERPL-CCNC: 76 U/L — SIGNIFICANT CHANGE UP (ref 30–120)
ALP SERPL-CCNC: 76 U/L — SIGNIFICANT CHANGE UP (ref 30–120)
ALT FLD-CCNC: 61 U/L — HIGH (ref 10–60)
ALT FLD-CCNC: 61 U/L — HIGH (ref 10–60)
ANION GAP SERPL CALC-SCNC: 8 MMOL/L — SIGNIFICANT CHANGE UP (ref 5–17)
ANION GAP SERPL CALC-SCNC: 8 MMOL/L — SIGNIFICANT CHANGE UP (ref 5–17)
APPEARANCE UR: ABNORMAL
APPEARANCE UR: ABNORMAL
AST SERPL-CCNC: 38 U/L — SIGNIFICANT CHANGE UP (ref 10–40)
AST SERPL-CCNC: 38 U/L — SIGNIFICANT CHANGE UP (ref 10–40)
BASOPHILS # BLD AUTO: 0.03 K/UL — SIGNIFICANT CHANGE UP (ref 0–0.2)
BASOPHILS # BLD AUTO: 0.03 K/UL — SIGNIFICANT CHANGE UP (ref 0–0.2)
BASOPHILS NFR BLD AUTO: 0.4 % — SIGNIFICANT CHANGE UP (ref 0–2)
BASOPHILS NFR BLD AUTO: 0.4 % — SIGNIFICANT CHANGE UP (ref 0–2)
BILIRUB SERPL-MCNC: 0.5 MG/DL — SIGNIFICANT CHANGE UP (ref 0.2–1.2)
BILIRUB SERPL-MCNC: 0.5 MG/DL — SIGNIFICANT CHANGE UP (ref 0.2–1.2)
BILIRUB UR-MCNC: ABNORMAL
BILIRUB UR-MCNC: ABNORMAL
BUN SERPL-MCNC: 21 MG/DL — SIGNIFICANT CHANGE UP (ref 7–23)
BUN SERPL-MCNC: 21 MG/DL — SIGNIFICANT CHANGE UP (ref 7–23)
CALCIUM SERPL-MCNC: 9.3 MG/DL — SIGNIFICANT CHANGE UP (ref 8.4–10.5)
CALCIUM SERPL-MCNC: 9.3 MG/DL — SIGNIFICANT CHANGE UP (ref 8.4–10.5)
CHLORIDE SERPL-SCNC: 104 MMOL/L — SIGNIFICANT CHANGE UP (ref 96–108)
CHLORIDE SERPL-SCNC: 104 MMOL/L — SIGNIFICANT CHANGE UP (ref 96–108)
CO2 SERPL-SCNC: 28 MMOL/L — SIGNIFICANT CHANGE UP (ref 22–31)
CO2 SERPL-SCNC: 28 MMOL/L — SIGNIFICANT CHANGE UP (ref 22–31)
COLOR SPEC: SIGNIFICANT CHANGE UP
COLOR SPEC: SIGNIFICANT CHANGE UP
CREAT SERPL-MCNC: 1.05 MG/DL — SIGNIFICANT CHANGE UP (ref 0.5–1.3)
CREAT SERPL-MCNC: 1.05 MG/DL — SIGNIFICANT CHANGE UP (ref 0.5–1.3)
DIFF PNL FLD: ABNORMAL
DIFF PNL FLD: ABNORMAL
EGFR: 73 ML/MIN/1.73M2 — SIGNIFICANT CHANGE UP
EGFR: 73 ML/MIN/1.73M2 — SIGNIFICANT CHANGE UP
EOSINOPHIL # BLD AUTO: 0.46 K/UL — SIGNIFICANT CHANGE UP (ref 0–0.5)
EOSINOPHIL # BLD AUTO: 0.46 K/UL — SIGNIFICANT CHANGE UP (ref 0–0.5)
EOSINOPHIL NFR BLD AUTO: 6.4 % — HIGH (ref 0–6)
EOSINOPHIL NFR BLD AUTO: 6.4 % — HIGH (ref 0–6)
GLUCOSE SERPL-MCNC: 129 MG/DL — HIGH (ref 70–99)
GLUCOSE SERPL-MCNC: 129 MG/DL — HIGH (ref 70–99)
GLUCOSE UR QL: NEGATIVE MG/DL — SIGNIFICANT CHANGE UP
GLUCOSE UR QL: NEGATIVE MG/DL — SIGNIFICANT CHANGE UP
HCT VFR BLD CALC: 49.1 % — SIGNIFICANT CHANGE UP (ref 39–50)
HCT VFR BLD CALC: 49.1 % — SIGNIFICANT CHANGE UP (ref 39–50)
HGB BLD-MCNC: 15.6 G/DL — SIGNIFICANT CHANGE UP (ref 13–17)
HGB BLD-MCNC: 15.6 G/DL — SIGNIFICANT CHANGE UP (ref 13–17)
IMM GRANULOCYTES NFR BLD AUTO: 0.3 % — SIGNIFICANT CHANGE UP (ref 0–0.9)
IMM GRANULOCYTES NFR BLD AUTO: 0.3 % — SIGNIFICANT CHANGE UP (ref 0–0.9)
KETONES UR-MCNC: NEGATIVE MG/DL — SIGNIFICANT CHANGE UP
KETONES UR-MCNC: NEGATIVE MG/DL — SIGNIFICANT CHANGE UP
LEUKOCYTE ESTERASE UR-ACNC: ABNORMAL
LEUKOCYTE ESTERASE UR-ACNC: ABNORMAL
LYMPHOCYTES # BLD AUTO: 1.8 K/UL — SIGNIFICANT CHANGE UP (ref 1–3.3)
LYMPHOCYTES # BLD AUTO: 1.8 K/UL — SIGNIFICANT CHANGE UP (ref 1–3.3)
LYMPHOCYTES # BLD AUTO: 25 % — SIGNIFICANT CHANGE UP (ref 13–44)
LYMPHOCYTES # BLD AUTO: 25 % — SIGNIFICANT CHANGE UP (ref 13–44)
MCHC RBC-ENTMCNC: 28.8 PG — SIGNIFICANT CHANGE UP (ref 27–34)
MCHC RBC-ENTMCNC: 28.8 PG — SIGNIFICANT CHANGE UP (ref 27–34)
MCHC RBC-ENTMCNC: 31.8 GM/DL — LOW (ref 32–36)
MCHC RBC-ENTMCNC: 31.8 GM/DL — LOW (ref 32–36)
MCV RBC AUTO: 90.8 FL — SIGNIFICANT CHANGE UP (ref 80–100)
MCV RBC AUTO: 90.8 FL — SIGNIFICANT CHANGE UP (ref 80–100)
MONOCYTES # BLD AUTO: 0.57 K/UL — SIGNIFICANT CHANGE UP (ref 0–0.9)
MONOCYTES # BLD AUTO: 0.57 K/UL — SIGNIFICANT CHANGE UP (ref 0–0.9)
MONOCYTES NFR BLD AUTO: 7.9 % — SIGNIFICANT CHANGE UP (ref 2–14)
MONOCYTES NFR BLD AUTO: 7.9 % — SIGNIFICANT CHANGE UP (ref 2–14)
NEUTROPHILS # BLD AUTO: 4.32 K/UL — SIGNIFICANT CHANGE UP (ref 1.8–7.4)
NEUTROPHILS # BLD AUTO: 4.32 K/UL — SIGNIFICANT CHANGE UP (ref 1.8–7.4)
NEUTROPHILS NFR BLD AUTO: 60 % — SIGNIFICANT CHANGE UP (ref 43–77)
NEUTROPHILS NFR BLD AUTO: 60 % — SIGNIFICANT CHANGE UP (ref 43–77)
NITRITE UR-MCNC: NEGATIVE — SIGNIFICANT CHANGE UP
NITRITE UR-MCNC: NEGATIVE — SIGNIFICANT CHANGE UP
NRBC # BLD: 0 /100 WBCS — SIGNIFICANT CHANGE UP (ref 0–0)
NRBC # BLD: 0 /100 WBCS — SIGNIFICANT CHANGE UP (ref 0–0)
PH UR: 5.5 — SIGNIFICANT CHANGE UP (ref 5–8)
PH UR: 5.5 — SIGNIFICANT CHANGE UP (ref 5–8)
PLATELET # BLD AUTO: 182 K/UL — SIGNIFICANT CHANGE UP (ref 150–400)
PLATELET # BLD AUTO: 182 K/UL — SIGNIFICANT CHANGE UP (ref 150–400)
POTASSIUM SERPL-MCNC: 4 MMOL/L — SIGNIFICANT CHANGE UP (ref 3.5–5.3)
POTASSIUM SERPL-MCNC: 4 MMOL/L — SIGNIFICANT CHANGE UP (ref 3.5–5.3)
POTASSIUM SERPL-SCNC: 4 MMOL/L — SIGNIFICANT CHANGE UP (ref 3.5–5.3)
POTASSIUM SERPL-SCNC: 4 MMOL/L — SIGNIFICANT CHANGE UP (ref 3.5–5.3)
PROT SERPL-MCNC: 8.4 G/DL — HIGH (ref 6–8.3)
PROT SERPL-MCNC: 8.4 G/DL — HIGH (ref 6–8.3)
PROT UR-MCNC: 100 MG/DL
PROT UR-MCNC: 100 MG/DL
RBC # BLD: 5.41 M/UL — SIGNIFICANT CHANGE UP (ref 4.2–5.8)
RBC # BLD: 5.41 M/UL — SIGNIFICANT CHANGE UP (ref 4.2–5.8)
RBC # FLD: 16.5 % — HIGH (ref 10.3–14.5)
RBC # FLD: 16.5 % — HIGH (ref 10.3–14.5)
SODIUM SERPL-SCNC: 140 MMOL/L — SIGNIFICANT CHANGE UP (ref 135–145)
SODIUM SERPL-SCNC: 140 MMOL/L — SIGNIFICANT CHANGE UP (ref 135–145)
SP GR SPEC: 1.02 — SIGNIFICANT CHANGE UP (ref 1–1.03)
SP GR SPEC: 1.02 — SIGNIFICANT CHANGE UP (ref 1–1.03)
UROBILINOGEN FLD QL: 0.2 MG/DL — SIGNIFICANT CHANGE UP (ref 0.2–1)
UROBILINOGEN FLD QL: 0.2 MG/DL — SIGNIFICANT CHANGE UP (ref 0.2–1)
WBC # BLD: 7.2 K/UL — SIGNIFICANT CHANGE UP (ref 3.8–10.5)
WBC # BLD: 7.2 K/UL — SIGNIFICANT CHANGE UP (ref 3.8–10.5)
WBC # FLD AUTO: 7.2 K/UL — SIGNIFICANT CHANGE UP (ref 3.8–10.5)
WBC # FLD AUTO: 7.2 K/UL — SIGNIFICANT CHANGE UP (ref 3.8–10.5)

## 2023-11-01 PROCEDURE — 36415 COLL VENOUS BLD VENIPUNCTURE: CPT

## 2023-11-01 PROCEDURE — 99284 EMERGENCY DEPT VISIT MOD MDM: CPT | Mod: 25

## 2023-11-01 PROCEDURE — 87086 URINE CULTURE/COLONY COUNT: CPT

## 2023-11-01 PROCEDURE — 81001 URINALYSIS AUTO W/SCOPE: CPT

## 2023-11-01 PROCEDURE — 80053 COMPREHEN METABOLIC PANEL: CPT

## 2023-11-01 PROCEDURE — 85025 COMPLETE CBC W/AUTO DIFF WBC: CPT

## 2023-11-01 PROCEDURE — 96360 HYDRATION IV INFUSION INIT: CPT

## 2023-11-01 PROCEDURE — 74176 CT ABD & PELVIS W/O CONTRAST: CPT | Mod: 26,MA

## 2023-11-01 PROCEDURE — 96361 HYDRATE IV INFUSION ADD-ON: CPT

## 2023-11-01 PROCEDURE — 99284 EMERGENCY DEPT VISIT MOD MDM: CPT

## 2023-11-01 PROCEDURE — 74176 CT ABD & PELVIS W/O CONTRAST: CPT | Mod: MA

## 2023-11-01 RX ORDER — CEFUROXIME AXETIL 250 MG
1 TABLET ORAL
Qty: 12 | Refills: 0
Start: 2023-11-01 | End: 2023-11-06

## 2023-11-01 RX ORDER — CEFUROXIME AXETIL 250 MG
500 TABLET ORAL ONCE
Refills: 0 | Status: COMPLETED | OUTPATIENT
Start: 2023-11-01 | End: 2023-11-01

## 2023-11-01 RX ORDER — SODIUM CHLORIDE 9 MG/ML
1000 INJECTION INTRAMUSCULAR; INTRAVENOUS; SUBCUTANEOUS ONCE
Refills: 0 | Status: COMPLETED | OUTPATIENT
Start: 2023-11-01 | End: 2023-11-01

## 2023-11-01 RX ADMIN — SODIUM CHLORIDE 1000 MILLILITER(S): 9 INJECTION INTRAMUSCULAR; INTRAVENOUS; SUBCUTANEOUS at 19:10

## 2023-11-01 RX ADMIN — SODIUM CHLORIDE 1000 MILLILITER(S): 9 INJECTION INTRAMUSCULAR; INTRAVENOUS; SUBCUTANEOUS at 20:57

## 2023-11-01 RX ADMIN — Medication 500 MILLIGRAM(S): at 20:40

## 2023-11-01 NOTE — ED PROVIDER NOTE - CARE PROVIDERS DIRECT ADDRESSES
[email or web address]

## 2023-11-01 NOTE — ED PROVIDER NOTE - PROGRESS NOTE DETAILS
Reevaluated patient at bedside. Discussed the results of all diagnostic testing in ED and copies of all reports given to son at bedside.  Advised will rx ceftin for possible prostatitis, increase fluids and fu urologist. strict return precautions given. An opportunity to ask questions was given.  Discussed the importance of prompt, close medical follow-up.  Patient will return with any changes, concerns or persistent / worsening symptoms.  Understanding of all instructions verbalized.

## 2023-11-01 NOTE — ED ADULT TRIAGE NOTE - PAIN RATING/NUMBER SCALE (0-10): ACTIVITY
0 (no pain/absence of nonverbal indicators of pain) Rifampin Counseling: I discussed with the patient the risks of rifampin including but not limited to liver damage, kidney damage, red-orange body fluids, nausea/vomiting and severe allergy.

## 2023-11-01 NOTE — ED PROVIDER NOTE - NSFOLLOWUPINSTRUCTIONS_ED_ALL_ED_FT
Hematuria, Adult    Hematuria is blood in the urine. Blood may be visible in the urine, or it may be identified with a test. This condition can be caused by infections of the bladder, urethra, kidney, or prostate. Other possible causes include:  Kidney stones.  Cancer of the urinary tract.  Too much calcium in the urine.  Conditions that are passed from parent to child (inherited conditions).  Exercise that requires a lot of energy.  Infections can usually be treated with medicine, and a kidney stone usually will pass through your urine. If neither of these is the cause of your hematuria, more tests may be needed to identify the cause of your symptoms.    It is very important to tell your health care provider about any blood in your urine, even if it is painless or the blood stops without treatment. Blood in the urine, when it happens and then stops and then happens again, can be a symptom of a very serious condition, including cancer. There is no pain in the initial stages of many urinary cancers.    Follow these instructions at home:  Medicines    Take over-the-counter and prescription medicines only as told by your health care provider.  If you were prescribed an antibiotic medicine, take it as told by your health care provider. Do not stop taking the antibiotic even if you start to feel better.  Eating and drinking    Drink enough fluid to keep your urine pale yellow. It is recommended that you drink 3–4 quarts (2.8–3.8 L) a day. If you have been diagnosed with an infection, drinking cranberry juice in addition to large amounts of water is recommended.  Avoid caffeine, tea, and carbonated beverages. These tend to irritate the bladder.  Avoid alcohol because it may irritate the prostate (in males).  General instructions    If you have been diagnosed with a kidney stone, follow your health care provider's instructions about straining your urine to catch the stone.  Empty your bladder often. Avoid holding urine for long periods of time.  If you are female:  After a bowel movement, wipe from front to back and use each piece of toilet paper only once.  Empty your bladder before and after sex.  Pay attention to any changes in your symptoms. Tell your health care provider about any changes or any new symptoms.  It is up to you to get the results of any tests. Ask your health care provider, or the department that is doing the test, when your results will be ready.  Keep all follow-up visits. This is important.  Contact a health care provider if:  You develop back pain.  You have a fever or chills.  You have nausea or vomiting.  Your symptoms do not improve after 3 days.  Your symptoms get worse.  Get help right away if:  You develop severe vomiting and are unable to take medicine without vomiting.  You develop severe pain in your back or abdomen even though you are taking medicine.  You pass a large amount of blood in your urine.  You pass blood clots in your urine.  You feel very weak or like you might faint.  You faint.  Summary  Hematuria is blood in the urine. It has many possible causes.  It is very important that you tell your health care provider about any blood in your urine, even if it is painless or the blood stops without treatment.  Take over-the-counter and prescription medicines only as told by your health care provider.  Drink enough fluid to keep your urine pale yellow.  This information is not intended to replace advice given to you by your health care provider. Make sure you discuss any questions you have with your health care provider. Follow up with urologist for re-evaluation, ongoing care and treatment. Stay hydrated. Take full course of antibiotics as prescribed. If having worsening of symptoms, fever, vomiting, abdominal/flank pain or other related symptoms, RETURN TO THE ER IMMEDIATELY.     Hematuria, Adult    Hematuria is blood in the urine. Blood may be visible in the urine, or it may be identified with a test. This condition can be caused by infections of the bladder, urethra, kidney, or prostate. Other possible causes include:  Kidney stones.  Cancer of the urinary tract.  Too much calcium in the urine.  Conditions that are passed from parent to child (inherited conditions).  Exercise that requires a lot of energy.  Infections can usually be treated with medicine, and a kidney stone usually will pass through your urine. If neither of these is the cause of your hematuria, more tests may be needed to identify the cause of your symptoms.    It is very important to tell your health care provider about any blood in your urine, even if it is painless or the blood stops without treatment. Blood in the urine, when it happens and then stops and then happens again, can be a symptom of a very serious condition, including cancer. There is no pain in the initial stages of many urinary cancers.    Follow these instructions at home:  Medicines    Take over-the-counter and prescription medicines only as told by your health care provider.  If you were prescribed an antibiotic medicine, take it as told by your health care provider. Do not stop taking the antibiotic even if you start to feel better.  Eating and drinking    Drink enough fluid to keep your urine pale yellow. It is recommended that you drink 3–4 quarts (2.8–3.8 L) a day. If you have been diagnosed with an infection, drinking cranberry juice in addition to large amounts of water is recommended.  Avoid caffeine, tea, and carbonated beverages. These tend to irritate the bladder.  Avoid alcohol because it may irritate the prostate (in males).  General instructions    If you have been diagnosed with a kidney stone, follow your health care provider's instructions about straining your urine to catch the stone.  Empty your bladder often. Avoid holding urine for long periods of time.  If you are female:  After a bowel movement, wipe from front to back and use each piece of toilet paper only once.  Empty your bladder before and after sex.  Pay attention to any changes in your symptoms. Tell your health care provider about any changes or any new symptoms.  It is up to you to get the results of any tests. Ask your health care provider, or the department that is doing the test, when your results will be ready.  Keep all follow-up visits. This is important.  Contact a health care provider if:  You develop back pain.  You have a fever or chills.  You have nausea or vomiting.  Your symptoms do not improve after 3 days.  Your symptoms get worse.  Get help right away if:  You develop severe vomiting and are unable to take medicine without vomiting.  You develop severe pain in your back or abdomen even though you are taking medicine.  You pass a large amount of blood in your urine.  You pass blood clots in your urine.  You feel very weak or like you might faint.  You faint.  Summary  Hematuria is blood in the urine. It has many possible causes.  It is very important that you tell your health care provider about any blood in your urine, even if it is painless or the blood stops without treatment.  Take over-the-counter and prescription medicines only as told by your health care provider.  Drink enough fluid to keep your urine pale yellow.  This information is not intended to replace advice given to you by your health care provider. Make sure you discuss any questions you have with your health care provider.

## 2023-11-01 NOTE — ED PROVIDER NOTE - IV ALTEPLASE DOOR HIDDEN
show Continue Regimen: Spironolactone and tretinoin Render In Strict Bullet Format?: No Detail Level: Zone

## 2023-11-01 NOTE — ED PROVIDER NOTE - OBJECTIVE STATEMENT
78-year-old male with history of asthma, hypertension, hyperlipidemia, BPH with prior prostate surgery, kidney stone presents with complaint of hematuria since yesterday.  States that patient has had blood in urine with some blood clots since yesterday.  Denies fever, chills, nausea, vomiting, abdominal pain, back pain, dysuria, use of anticoagulants, trauma, weakness or other symptoms.  PCP/urologist in [place]. 78-year-old male with history of asthma, hypertension, hyperlipidemia, BPH with prior prostate surgery, kidney stone, DM presents with complaint of hematuria since yesterday.  States that patient has had blood in urine with some blood clots since yesterday.  Denies fever, chills, nausea, vomiting, abdominal pain, back pain, dysuria, use of anticoagulants, trauma, weakness or other symptoms.  PCP/urologist in [place]. 78-year-old male with history of asthma, hypertension, hyperlipidemia, BPH with prior prostate surgery, kidney stone, DM presents with complaint of hematuria since yesterday.  States that patient has had blood in urine with some blood clots since yesterday. admits to slow urine stream.  Denies fever, chills, nausea, vomiting, abdominal pain, back pain, dysuria, use of anticoagulants, trauma, weakness or other symptoms.  PCP/urologist in [place].

## 2023-11-01 NOTE — ED PROVIDER NOTE - PROVIDER TOKENS
PROVIDER:[TOKEN:[[id number]],FOLLOWUP:[1-3 Days]] PROVIDER:[TOKEN:[[id number]],FOLLOWUP:[1-3 Days]],FREE:[LAST:[YOUR UROLOGIST],PHONE:[(   )    -],FAX:[(   )    -],FOLLOWUP:[1-3 Days]]

## 2023-11-01 NOTE — ED ADULT TRIAGE NOTE - CHIEF COMPLAINT QUOTE
" I have blood and blood clots in my urine started yesterday. " Pt denies any pain or dysuria No blood thinners

## 2023-11-01 NOTE — ED PROVIDER NOTE - CARE PROVIDER_API CALL
Jonatan Grace  Urology  [address], [address]  [place] [address]  Phone: [phone]  Fax: [phone]  Follow Up Time: 1-3 Days   Jonatan Grace  Urology  [address], [address]  [place] [address]  Phone: [phone]  Fax: [phone]  Follow Up Time: 1-3 Days    YOUR UROLOGIST,   Phone: (   )    -  Fax: (   )    -  Follow Up Time: 1-3 Days

## 2023-11-01 NOTE — ED PROVIDER NOTE - NSICDXPASTMEDICALHX_GEN_ALL_CORE_FT
PAST MEDICAL HISTORY:  Asthma due to environmental allergies mild; rarely uses rescue inhaler; On Breo-Ellipta daily    BPH (Benign Prostatic Hyperplasia) with LUTS s/p prostate laser greenlight procedure (2013)    Elevated triglycerides with high cholesterol on Vascepa    Fatty liver Monitored by PCP    Malignant neoplasm of lower lobe of right lung s/p Right lobectomy (2018) No chemo or radiation    DALIA on CPAP     Renal calculi passed stone in 2005; Pending Right Ureteroscopy, Laser Lithotripsy, Stone Extraction and Stent Placement with Dr. Hoyos on 5/18/22    TB (tuberculosis) Treated with meds x 1 year at age 20    Type 2 diabetes mellitus on metformin daily 500 mg XR

## 2023-11-01 NOTE — ED PROVIDER NOTE - NSICDXPASTSURGICALHX_GEN_ALL_CORE_FT
PAST SURGICAL HISTORY:  Foot fracture, left around 1990 s/p Left foot ORIF    H/O cardiac radiofrequency ablation s/p successful cardiac ablation for A-Fib (2019); Eliquis D/RICARDO'ed 6 months later - currently has a LINQ recorder due to be removed within the next 6 months    H/O left inguinal hernia repair unsure of when    History of cardiac monitoring LINQ recorder inserted in 2019 for history of A-Fib    History of prostate surgery greenlight laser (2013)    Status post cataract extraction B/L (2018) with IOL Implant

## 2023-11-01 NOTE — ED PROVIDER NOTE - CLINICAL SUMMARY MEDICAL DECISION MAKING FREE TEXT BOX
78-year-old male with history of BPH and prior prostate surgery complaining of hematuria since yesterday.  States he passed some blood and clots today.  His urologist is in [place].  Denies fever pain nausea vomiting dysuria diarrhea melena or other symptom.  Takes no blood thinners.  Physical exam vital signs stable afebrile no distress.  Heart and lungs normal.  Abdomen soft nontender.  There is no suprapubic tenderness there is no CVA tenderness.  Extremities full range of motion intact no edema.  Impression is hematuria rule out UTI rule out kidney stone.  Plan is labs urine and consider CT stone hunt.  If ER work-up stable can follow-up with urology outpatient.  If bleeding continues or worsens may need CBI and admission or transfer for urologic evaluation.

## 2023-11-01 NOTE — ED PROVIDER NOTE - PATIENT PORTAL LINK FT
You can access the FollowMyHealth Patient Portal offered by [facility] by registering at the following website: [email or web address]. By joining [facility]’s FollowMyHealth portal, you will also be able to view your health information using other applications (apps) compatible with our system.

## 2023-11-07 ENCOUNTER — APPOINTMENT (OUTPATIENT)
Dept: UROLOGY | Facility: CLINIC | Age: 78
End: 2023-11-07

## 2023-11-07 ENCOUNTER — APPOINTMENT (OUTPATIENT)
Dept: UROLOGY | Facility: CLINIC | Age: 78
End: 2023-11-07
Payer: MEDICARE

## 2023-11-07 VITALS
WEIGHT: 147.2 LBS | DIASTOLIC BLOOD PRESSURE: 74 MMHG | HEART RATE: 80 BPM | RESPIRATION RATE: 18 BRPM | SYSTOLIC BLOOD PRESSURE: 114 MMHG | TEMPERATURE: 98 F | BODY MASS INDEX: 24.5 KG/M2 | OXYGEN SATURATION: 96 %

## 2023-11-07 PROCEDURE — 52000 CYSTOURETHROSCOPY: CPT

## 2023-11-07 PROCEDURE — 76775 US EXAM ABDO BACK WALL LIM: CPT

## 2023-11-08 LAB
APPEARANCE: CLEAR
BACTERIA: NEGATIVE /HPF
BILIRUBIN URINE: NEGATIVE
BLOOD URINE: NEGATIVE
CALCIUM OXALATE CRYSTALS: PRESENT
CAST: 0 /LPF
COLOR: YELLOW
EPITHELIAL CELLS: 1 /HPF
GLUCOSE QUALITATIVE U: NEGATIVE MG/DL
KETONES URINE: NEGATIVE MG/DL
LEUKOCYTE ESTERASE URINE: NEGATIVE
MICROSCOPIC-UA: NORMAL
NITRITE URINE: NEGATIVE
PH URINE: 5.5
PROTEIN URINE: NEGATIVE MG/DL
RED BLOOD CELLS URINE: 1 /HPF
REVIEW: NORMAL
SPECIFIC GRAVITY URINE: 1.02
URINE CYTOLOGY: NORMAL
UROBILINOGEN URINE: 0.2 MG/DL
WHITE BLOOD CELLS URINE: 1 /HPF

## 2024-03-28 ENCOUNTER — NON-APPOINTMENT (OUTPATIENT)
Age: 79
End: 2024-03-28

## 2024-03-29 ENCOUNTER — APPOINTMENT (OUTPATIENT)
Dept: THORACIC SURGERY | Facility: CLINIC | Age: 79
End: 2024-03-29
Payer: MEDICARE

## 2024-03-29 DIAGNOSIS — C34.31 MALIGNANT NEOPLASM OF LOWER LOBE, RIGHT BRONCHUS OR LUNG: ICD-10-CM

## 2024-03-29 PROCEDURE — 99442: CPT

## 2024-03-29 NOTE — ASSESSMENT
[FreeTextEntry1] : 77 y/o M, never smoker, w/ hx of HLD, DALIA on CPAP at night, impaired fasting glucose (diet control), personal hx of TB at age 18 (treated for 18 months in [place]), and lung cancer.   Now 6 yrs s/p FB, Navigational bronch, FNA of RLL, Rt VATS Robotic-assisted, RLL wedge rxn, RLLobectomy, MLND on 2/28/18. 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 BAL of RLL +AdenoCA. BAL of ALEXANDREA negative for malignancy. Post-op complicated w/ bronchospasm, subcutaneous emphysema, pneumonia and A-fib (treated w/ Cardizem).  [place] [facility] - subpleural lung nodules which are not significantly changed. For example There is a 5 mm subpleural nodule left lower lobe, unchanged. There is a stable subsolid 5 mm left upper lobe nodule. - stable post-treatment changes in the left lung with scarring and bronchiectasis. - No evidence of pleural effusion or pleural-based mass.   I have reviewed the patient's medical records and diagnostic images at time of this office consultation and have made the following recommendation: 1. CT scan reviewed by Dr. Aiden Maier, and it showed no evidence of recurrence, recommended patient to return to office in 1 yr w/ CT Chest w/o contrast.

## 2024-03-29 NOTE — REASON FOR VISIT
[Home] : at home, [unfilled] , at the time of the visit. [Medical Office: ([facility])___] : at the medical office located in  [Self] : self [This encounter was initiated by telehealth (audio with video) and converted to telephone (audio only) due to technical difficulties.] : This encounter was initiated by telehealth (audio with video) and converted to telephone (audio only) due to technical difficulties.

## 2024-03-29 NOTE — HISTORY OF PRESENT ILLNESS
[FreeTextEntry1] : 79 y/o M, never smoker, w/ hx of HLD, DALIA on CPAP at night, impaired fasting glucose (diet control), personal hx of TB at age 18 (treated for 18 months in [place]), and lung cancer.   Now 6 yrs s/p FB, Navigational bronch, FNA of RLL, Rt VATS Robotic-assisted, RLL wedge rxn, RLLobectomy, MLND on 2/28/18. 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 BAL of RLL +AdenoCA. BAL of ALEXANDREA negative for malignancy. Post-op complicated w/ bronchospasm, subcutaneous emphysema, pneumonia and A-fib (treated w/ Cardizem).  CT chest on 2/20/2021: - postop changes to the left lung with patchy scarring, focal bronchiectasis medial ALEXANDREA, JONI.  -stable 5 mm ALEXANDREA GGO (3:35), 3 mm RUL nodule (3:26), 4 mm LLL nodule (3:74).  - mild cardiomegaly.   CT Chest on 3/3/22: - post-op changes - stable 5mm ALEXANDREA ggo (3:33) - stable 4mm LLL subpleural solid nodule (3:72) - stable 4mm LLL subpleural nodule (3:89)  CT Chest on 3/23/23: - Stable post op changes following prior RLLobectomy without evidence of recurrent or metastatic spread of disease within the thorax - Air within the gallbladder as well as pneumobilia, which are non specific findings  CT Chest on 3/8/24 at [facility]: - subpleural lung nodules which are not significantly changed. For example There is a 5 mm subpleural nodule left lower lobe, unchanged. There is a stable subsolid 5 mm left upper lobe nodule. - stable post-treatment changes in the left lung with scarring and bronchiectasis. - No evidence of pleural effusion or pleural-based mass.  Patient is followed today via Telehealth visit. Admits to productive cough. Denies SOB, CP.

## 2024-03-29 NOTE — CONSULT LETTER
[FreeTextEntry2] : Paul Jackman MD (Hem/Onc/Referring)\par  Dionicio Jackman MD (PCP)\par  Preet Garcia MD (Pul)  [FreeTextEntry3] : Aiden Maier MD, MPH \par  System Director of Thoracic Surgery \par  Director of Comprehensive Lung and Foregut [place] \par  Professor Cardiovascular & Thoracic Surgery  \par  [facility] [employer] [facility]\par  \par  [facility]\par  [address]\par  Oncology [address]\[place] [address]\par  Tel: [phone]\par  Fax: [phone]\par

## 2024-05-10 ENCOUNTER — APPOINTMENT (OUTPATIENT)
Dept: UROLOGY | Facility: CLINIC | Age: 79
End: 2024-05-10
Payer: MEDICARE

## 2024-05-10 VITALS
WEIGHT: 147.5 LBS | DIASTOLIC BLOOD PRESSURE: 69 MMHG | OXYGEN SATURATION: 96 % | TEMPERATURE: 97.3 F | SYSTOLIC BLOOD PRESSURE: 116 MMHG | BODY MASS INDEX: 24.55 KG/M2 | RESPIRATION RATE: 18 BRPM | HEART RATE: 85 BPM

## 2024-05-10 DIAGNOSIS — N13.8 BENIGN PROSTATIC HYPERPLASIA WITH LOWER URINARY TRACT SYMPMS: ICD-10-CM

## 2024-05-10 DIAGNOSIS — N40.1 BENIGN PROSTATIC HYPERPLASIA WITH LOWER URINARY TRACT SYMPMS: ICD-10-CM

## 2024-05-10 DIAGNOSIS — R97.20 ELEVATED PROSTATE, SPECIFIC ANTIGEN [PSA]: ICD-10-CM

## 2024-05-10 PROCEDURE — G2211 COMPLEX E/M VISIT ADD ON: CPT

## 2024-05-10 PROCEDURE — 99214 OFFICE O/P EST MOD 30 MIN: CPT

## 2024-05-10 RX ORDER — FINASTERIDE 5 MG/1
5 TABLET, FILM COATED ORAL DAILY
Qty: 90 | Refills: 3 | Status: ACTIVE | COMMUNITY
Start: 2019-09-10 | End: 1900-01-01

## 2024-05-10 NOTE — LETTER BODY
[FreeTextEntry1] : [place] [address] [phone]  Dear Dr. Greenberg,  Reason for visit: BPH. Elevated PSA. Right kidney stone s/p right ureteroscopy.    This is a 78 year-old gentleman with elevated PSA and chronic BPH presenting with right kidney stone s/p right ureteroscopy. His CT of abdomen and pelvis in July 2021 demonstrated a 6 mm right renal stone. His CT of abdomen and pelvis in April 2022 demonstrated an 11 mm right renal stone. He underwent right ureteroscopy, laser lithotripsy, stone extraction, and stent placement on May 18. The patient returns today for follow up. Since he was last seen, the patient reports taking Proscar regularly as directed without any side effects or difficulties with the medication. He notes good uroflow and stable urinary symptoms on medical therapy. He denies any flank pain or hematuria. The patient denies any urinary retention or changes in health. The past medical history and family history and social history are unchanged. All other review of systems are negative. Patient denies any changes in medications. Medication list was reconciled.    On examination, the patient is a healthy-appearing gentleman in no acute distress. He is alert and oriented follows commands. He has normal mood and affect. He is normocephalic. Neck is supple. Oral no thrush Respirations are unlabored. His abdomen is soft and nontender. Bladder is nonpalpable. No CVA tenderness. Neurologically he is grossly intact. No peripheral edema. Skin without gross abnormality. He has normal male external genitalia. Normal meatus. Bilateral testes are descended intrascrotally and normal to palpation. On rectal examination, there is normal sphincter tone. The prostate is clinically benign without focal induration or nodularity.    His BMP in December 2022 demonstrated normal renal functions, creatinine 0.80. His PSA was 1.60, WNL.    Assessment: BPH, symptoms stable with Proscar. Elevated PSA. Right kidney stone s/p right ureteroscopy.    I counseled the patient. He is doing well. In terms of his BPH, I recommended the patient continue taking Proscar. I renewed the patient's prescription for Proscar today. I encouraged the patient to continue medications regularly as directed. I recommended he repeat BMP and PSA to ensure stability. In terms of his hematuria, I recommended he obtain urinalysis and urine cytology to evaluate for urothelial carcinoma. Risks and alternatives were discussed. I answered the patient questions. The patient will follow-up as directed and will contact me with any questions or concerns. Thank you for the opportunity to participate in the care of this patient, I will keep you updated on his progress.  Patient will continue longitudinal care for his complex and serious chronic condition.  Plan: Continue Proscar. PSA. BMP. Urinalysis. Urine cytology. Follow up in 1 year.  I spent 30-minutes time today on all issues related to this patient on today date of service including non face to face time.

## 2024-05-10 NOTE — ADDENDUM
[FreeTextEntry1] : Entered by India Lopez, acting as scribe for Dr. Matt Hoyos. The documentation recorded by the scribe accurately reflects the service I personally performed and the decisions made by me.

## 2024-05-11 LAB
ANION GAP SERPL CALC-SCNC: 12 MMOL/L
APPEARANCE: CLEAR
BACTERIA: NEGATIVE /HPF
BILIRUBIN URINE: NEGATIVE
BLOOD URINE: NEGATIVE
BUN SERPL-MCNC: 15 MG/DL
CALCIUM OXALATE CRYSTALS: PRESENT
CALCIUM SERPL-MCNC: 9 MG/DL
CAST: 0 /LPF
CHLORIDE SERPL-SCNC: 106 MMOL/L
CO2 SERPL-SCNC: 24 MMOL/L
COLOR: YELLOW
CREAT SERPL-MCNC: 0.78 MG/DL
EGFR: 91 ML/MIN/1.73M2
EPITHELIAL CELLS: 0 /HPF
GLUCOSE QUALITATIVE U: 250 MG/DL
GLUCOSE SERPL-MCNC: 110 MG/DL
KETONES URINE: NEGATIVE MG/DL
LEUKOCYTE ESTERASE URINE: NEGATIVE
MICROSCOPIC-UA: NORMAL
NITRITE URINE: NEGATIVE
PH URINE: 6.5
POTASSIUM SERPL-SCNC: 4 MMOL/L
PROTEIN URINE: NEGATIVE MG/DL
PSA FREE FLD-MCNC: 31 %
PSA FREE SERPL-MCNC: 0.37 NG/ML
PSA SERPL-MCNC: 1.19 NG/ML
RED BLOOD CELLS URINE: 1 /HPF
REVIEW: NORMAL
SODIUM SERPL-SCNC: 143 MMOL/L
SPECIFIC GRAVITY URINE: 1.02
UROBILINOGEN URINE: 1 MG/DL
WHITE BLOOD CELLS URINE: 1 /HPF

## 2024-05-15 LAB — URINE CYTOLOGY: NORMAL

## 2024-05-28 NOTE — ASU PREOP CHECKLIST - HAIR REMOVAL
Detail Level: Detailed Depth Of Biopsy: dermis Was A Bandage Applied: Yes Size Of Lesion In Cm: 0 Biopsy Type: H and E Biopsy Method: Dermablade Anesthesia Type: 1% lidocaine without epinephrine and a 1:10 solution of 8.4% sodium bicarbonate Anesthesia Volume In Cc: 0.5 Hemostasis: Electrocautery Wound Care: Petrolatum Dressing: bandage Destruction After The Procedure: No Type Of Destruction Used: Curettage Curettage Text: The wound bed was treated with curettage after the biopsy was performed. hair removal not indicated Cryotherapy Text: The wound bed was treated with cryotherapy after the biopsy was performed. Electrodesiccation Text: The wound bed was treated with electrodesiccation after the biopsy was performed. Electrodesiccation And Curettage Text: The wound bed was treated with electrodesiccation and curettage after the biopsy was performed. Silver Nitrate Text: The wound bed was treated with silver nitrate after the biopsy was performed. Lab: 48 Consent: Written consent was obtained and risks were reviewed including but not limited to scarring, infection, bleeding, scabbing, incomplete removal, nerve damage and allergy to anesthesia. Post-Care Instructions: I reviewed with the patient in detail post-care instructions. Patient is to keep the biopsy site dry overnight, and then apply bacitracin twice daily until healed. Patient may apply hydrogen peroxide soaks to remove any crusting. Notification Instructions: Patient will be notified of biopsy results. However, patient instructed to call the office if not contacted within 2 weeks. Billing Type: Third-Party Bill Information: Selecting Yes will display possible errors in your note based on the variables you have selected. This validation is only offered as a suggestion for you. PLEASE NOTE THAT THE VALIDATION TEXT WILL BE REMOVED WHEN YOU FINALIZE YOUR NOTE. IF YOU WANT TO FAX A PRELIMINARY NOTE YOU WILL NEED TO TOGGLE THIS TO 'NO' IF YOU DO NOT WANT IT IN YOUR FAXED NOTE.

## 2024-06-07 NOTE — ASU PATIENT PROFILE, ADULT - NS PRO PASSIVE SMOKE EXP
Spoke with mom  Patient was seen by Dr. Reyes yesterday for fever and ear tugging  Ears were clear and patient was diagnosed with viral illness  This morning patient woke with 103.1 fever and was very irritable, crying  Mom brought him to ER  ER doctor informed mom that ears were \"maybe a little red\" but not infected  He prescribed amoxicillin and advised mom to start it if symptoms worsened  Patient's temp right now is 99.2  He has calmed down since taking motrin    Mom would like to follow up with Dr. Varghese. She does not want to start giving amoxicillin unless directed to by Dr. Varghese.     Informed mom I will review with Dr. Varghese for recommendations. To SMITHA VARGHESE MD-please advise.    No

## 2024-06-19 NOTE — ASSESSMENT
Kari spoke with pt, name of  home is [facility].  Joaquinr notified Jose De Jesus  at [facility]. Jose De Jesus asked to be contacted once baby is ready to be released. Contact number is [phone].      Kari provided Mayda with [employer] name of  home and contact information. Mayda will reach out to  home to provide financial assistance. Authorization for release of confidential information form completed and placed into pt's folder.    [FreeTextEntry1] : 74 y/o M, never smoker, w/ hx of HLD, DALIA on CPAP at night, impaired fasting glucose (diet control), personal hx of TB at age 18 (treated for 18 months in [place]), and lung cancer. \par \par Now 2.5 yr s/p FB, Navigational bronch, FNA of RLL, Rt VATS Robotic-assisted, RLL wedge rxn, RLLobectomy, MLND on 2/28/18. 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 BAL of RLL +AdenoCA. BAL of ALEXANDREA negative for malignancy.\par Post-op complicated w/ bronchospasm, subcutaneous emphysema, pneumonia and A-fib (treated w/ Cardizem).\par \par CT Chest on 8/14/2020:\par - a stable 5mm ALEXANDREA subpleural nodule (3:40)\par - a stable 3mm RUL nodule \par - multiple stable subpleural nodules in the LLL: a 4mm (3:80), a 4mm (3:96)\par - OJNI \par \par I have reviewed the patient's medical records and diagnostic images at time of this office consultation and have made the following recommendation:\par 1. CT scan showed no evidence of recurrence, recommended patient to return to office in 6mo with CT Chest w/o contrast.\par \par \par I have spent 15 minutes on the phone discussing above result and plan of care with patient.\par \par I personally performed the services described in the documentation, reviewed the documentation recorded by the scribe in my presence and it accurately and completely records my words and actions. \par \par I, Esteban Che, PAYAM, am scribing for and the presence of Dr. Aiden Maier the following sections, HISTORY OF PRESENT ILLNESS, PAST MEDICAL/FAMILY/SOCIAL HISTORY; REVIEW OF SYSTEMS; VITAL SIGNS; PHYSICAL EXAM; DISPOSITION.\par

## 2025-03-13 NOTE — PRE-ANESTHESIA EVALUATION ADULT - NSANTHADDINFOFT_GEN_ALL_CORE
Risks, benefits and alternatives discussed; including but not limited to headache, nausea, bleeding, infection and local trauma/positioning related injury. All questions answered. The patient agrees to proceed.
18

## 2025-03-27 ENCOUNTER — APPOINTMENT (OUTPATIENT)
Dept: THORACIC SURGERY | Facility: CLINIC | Age: 80
End: 2025-03-27
Payer: MEDICARE

## 2025-03-27 ENCOUNTER — NON-APPOINTMENT (OUTPATIENT)
Age: 80
End: 2025-03-27

## 2025-03-27 DIAGNOSIS — C34.31 MALIGNANT NEOPLASM OF LOWER LOBE, RIGHT BRONCHUS OR LUNG: ICD-10-CM

## 2025-03-27 PROCEDURE — 99203 OFFICE O/P NEW LOW 30 MIN: CPT | Mod: 93

## 2025-03-28 ENCOUNTER — APPOINTMENT (OUTPATIENT)
Dept: UROLOGY | Facility: CLINIC | Age: 80
End: 2025-03-28
Payer: MEDICARE

## 2025-03-28 ENCOUNTER — NON-APPOINTMENT (OUTPATIENT)
Age: 80
End: 2025-03-28

## 2025-03-28 VITALS
WEIGHT: 150 LBS | HEART RATE: 74 BPM | SYSTOLIC BLOOD PRESSURE: 112 MMHG | OXYGEN SATURATION: 95 % | TEMPERATURE: 97.7 F | RESPIRATION RATE: 18 BRPM | DIASTOLIC BLOOD PRESSURE: 74 MMHG | BODY MASS INDEX: 24.96 KG/M2

## 2025-03-28 PROCEDURE — 99214 OFFICE O/P EST MOD 30 MIN: CPT | Mod: 25

## 2025-03-28 PROCEDURE — 51700 IRRIGATION OF BLADDER: CPT

## 2025-03-28 PROCEDURE — A4216: CPT | Mod: NC

## 2025-03-28 RX ORDER — CIPROFLOXACIN HYDROCHLORIDE 500 MG/1
500 TABLET, FILM COATED ORAL DAILY
Qty: 1 | Refills: 0 | Status: ACTIVE | COMMUNITY
Start: 2025-03-28

## 2025-03-28 RX ORDER — TAMSULOSIN HYDROCHLORIDE 0.4 MG/1
0.4 CAPSULE ORAL
Qty: 90 | Refills: 3 | Status: ACTIVE | COMMUNITY
Start: 2025-03-28 | End: 1900-01-01

## 2025-03-31 LAB
APPEARANCE: ABNORMAL
BACTERIA UR CULT: ABNORMAL
BACTERIA: ABNORMAL /HPF
BILIRUBIN URINE: ABNORMAL
BLOOD URINE: ABNORMAL
CAST: 0 /LPF
COLOR: ABNORMAL
EPITHELIAL CELLS: 0 /HPF
GLUCOSE QUALITATIVE U: NEGATIVE MG/DL
KETONES URINE: NEGATIVE MG/DL
LEUKOCYTE ESTERASE URINE: ABNORMAL
MICROSCOPIC-UA: NORMAL
NITRITE URINE: POSITIVE
PH URINE: 5.5
PROTEIN URINE: 100 MG/DL
RED BLOOD CELLS URINE: 185 /HPF
REVIEW: NORMAL
SPECIFIC GRAVITY URINE: 1.01
URINE CYTOLOGY: NORMAL
UROBILINOGEN URINE: 0.2 MG/DL
WHITE BLOOD CELLS URINE: 145 /HPF

## 2025-03-31 RX ORDER — CEPHALEXIN 500 MG/1
500 TABLET ORAL
Qty: 15 | Refills: 0 | Status: ACTIVE | COMMUNITY
Start: 2025-03-31 | End: 1900-01-01

## 2025-04-01 ENCOUNTER — APPOINTMENT (OUTPATIENT)
Dept: UROLOGY | Facility: CLINIC | Age: 80
End: 2025-04-01
Payer: MEDICARE

## 2025-04-01 ENCOUNTER — EMERGENCY (EMERGENCY)
Facility: HOSPITAL | Age: 80
LOS: 1 days | Discharge: HOME | End: 2025-04-01
Attending: EMERGENCY MEDICINE
Payer: COMMERCIAL

## 2025-04-01 VITALS
OXYGEN SATURATION: 95 % | RESPIRATION RATE: 18 BRPM | WEIGHT: 139.99 LBS | HEIGHT: 66 IN | TEMPERATURE: 98 F | DIASTOLIC BLOOD PRESSURE: 74 MMHG | HEART RATE: 96 BPM | SYSTOLIC BLOOD PRESSURE: 115 MMHG

## 2025-04-01 VITALS — SYSTOLIC BLOOD PRESSURE: 108 MMHG | HEART RATE: 92 BPM | DIASTOLIC BLOOD PRESSURE: 63 MMHG | OXYGEN SATURATION: 98 %

## 2025-04-01 VITALS
BODY MASS INDEX: 24.96 KG/M2 | TEMPERATURE: 98 F | WEIGHT: 150 LBS | HEART RATE: 98 BPM | RESPIRATION RATE: 18 BRPM | OXYGEN SATURATION: 96 % | DIASTOLIC BLOOD PRESSURE: 68 MMHG | SYSTOLIC BLOOD PRESSURE: 121 MMHG

## 2025-04-01 DIAGNOSIS — Z98.49 CATARACT EXTRACTION STATUS, UNSPECIFIED EYE: Chronic | ICD-10-CM

## 2025-04-01 DIAGNOSIS — N13.8 BENIGN PROSTATIC HYPERPLASIA WITH LOWER URINARY TRACT SYMPMS: ICD-10-CM

## 2025-04-01 DIAGNOSIS — N40.1 BENIGN PROSTATIC HYPERPLASIA WITH LOWER URINARY TRACT SYMPMS: ICD-10-CM

## 2025-04-01 DIAGNOSIS — Z98.890 OTHER SPECIFIED POSTPROCEDURAL STATES: Chronic | ICD-10-CM

## 2025-04-01 DIAGNOSIS — R31.0 GROSS HEMATURIA: ICD-10-CM

## 2025-04-01 DIAGNOSIS — Z92.89 PERSONAL HISTORY OF OTHER MEDICAL TREATMENT: Chronic | ICD-10-CM

## 2025-04-01 DIAGNOSIS — I48.91 UNSPECIFIED ATRIAL FIBRILLATION: ICD-10-CM

## 2025-04-01 LAB
ALBUMIN SERPL ELPH-MCNC: 4.4 G/DL — SIGNIFICANT CHANGE UP (ref 3.3–5)
ALP SERPL-CCNC: 80 U/L — SIGNIFICANT CHANGE UP (ref 40–120)
ALT FLD-CCNC: 70 U/L — HIGH (ref 10–45)
ANION GAP SERPL CALC-SCNC: 13 MMOL/L — SIGNIFICANT CHANGE UP (ref 5–17)
APPEARANCE UR: ABNORMAL
APTT BLD: 30.4 SEC — SIGNIFICANT CHANGE UP (ref 24.5–35.6)
AST SERPL-CCNC: 57 U/L — HIGH (ref 10–40)
BACTERIA # UR AUTO: NEGATIVE /HPF — SIGNIFICANT CHANGE UP
BASOPHILS # BLD AUTO: 0.03 K/UL — SIGNIFICANT CHANGE UP (ref 0–0.2)
BASOPHILS NFR BLD AUTO: 0.4 % — SIGNIFICANT CHANGE UP (ref 0–2)
BILIRUB UR-MCNC: ABNORMAL
BUN SERPL-MCNC: 14 MG/DL — SIGNIFICANT CHANGE UP (ref 7–23)
CALCIUM SERPL-MCNC: 8.8 MG/DL — SIGNIFICANT CHANGE UP (ref 8.4–10.5)
CAST: 0 /LPF — SIGNIFICANT CHANGE UP (ref 0–4)
CHLORIDE SERPL-SCNC: 106 MMOL/L — SIGNIFICANT CHANGE UP (ref 96–108)
CO2 SERPL-SCNC: 22 MMOL/L — SIGNIFICANT CHANGE UP (ref 22–31)
CREAT SERPL-MCNC: 0.84 MG/DL — SIGNIFICANT CHANGE UP (ref 0.5–1.3)
DIFF PNL FLD: ABNORMAL
EGFR: 89 ML/MIN/1.73M2 — SIGNIFICANT CHANGE UP
EGFR: 89 ML/MIN/1.73M2 — SIGNIFICANT CHANGE UP
EOSINOPHIL # BLD AUTO: 0.32 K/UL — SIGNIFICANT CHANGE UP (ref 0–0.5)
EOSINOPHIL NFR BLD AUTO: 4.6 % — SIGNIFICANT CHANGE UP (ref 0–6)
GLUCOSE SERPL-MCNC: 156 MG/DL — HIGH (ref 70–99)
GLUCOSE UR QL: NEGATIVE MG/DL — SIGNIFICANT CHANGE UP
HCT VFR BLD CALC: 48 % — SIGNIFICANT CHANGE UP (ref 39–50)
HCT VFR BLD CALC: 48.1 %
HGB BLD-MCNC: 15.6 G/DL
HGB BLD-MCNC: 15.8 G/DL — SIGNIFICANT CHANGE UP (ref 13–17)
IMM GRANULOCYTES NFR BLD AUTO: 0.6 % — SIGNIFICANT CHANGE UP (ref 0–0.9)
INR BLD: 0.97 RATIO — SIGNIFICANT CHANGE UP (ref 0.85–1.16)
KETONES UR-MCNC: NEGATIVE MG/DL — SIGNIFICANT CHANGE UP
LEUKOCYTE ESTERASE UR-ACNC: ABNORMAL
LYMPHOCYTES # BLD AUTO: 1.46 K/UL — SIGNIFICANT CHANGE UP (ref 1–3.3)
LYMPHOCYTES # BLD AUTO: 20.8 % — SIGNIFICANT CHANGE UP (ref 13–44)
MCHC RBC-ENTMCNC: 31.2 PG
MCHC RBC-ENTMCNC: 31.5 PG — SIGNIFICANT CHANGE UP (ref 27–34)
MCHC RBC-ENTMCNC: 32.4 G/DL
MCHC RBC-ENTMCNC: 32.9 G/DL — SIGNIFICANT CHANGE UP (ref 32–36)
MCV RBC AUTO: 95.6 FL — SIGNIFICANT CHANGE UP (ref 80–100)
MCV RBC AUTO: 96.2 FL
MONOCYTES # BLD AUTO: 0.54 K/UL — SIGNIFICANT CHANGE UP (ref 0–0.9)
MONOCYTES NFR BLD AUTO: 7.7 % — SIGNIFICANT CHANGE UP (ref 2–14)
NEUTROPHILS # BLD AUTO: 4.63 K/UL — SIGNIFICANT CHANGE UP (ref 1.8–7.4)
NEUTROPHILS NFR BLD AUTO: 65.9 % — SIGNIFICANT CHANGE UP (ref 43–77)
NITRITE UR-MCNC: NEGATIVE — SIGNIFICANT CHANGE UP
PH UR: 5.5 — SIGNIFICANT CHANGE UP (ref 5–8)
PLATELET # BLD AUTO: 191 K/UL — SIGNIFICANT CHANGE UP (ref 150–400)
PLATELET # BLD AUTO: 195 K/UL
POTASSIUM SERPL-MCNC: 4 MMOL/L — SIGNIFICANT CHANGE UP (ref 3.5–5.3)
POTASSIUM SERPL-SCNC: 4 MMOL/L — SIGNIFICANT CHANGE UP (ref 3.5–5.3)
PROT SERPL-MCNC: 8.1 G/DL — SIGNIFICANT CHANGE UP (ref 6–8.3)
PROT UR-MCNC: 100 MG/DL
PROTHROM AB SERPL-ACNC: 11.2 SEC — SIGNIFICANT CHANGE UP (ref 9.9–13.4)
RBC # BLD: 5 M/UL
RBC # BLD: 5.02 M/UL — SIGNIFICANT CHANGE UP (ref 4.2–5.8)
RBC # FLD: 13.2 % — SIGNIFICANT CHANGE UP (ref 10.3–14.5)
RBC # FLD: 13.5 %
RBC CASTS # UR COMP ASSIST: >1900 /HPF — HIGH (ref 0–4)
SODIUM SERPL-SCNC: 141 MMOL/L — SIGNIFICANT CHANGE UP (ref 135–145)
SP GR SPEC: 1.02 — SIGNIFICANT CHANGE UP (ref 1–1.03)
SQUAMOUS # UR AUTO: 2 /HPF — SIGNIFICANT CHANGE UP (ref 0–5)
UROBILINOGEN FLD QL: 0.2 MG/DL — SIGNIFICANT CHANGE UP (ref 0.2–1)
WBC # BLD: 7.02 K/UL — SIGNIFICANT CHANGE UP (ref 3.8–10.5)
WBC # FLD AUTO: 7.02 K/UL — SIGNIFICANT CHANGE UP (ref 3.8–10.5)
WBC # FLD AUTO: 7.66 K/UL
WBC UR QL: 20 /HPF — HIGH (ref 0–5)

## 2025-04-01 PROCEDURE — 85730 THROMBOPLASTIN TIME PARTIAL: CPT

## 2025-04-01 PROCEDURE — 99214 OFFICE O/P EST MOD 30 MIN: CPT | Mod: 25

## 2025-04-01 PROCEDURE — 81001 URINALYSIS AUTO W/SCOPE: CPT

## 2025-04-01 PROCEDURE — 76775 US EXAM ABDO BACK WALL LIM: CPT

## 2025-04-01 PROCEDURE — 87077 CULTURE AEROBIC IDENTIFY: CPT

## 2025-04-01 PROCEDURE — 87086 URINE CULTURE/COLONY COUNT: CPT

## 2025-04-01 PROCEDURE — 85610 PROTHROMBIN TIME: CPT

## 2025-04-01 PROCEDURE — 80053 COMPREHEN METABOLIC PANEL: CPT

## 2025-04-01 PROCEDURE — 99284 EMERGENCY DEPT VISIT MOD MDM: CPT | Mod: 25

## 2025-04-01 PROCEDURE — 74176 CT ABD & PELVIS W/O CONTRAST: CPT | Mod: 26

## 2025-04-01 PROCEDURE — 74176 CT ABD & PELVIS W/O CONTRAST: CPT | Mod: MC

## 2025-04-01 PROCEDURE — 36000 PLACE NEEDLE IN VEIN: CPT

## 2025-04-01 PROCEDURE — 99285 EMERGENCY DEPT VISIT HI MDM: CPT

## 2025-04-01 PROCEDURE — 99283 EMERGENCY DEPT VISIT LOW MDM: CPT

## 2025-04-01 PROCEDURE — 85025 COMPLETE CBC W/AUTO DIFF WBC: CPT

## 2025-04-01 NOTE — CONSULT NOTE ADULT - ASSESSMENT
79 year old male with hematuria x 1 week; urine color is currently clear yellow    -follow up urine culture  -outpatient CT urogram  -follow up with Dr Hoyos outpatient for further hematuria workup  -case d/w Dr Hoyos

## 2025-04-01 NOTE — CONSULT NOTE ADULT - NS PANP COMMENT GEN_ALL_CORE FT
Agree with assessment and plan.  Hct stable  Need outpatient followup for recurrent gross hematuria    Urologic intervention currently is not indicated    Matt Hoyos MD  [address]  [address]  [place], [place] [address] [phone]

## 2025-04-01 NOTE — ED PROVIDER NOTE - PATIENT PORTAL LINK FT
You can access the FollowMyHealth Patient Portal offered by [facility] by registering at the following website: [email or web address]. By joining [facility]’s FollowMyHealth portal, you will also be able to view your health information using other applications (apps) compatible with our system.

## 2025-04-01 NOTE — ED PROVIDER NOTE - ATTENDING CONTRIBUTION TO CARE
attending Zenobia: 79yM h/o BPH, HTN, DM, nephrolithiasis, asthma, p/w gross hematuria. Reports had same complaint last week, was seen by urologist outpatient, had ynu catheter placed at that time and irrigated bladder, was given abx. Here for recurrent symptoms. Not on AC. Sent in after discussion with urologist over the phone today. Exam as above. Will obtain labs, PVR,  UA/Ucx, urology consult in ED

## 2025-04-01 NOTE — ED PROVIDER NOTE - CLINICAL SUMMARY MEDICAL DECISION MAKING FREE TEXT BOX
This is a 79y old Male with BPH, HTN, DM, nephrolithiasis, asthma, who presents with hematuria and states he was sent in by his urologist for CBI. The patient has been having blood in his urine for 1 week and saw his urologist last week, who started him on antibiotics, placed a catheter and started him on CBI briefly in the office.  Pt states that he still had blood in his urine during this past week and came back into the office today and was directed to come to the ER for evaluation and to have CBI started.  He denies any flank pain, N/V, dizziness, dysuria, abdominal pain.      On physical exam patient appears well, not tachypneic, abdomen soft nontender.     Concern for possible need for CBI.  Plan for postvoid residual, UA UC.  Patient was Q doc CT abdomen pelvis was ordered to evaluate for nephrolithiasis.  Dispo pending pvr, lab results, ct, and urology.

## 2025-04-01 NOTE — ED PROVIDER NOTE - RAPID ASSESSMENT
79-year-old male history of BPH, HTN, type 2 diabetes, asthma presents with hematuria for 1 week that is worsening now with clots.  He saw urology today who recommended ER evaluation concern he may need bladder irrigation and concern for obstructive kidney stone or from prostate enlargement.  Patient reports minimal abdominal discomfort, denies nausea, vomiting, fever, chills.  Not on AC or AP.  Denies urogenital trauma.    Brief exam: well appearing Carlos LANDRY PA-C saw patient as a rapid assessment initially in triage. The rest of care to be performed by the primary ED team. Receiving team will follow up on labs, analgesia, any clinical imaging, and perform reassessment and disposition of the patient as clinically indicated. All decisions regarding the progression of care will be made at their discretion.

## 2025-04-01 NOTE — ED ADULT TRIAGE NOTE - BMI (KG/M2)
Desean HERNANDEZ.:  1968    Acct Number: [de-identified]   MRN:  [id number]                         [facility] NUTRITION WORKSHOP             Date: 3/3/2021        Session # 3    Todays class covered:    ()  Fueling a Healthy Body  ()  Label Reading  ()  Menu Selection  ()  Mindful Eating  (x) Targeting Nutrition Priorities    Readiness to change:    ( ) Pre-contemplative   ( ) Contemplative - ambivalent about change    (x) Action  ready to set action plan and implement   ( ) Maintenance  has made change and is trying, and or practicing different alternative behaviors     Notes:      Darin Saini was in the Workshop with the RN for 45minutes. The content was presented via Powerpoint, lecture, and patient participation based format. Motivational interviewing was utilized when needed, to promote change. Patient voiced understanding.     Electronically signed by Petty Rutledge RN on 3/3/2021 at 11:30 AM
22.6

## 2025-04-01 NOTE — CONSULT NOTE ADULT - SUBJECTIVE AND OBJECTIVE BOX
UROLOGY CONSULT NOTE    HPI:  This is a 79y old Male with BPH, HTN, DM, nephrolithiasis, asthma, who presents with hematuria and states he was sent in by his urologist for CBI.  History was obtained from the pt and his son at bedside.  The patient has been having blood in his urine for 1 week and saw his urologist last week, who started him on antibiotics, placed a catheter and started him on CBI briefly in the office.  Pt states that he still had blood in his urine during this past week and came back into the office today and was directed to come to the ER for evaluation and to have CBI started.  He denies any flank pain, N/V, dizziness, dysuria, abdominal pain.  His PVR in the ER - 29cc.  Urine color is currently clear yellow.      PAST MEDICAL HISTORY    Foot fracture, left    Elevated triglycerides with high cholesterol    Asthma    BPH (Benign Prostatic Hyperplasia)    Renal calculi    Fatty liver    TB (tuberculosis)    DALIA on CPAP    Other nonspecific abnormal finding of lung field    Impaired glucose tolerance    Malignant neoplasm of lower lobe of right lung    Asthma due to environmental allergies    Type 2 diabetes mellitus        PAST SURGICAL HISTORY    Foot fracture, left    Status post cataract extraction    H/O left inguinal hernia repair    History of prostate surgery    History of cardiac monitoring    H/O cardiac radiofrequency ablation        FAMILY HISTORY    No pertinent family history in first degree relatives        SOCIAL HISTORY    noncontributory      HOME MEDICATIONS    atorvastatin 10 mg oral tablet: 1 tab(s) orally once a day, pm (18 May 2022 08:44)  Breo Ellipta 100 mcg-25 mcg/inh inhalation powder: 1 puff(s) inhaled once a day (18 May 2022 08:44)  Calcium 600+D oral tablet: 1 tab(s) orally once a day (18 May 2022 08:44)  finasteride 5 mg oral tablet: 1 tab(s) orally once a day (18 May 2022 08:44)  fluticasone 50 mcg inhalation powder: 1 puff(s) inhaled 2 times a day (18 May 2022 08:44)  folic acid 1 mg oral tablet: 1 tab(s) orally once a day (18 May 2022 08:44)  loratadine 10 mg oral tablet: 1 tab(s) orally once a day, pm (18 May 2022 08:44)  metFORMIN 500 mg oral tablet, extended release: 1 tab(s) orally once a day (18 May 2022 08:44)  montelukast 10 mg oral tablet: 1 tab(s) orally once a day (18 May 2022 08:44)  multivitamin: 1 tab(s) orally once a day,  (18 May 2022 08:44)  Vascepa 1 g oral capsule: 1 cap(s) orally once a day (18 May 2022 08:44)  Ventolin HFA 90 mcg/inh inhalation aerosol: 2 puff(s) inhaled every 6 hours, As Needed (18 May 2022 08:44)  Vitamin B12 50 mcg oral tablet: 1 tab(s) orally once a day (18 May 2022 08:44)  Vitamin D 3: 1000 unit(s) orally once a day,  (18 May 2022 08:44)      DRUG ALLERGIES    No Known Drug Allergies      REVIEW OF SYSTEMS: Pertinent positives and negatives as stated in HPI, otherwise negative      VITAL SIGNS    T(F): 98.2, Max: 98.2 (25 @ 12:46)  HR: 96  BP: 115/74  RR: 18  SpO2: 95%    I's & O's        PHYSICAL EXAM    Gen: Well groomed, well dressed, well nourished  Abd: Soft, NT/ND  Back: no CVAT bilaterally  Ext: No edema present b/l      LABS:                        15.8   7.02  )-----------( 191               48.0     141  |  106  |  14  ----------------------------<  156  4.0   |  22  |  0.84    Ca    8.8    TPro  8.1  /  Alb  4.4  /  TBili  0.6  /  DBili  x   /  AST  57  /  ALT  70  /  AlkPhos  80    PT: 11.2 sec;   INR: 0.97 ratio  PTT:30.4 sec      Urinalysis Basic:    Color: Dark Yellow / Appearance: Cloudy / S.018 / pH: x  Gluc: x / Ketone: Negative mg/dL  / Bili: Small / Urobili: 0.2 mg/dL   Blood: x / Protein: 100 mg/dL / Nitrite: Negative   Leuk Esterase: Small / RBC: >1900 /HPF / WBC 20 /HPF   Sq Epi: x / Non Sq Epi: 2 /HPF / Bacteria: Negative /HPF      Urine culture: pending results      IMAGING:    CT:   Unchanged right upper pole 6 mm calculus.  Enlarged prostate.

## 2025-04-01 NOTE — ED PROVIDER NOTE - NSICDXPASTMEDICALHX_GEN_ALL_CORE_FT
PAST MEDICAL HISTORY:  Asthma due to environmental allergies mild; rarely uses rescue inhaler; On Breo-Ellipta daily    BPH (Benign Prostatic Hyperplasia) with LUTS s/p prostate laser greenlight procedure (2013)    Elevated triglycerides with high cholesterol on Vascepa    Fatty liver Monitored by PCP    Malignant neoplasm of lower lobe of right lung s/p Right lobectomy (2018) No chemo or radiation    DALIA on CPAP     Renal calculi passed stone in 2005; Pending Right Ureteroscopy, Laser Lithotripsy, Stone Extraction and Stent Placement with Dr. Hoyos on 5/18/22    TB (tuberculosis) Treated with meds x 1 year at age 20    Type 2 diabetes mellitus on metformin daily 500 mg XR     yes

## 2025-04-01 NOTE — ED PROVIDER NOTE - NSFOLLOWUPINSTRUCTIONS_ED_ALL_ED_FT
You have been diagnosed with hematuria, which means blood in your urine. These instructions will help you manage your condition and understand what to expect after leaving the hospital/doctor's office.    Please follow up the Urologist.     Medications:    You may have been prescribed medication(s). Take all medications as directed by your doctor. Do not stop taking any medication without first consulting your doctor.  If you were given antibiotics, finish the entire course even if you feel better.  Activity:    Depending on the cause of your hematuria and any other medical conditions you may have, your doctor may recommend activity restrictions. Follow these instructions carefully.  In general, avoid strenuous activities that may worsen your condition.  Get plenty of rest.  Diet:    Drink plenty of fluids, especially water, unless otherwise instructed by your doctor. This helps to flush out your urinary system.  Follow any specific dietary recommendations provided by your doctor.  Follow-up Care:    Schedule and keep your follow-up appointment with your doctor. This is essential to monitor your condition and ensure proper treatment.  Contact your doctor immediately if your symptoms worsen or if you experience any new symptoms.  What to Watch For:    Increased bleeding: If the amount of blood in your urine increases significantly, seek medical attention immediately.  Pain: If you experience new or worsening pain, particularly in your lower back, abdomen, or during urination, contact your doctor.  Signs of infection: Watch for fever, chills, increased urinary frequency, urgency, or burning during urination.  Other symptoms: Notify your doctor if you experience any other unusual symptoms, such as dizziness, fatigue, or swelling.  When to Seek Immediate Medical Attention:    Severe abdominal or flank pain.  Inability to urinate.  High fever.  Passing blood clots in your urine.  Dizziness or lightheadedness.

## 2025-04-01 NOTE — ED ADULT NURSE NOTE - OBJECTIVE STATEMENT
1410 pt 79ym ambulatory aox4 self care c/o hematuria x 1wk, able to verbalize concerns, son at bedside states dr padgett saw the pt but w/ hematuria still in progress referrred him to go to the hospital, pt states had same episode last year, same s/sx, dx enlarge prostate/or uti, pending eval

## 2025-04-02 LAB
CULTURE RESULTS: ABNORMAL
SPECIMEN SOURCE: SIGNIFICANT CHANGE UP

## 2025-04-04 ENCOUNTER — APPOINTMENT (OUTPATIENT)
Dept: UROLOGY | Facility: CLINIC | Age: 80
End: 2025-04-04
Payer: MEDICARE

## 2025-04-04 VITALS
SYSTOLIC BLOOD PRESSURE: 128 MMHG | BODY MASS INDEX: 24.96 KG/M2 | HEART RATE: 86 BPM | DIASTOLIC BLOOD PRESSURE: 79 MMHG | TEMPERATURE: 95 F | RESPIRATION RATE: 18 BRPM | WEIGHT: 150 LBS | OXYGEN SATURATION: 95 %

## 2025-04-04 DIAGNOSIS — R31.0 GROSS HEMATURIA: ICD-10-CM

## 2025-04-04 PROCEDURE — 52000 CYSTOURETHROSCOPY: CPT

## 2025-04-04 PROCEDURE — 99214 OFFICE O/P EST MOD 30 MIN: CPT | Mod: 25

## 2025-04-05 LAB
ANION GAP SERPL CALC-SCNC: 11 MMOL/L
APPEARANCE: CLEAR
BACTERIA: NEGATIVE /HPF
BILIRUBIN URINE: NEGATIVE
BLOOD URINE: ABNORMAL
BUN SERPL-MCNC: 14 MG/DL
CALCIUM SERPL-MCNC: 8.8 MG/DL
CAST: 0 /LPF
CHLORIDE SERPL-SCNC: 106 MMOL/L
CO2 SERPL-SCNC: 24 MMOL/L
COLOR: NORMAL
CREAT SERPL-MCNC: 0.79 MG/DL
EGFRCR SERPLBLD CKD-EPI 2021: 90 ML/MIN/1.73M2
EPITHELIAL CELLS: 0 /HPF
GLUCOSE QUALITATIVE U: NEGATIVE MG/DL
GLUCOSE SERPL-MCNC: 150 MG/DL
KETONES URINE: NEGATIVE MG/DL
LEUKOCYTE ESTERASE URINE: NEGATIVE
MICROSCOPIC-UA: NORMAL
NITRITE URINE: NEGATIVE
PH URINE: 6.5
POTASSIUM SERPL-SCNC: 3.9 MMOL/L
PROTEIN URINE: NORMAL MG/DL
PSA FREE FLD-MCNC: 35 %
PSA FREE SERPL-MCNC: 0.41 NG/ML
PSA SERPL-MCNC: 1.17 NG/ML
RED BLOOD CELLS URINE: 567 /HPF
SODIUM SERPL-SCNC: 141 MMOL/L
SPECIFIC GRAVITY URINE: 1.02
UROBILINOGEN URINE: 0.2 MG/DL
WHITE BLOOD CELLS URINE: 2 /HPF

## 2025-04-06 LAB — BACTERIA UR CULT: NORMAL

## 2025-04-07 LAB — URINE CYTOLOGY: NORMAL

## 2025-04-11 ENCOUNTER — APPOINTMENT (OUTPATIENT)
Dept: UROLOGY | Facility: CLINIC | Age: 80
End: 2025-04-11

## 2025-04-16 NOTE — ASU PATIENT PROFILE, ADULT - FALL HARM RISK - UNIVERSAL INTERVENTIONS
Alcoholic ketoacidosis Bed in lowest position, wheels locked, appropriate side rails in place/Call bell, personal items and telephone in reach/Instruct patient to call for assistance before getting out of bed or chair/Non-slip footwear when patient is out of bed/[place] to call system/Physically safe environment - no spills, clutter or unnecessary equipment/Purposeful Proactive Rounding/Room/bathroom lighting operational, light cord in reach

## (undated) DEVICE — WARMING BLANKET UPPER ADULT

## (undated) DEVICE — SOL IRR POUR H2O 1500ML

## (undated) DEVICE — POSITIONER FOAM EGG CRATE ULNAR 2PCS (PINK)

## (undated) DEVICE — POSITIONER FOAM HEADREST (PINK)

## (undated) DEVICE — FOLEY HOLDER STATLOCK 2 WAY ADULT

## (undated) DEVICE — VENODYNE/SCD SLEEVE CALF LARGE

## (undated) DEVICE — DRAPE EQUIPMENT COVER 27"

## (undated) DEVICE — GLV 7 PROTEXIS (WHITE)

## (undated) DEVICE — TUBING TUR 2 PRONG

## (undated) DEVICE — ADAPTER CHECK FLO 9FR STERILE

## (undated) DEVICE — ELCTR BOVIE PENCIL SMOKE EVACUATION

## (undated) DEVICE — SOL IRR POUR NS 0.9% 500ML

## (undated) DEVICE — DRAPE DRAINAGE BAG RELAX & GEMINI

## (undated) DEVICE — GLV 8 PROTEXIS (WHITE)

## (undated) DEVICE — SOL IRR BAG NS 0.9% 3000ML

## (undated) DEVICE — SOL IRR BAG H2O 3000ML

## (undated) DEVICE — SUT PDS II 1 48" TP-1

## (undated) DEVICE — ACMI SELF-SEALING SEAL UP TO 7FR

## (undated) DEVICE — GLV 7.5 PROTEXIS (WHITE)

## (undated) DEVICE — GOWN TRIMAX LG

## (undated) DEVICE — IRR BULB PATHFINDER + 10"

## (undated) DEVICE — GLV 6.5 PROTEXIS (WHITE)

## (undated) DEVICE — DRAPE EQUIPMENT BANDED BAG 30 X 30" (SHOWER CAP)

## (undated) DEVICE — PACK CYSTO